# Patient Record
Sex: FEMALE | Race: OTHER | HISPANIC OR LATINO
[De-identification: names, ages, dates, MRNs, and addresses within clinical notes are randomized per-mention and may not be internally consistent; named-entity substitution may affect disease eponyms.]

---

## 2018-11-15 ENCOUNTER — TRANSCRIPTION ENCOUNTER (OUTPATIENT)
Age: 83
End: 2018-11-15

## 2018-11-16 ENCOUNTER — INPATIENT (INPATIENT)
Facility: HOSPITAL | Age: 83
LOS: 4 days | Discharge: HOME | DRG: 335 | End: 2018-11-21
Attending: SURGERY | Admitting: SURGERY
Payer: COMMERCIAL

## 2018-11-16 VITALS
WEIGHT: 115.08 LBS | HEART RATE: 64 BPM | TEMPERATURE: 99 F | DIASTOLIC BLOOD PRESSURE: 103 MMHG | SYSTOLIC BLOOD PRESSURE: 187 MMHG | HEIGHT: 59 IN | OXYGEN SATURATION: 95 % | RESPIRATION RATE: 16 BRPM

## 2018-11-16 DIAGNOSIS — I16.0 HYPERTENSIVE URGENCY: ICD-10-CM

## 2018-11-16 DIAGNOSIS — Z29.9 ENCOUNTER FOR PROPHYLACTIC MEASURES, UNSPECIFIED: ICD-10-CM

## 2018-11-16 DIAGNOSIS — K56.609 UNSPECIFIED INTESTINAL OBSTRUCTION, UNSPECIFIED AS TO PARTIAL VERSUS COMPLETE OBSTRUCTION: ICD-10-CM

## 2018-11-16 DIAGNOSIS — R74.8 ABNORMAL LEVELS OF OTHER SERUM ENZYMES: ICD-10-CM

## 2018-11-16 DIAGNOSIS — M19.90 UNSPECIFIED OSTEOARTHRITIS, UNSPECIFIED SITE: ICD-10-CM

## 2018-11-16 DIAGNOSIS — I48.91 UNSPECIFIED ATRIAL FIBRILLATION: ICD-10-CM

## 2018-11-16 LAB
ALBUMIN SERPL ELPH-MCNC: 3.5 G/DL — SIGNIFICANT CHANGE UP (ref 3.5–5)
ALP SERPL-CCNC: 136 U/L — HIGH (ref 40–120)
ALT FLD-CCNC: 29 U/L DA — SIGNIFICANT CHANGE UP (ref 10–60)
ANION GAP SERPL CALC-SCNC: 10 MMOL/L — SIGNIFICANT CHANGE UP (ref 5–17)
ANION GAP SERPL CALC-SCNC: 11 MMOL/L — SIGNIFICANT CHANGE UP (ref 5–17)
ANION GAP SERPL CALC-SCNC: 11 MMOL/L — SIGNIFICANT CHANGE UP (ref 5–17)
APPEARANCE UR: CLEAR — SIGNIFICANT CHANGE UP
APTT BLD: 26.4 SEC — LOW (ref 27.5–36.3)
AST SERPL-CCNC: 55 U/L — HIGH (ref 10–40)
BASE EXCESS BLDA CALC-SCNC: -0.5 MMOL/L — SIGNIFICANT CHANGE UP (ref -2–2)
BASOPHILS # BLD AUTO: 0 K/UL — SIGNIFICANT CHANGE UP (ref 0–0.2)
BASOPHILS # BLD AUTO: 0.1 K/UL — SIGNIFICANT CHANGE UP (ref 0–0.2)
BASOPHILS NFR BLD AUTO: 0.6 % — SIGNIFICANT CHANGE UP (ref 0–2)
BASOPHILS NFR BLD AUTO: 1.2 % — SIGNIFICANT CHANGE UP (ref 0–2)
BILIRUB SERPL-MCNC: 1.3 MG/DL — HIGH (ref 0.2–1.2)
BILIRUB UR-MCNC: NEGATIVE — SIGNIFICANT CHANGE UP
BLOOD GAS COMMENTS ARTERIAL: SIGNIFICANT CHANGE UP
BUN SERPL-MCNC: 18 MG/DL — SIGNIFICANT CHANGE UP (ref 7–18)
BUN SERPL-MCNC: 19 MG/DL — HIGH (ref 7–18)
BUN SERPL-MCNC: 22 MG/DL — HIGH (ref 7–18)
CALCIUM SERPL-MCNC: 7.3 MG/DL — LOW (ref 8.4–10.5)
CALCIUM SERPL-MCNC: 8.8 MG/DL — SIGNIFICANT CHANGE UP (ref 8.4–10.5)
CALCIUM SERPL-MCNC: 8.9 MG/DL — SIGNIFICANT CHANGE UP (ref 8.4–10.5)
CHLORIDE SERPL-SCNC: 101 MMOL/L — SIGNIFICANT CHANGE UP (ref 96–108)
CHLORIDE SERPL-SCNC: 103 MMOL/L — SIGNIFICANT CHANGE UP (ref 96–108)
CHLORIDE SERPL-SCNC: 109 MMOL/L — HIGH (ref 96–108)
CK MB BLD-MCNC: 6.6 % — HIGH (ref 0–3.5)
CK MB CFR SERPL CALC: 6.7 NG/ML — HIGH (ref 0–3.6)
CK SERPL-CCNC: 102 U/L — SIGNIFICANT CHANGE UP (ref 21–215)
CO2 SERPL-SCNC: 23 MMOL/L — SIGNIFICANT CHANGE UP (ref 22–31)
CO2 SERPL-SCNC: 24 MMOL/L — SIGNIFICANT CHANGE UP (ref 22–31)
CO2 SERPL-SCNC: 25 MMOL/L — SIGNIFICANT CHANGE UP (ref 22–31)
COLOR SPEC: YELLOW — SIGNIFICANT CHANGE UP
CREAT SERPL-MCNC: 0.74 MG/DL — SIGNIFICANT CHANGE UP (ref 0.5–1.3)
CREAT SERPL-MCNC: 0.79 MG/DL — SIGNIFICANT CHANGE UP (ref 0.5–1.3)
CREAT SERPL-MCNC: 1.13 MG/DL — SIGNIFICANT CHANGE UP (ref 0.5–1.3)
DIFF PNL FLD: ABNORMAL
EOSINOPHIL # BLD AUTO: 0 K/UL — SIGNIFICANT CHANGE UP (ref 0–0.5)
EOSINOPHIL # BLD AUTO: 0 K/UL — SIGNIFICANT CHANGE UP (ref 0–0.5)
EOSINOPHIL NFR BLD AUTO: 0.1 % — SIGNIFICANT CHANGE UP (ref 0–6)
EOSINOPHIL NFR BLD AUTO: 0.2 % — SIGNIFICANT CHANGE UP (ref 0–6)
GLUCOSE SERPL-MCNC: 110 MG/DL — HIGH (ref 70–99)
GLUCOSE SERPL-MCNC: 147 MG/DL — HIGH (ref 70–99)
GLUCOSE SERPL-MCNC: 150 MG/DL — HIGH (ref 70–99)
GLUCOSE UR QL: NEGATIVE — SIGNIFICANT CHANGE UP
HCO3 BLDA-SCNC: 22 MMOL/L — LOW (ref 23–27)
HCT VFR BLD CALC: 36.5 % — SIGNIFICANT CHANGE UP (ref 34.5–45)
HCT VFR BLD CALC: 41.6 % — SIGNIFICANT CHANGE UP (ref 34.5–45)
HCT VFR BLD CALC: 44 % — SIGNIFICANT CHANGE UP (ref 34.5–45)
HGB BLD-MCNC: 11.8 G/DL — SIGNIFICANT CHANGE UP (ref 11.5–15.5)
HGB BLD-MCNC: 13.4 G/DL — SIGNIFICANT CHANGE UP (ref 11.5–15.5)
HGB BLD-MCNC: 14.3 G/DL — SIGNIFICANT CHANGE UP (ref 11.5–15.5)
HOROWITZ INDEX BLDA+IHG-RTO: 50 — SIGNIFICANT CHANGE UP
KETONES UR-MCNC: ABNORMAL
LACTATE SERPL-SCNC: 1.8 MMOL/L — SIGNIFICANT CHANGE UP (ref 0.7–2)
LACTATE SERPL-SCNC: 2.1 MMOL/L — HIGH (ref 0.7–2)
LEUKOCYTE ESTERASE UR-ACNC: ABNORMAL
LIDOCAIN IGE QN: 118 U/L — SIGNIFICANT CHANGE UP (ref 73–393)
LYMPHOCYTES # BLD AUTO: 0.3 K/UL — LOW (ref 1–3.3)
LYMPHOCYTES # BLD AUTO: 0.5 K/UL — LOW (ref 1–3.3)
LYMPHOCYTES # BLD AUTO: 5 % — LOW (ref 13–44)
LYMPHOCYTES # BLD AUTO: 6.4 % — LOW (ref 13–44)
MAGNESIUM SERPL-MCNC: 1.5 MG/DL — LOW (ref 1.6–2.6)
MCHC RBC-ENTMCNC: 30.8 PG — SIGNIFICANT CHANGE UP (ref 27–34)
MCHC RBC-ENTMCNC: 31 PG — SIGNIFICANT CHANGE UP (ref 27–34)
MCHC RBC-ENTMCNC: 31.2 PG — SIGNIFICANT CHANGE UP (ref 27–34)
MCHC RBC-ENTMCNC: 32.2 GM/DL — SIGNIFICANT CHANGE UP (ref 32–36)
MCHC RBC-ENTMCNC: 32.5 GM/DL — SIGNIFICANT CHANGE UP (ref 32–36)
MCHC RBC-ENTMCNC: 32.5 GM/DL — SIGNIFICANT CHANGE UP (ref 32–36)
MCV RBC AUTO: 94.9 FL — SIGNIFICANT CHANGE UP (ref 80–100)
MCV RBC AUTO: 96 FL — SIGNIFICANT CHANGE UP (ref 80–100)
MCV RBC AUTO: 96.2 FL — SIGNIFICANT CHANGE UP (ref 80–100)
MONOCYTES # BLD AUTO: 0.4 K/UL — SIGNIFICANT CHANGE UP (ref 0–0.9)
MONOCYTES # BLD AUTO: 0.5 K/UL — SIGNIFICANT CHANGE UP (ref 0–0.9)
MONOCYTES NFR BLD AUTO: 5.4 % — SIGNIFICANT CHANGE UP (ref 2–14)
MONOCYTES NFR BLD AUTO: 9.5 % — SIGNIFICANT CHANGE UP (ref 2–14)
NEUTROPHILS # BLD AUTO: 4.4 K/UL — SIGNIFICANT CHANGE UP (ref 1.8–7.4)
NEUTROPHILS # BLD AUTO: 7 K/UL — SIGNIFICANT CHANGE UP (ref 1.8–7.4)
NEUTROPHILS NFR BLD AUTO: 84.7 % — HIGH (ref 43–77)
NEUTROPHILS NFR BLD AUTO: 86.9 % — HIGH (ref 43–77)
NITRITE UR-MCNC: NEGATIVE — SIGNIFICANT CHANGE UP
NT-PROBNP SERPL-SCNC: HIGH PG/ML (ref 0–450)
PCO2 BLDA: 29 MMHG — LOW (ref 32–46)
PH BLDA: 7.49 — HIGH (ref 7.35–7.45)
PH UR: 6.5 — SIGNIFICANT CHANGE UP (ref 5–8)
PHOSPHATE SERPL-MCNC: 2.8 MG/DL — SIGNIFICANT CHANGE UP (ref 2.5–4.5)
PLATELET # BLD AUTO: 174 K/UL — SIGNIFICANT CHANGE UP (ref 150–400)
PLATELET # BLD AUTO: 239 K/UL — SIGNIFICANT CHANGE UP (ref 150–400)
PLATELET # BLD AUTO: 240 K/UL — SIGNIFICANT CHANGE UP (ref 150–400)
PO2 BLDA: 123 MMHG — HIGH (ref 74–108)
POTASSIUM SERPL-MCNC: 3.3 MMOL/L — LOW (ref 3.5–5.3)
POTASSIUM SERPL-MCNC: 3.3 MMOL/L — LOW (ref 3.5–5.3)
POTASSIUM SERPL-MCNC: 3.8 MMOL/L — SIGNIFICANT CHANGE UP (ref 3.5–5.3)
POTASSIUM SERPL-SCNC: 3.3 MMOL/L — LOW (ref 3.5–5.3)
POTASSIUM SERPL-SCNC: 3.3 MMOL/L — LOW (ref 3.5–5.3)
POTASSIUM SERPL-SCNC: 3.8 MMOL/L — SIGNIFICANT CHANGE UP (ref 3.5–5.3)
PROT SERPL-MCNC: 8.1 G/DL — SIGNIFICANT CHANGE UP (ref 6–8.3)
PROT UR-MCNC: 100
RBC # BLD: 3.84 M/UL — SIGNIFICANT CHANGE UP (ref 3.8–5.2)
RBC # BLD: 4.32 M/UL — SIGNIFICANT CHANGE UP (ref 3.8–5.2)
RBC # BLD: 4.58 M/UL — SIGNIFICANT CHANGE UP (ref 3.8–5.2)
RBC # FLD: 13.6 % — SIGNIFICANT CHANGE UP (ref 10.3–14.5)
RBC # FLD: 13.7 % — SIGNIFICANT CHANGE UP (ref 10.3–14.5)
RBC # FLD: 13.8 % — SIGNIFICANT CHANGE UP (ref 10.3–14.5)
SAO2 % BLDA: 99 % — HIGH (ref 92–96)
SODIUM SERPL-SCNC: 137 MMOL/L — SIGNIFICANT CHANGE UP (ref 135–145)
SODIUM SERPL-SCNC: 137 MMOL/L — SIGNIFICANT CHANGE UP (ref 135–145)
SODIUM SERPL-SCNC: 143 MMOL/L — SIGNIFICANT CHANGE UP (ref 135–145)
SP GR SPEC: 1.01 — SIGNIFICANT CHANGE UP (ref 1.01–1.02)
TROPONIN I SERPL-MCNC: 0.06 NG/ML — HIGH (ref 0–0.04)
TROPONIN I SERPL-MCNC: 0.14 NG/ML — HIGH (ref 0–0.04)
UROBILINOGEN FLD QL: NEGATIVE — SIGNIFICANT CHANGE UP
WBC # BLD: 13 K/UL — HIGH (ref 3.8–10.5)
WBC # BLD: 5.2 K/UL — SIGNIFICANT CHANGE UP (ref 3.8–10.5)
WBC # BLD: 8 K/UL — SIGNIFICANT CHANGE UP (ref 3.8–10.5)
WBC # FLD AUTO: 13 K/UL — HIGH (ref 3.8–10.5)
WBC # FLD AUTO: 5.2 K/UL — SIGNIFICANT CHANGE UP (ref 3.8–10.5)
WBC # FLD AUTO: 8 K/UL — SIGNIFICANT CHANGE UP (ref 3.8–10.5)

## 2018-11-16 PROCEDURE — 74177 CT ABD & PELVIS W/CONTRAST: CPT | Mod: 26

## 2018-11-16 PROCEDURE — 71045 X-RAY EXAM CHEST 1 VIEW: CPT | Mod: 26,76

## 2018-11-16 PROCEDURE — 71045 X-RAY EXAM CHEST 1 VIEW: CPT | Mod: 26,77

## 2018-11-16 PROCEDURE — 99285 EMERGENCY DEPT VISIT HI MDM: CPT

## 2018-11-16 PROCEDURE — 93970 EXTREMITY STUDY: CPT | Mod: 26

## 2018-11-16 RX ORDER — SODIUM CHLORIDE 9 MG/ML
1000 INJECTION, SOLUTION INTRAVENOUS ONCE
Qty: 0 | Refills: 0 | Status: COMPLETED | OUTPATIENT
Start: 2018-11-16 | End: 2018-11-16

## 2018-11-16 RX ORDER — SODIUM CHLORIDE 9 MG/ML
1000 INJECTION INTRAMUSCULAR; INTRAVENOUS; SUBCUTANEOUS ONCE
Qty: 0 | Refills: 0 | Status: COMPLETED | OUTPATIENT
Start: 2018-11-16 | End: 2018-11-16

## 2018-11-16 RX ORDER — METOPROLOL TARTRATE 50 MG
5 TABLET ORAL EVERY 6 HOURS
Qty: 0 | Refills: 0 | Status: DISCONTINUED | OUTPATIENT
Start: 2018-11-16 | End: 2018-11-16

## 2018-11-16 RX ORDER — MORPHINE SULFATE 50 MG/1
2 CAPSULE, EXTENDED RELEASE ORAL ONCE
Qty: 0 | Refills: 0 | Status: DISCONTINUED | OUTPATIENT
Start: 2018-11-16 | End: 2018-11-16

## 2018-11-16 RX ORDER — ONDANSETRON 8 MG/1
4 TABLET, FILM COATED ORAL ONCE
Qty: 0 | Refills: 0 | Status: COMPLETED | OUTPATIENT
Start: 2018-11-16 | End: 2018-11-16

## 2018-11-16 RX ORDER — MORPHINE SULFATE 50 MG/1
4 CAPSULE, EXTENDED RELEASE ORAL ONCE
Qty: 0 | Refills: 0 | Status: DISCONTINUED | OUTPATIENT
Start: 2018-11-16 | End: 2018-11-16

## 2018-11-16 RX ORDER — ONDANSETRON 8 MG/1
4 TABLET, FILM COATED ORAL EVERY 6 HOURS
Qty: 0 | Refills: 0 | Status: DISCONTINUED | OUTPATIENT
Start: 2018-11-16 | End: 2018-11-16

## 2018-11-16 RX ORDER — PIPERACILLIN AND TAZOBACTAM 4; .5 G/20ML; G/20ML
3.38 INJECTION, POWDER, LYOPHILIZED, FOR SOLUTION INTRAVENOUS EVERY 8 HOURS
Qty: 0 | Refills: 0 | Status: DISCONTINUED | OUTPATIENT
Start: 2018-11-16 | End: 2018-11-16

## 2018-11-16 RX ORDER — MORPHINE SULFATE 50 MG/1
2 CAPSULE, EXTENDED RELEASE ORAL EVERY 4 HOURS
Qty: 0 | Refills: 0 | Status: DISCONTINUED | OUTPATIENT
Start: 2018-11-16 | End: 2018-11-16

## 2018-11-16 RX ORDER — HEPARIN SODIUM 5000 [USP'U]/ML
5000 INJECTION INTRAVENOUS; SUBCUTANEOUS EVERY 8 HOURS
Qty: 0 | Refills: 0 | Status: DISCONTINUED | OUTPATIENT
Start: 2018-11-16 | End: 2018-11-20

## 2018-11-16 RX ORDER — PIPERACILLIN AND TAZOBACTAM 4; .5 G/20ML; G/20ML
3.38 INJECTION, POWDER, LYOPHILIZED, FOR SOLUTION INTRAVENOUS ONCE
Qty: 0 | Refills: 0 | Status: COMPLETED | OUTPATIENT
Start: 2018-11-16 | End: 2018-11-16

## 2018-11-16 RX ORDER — METOPROLOL TARTRATE 50 MG
2.5 TABLET ORAL EVERY 6 HOURS
Qty: 0 | Refills: 0 | Status: DISCONTINUED | OUTPATIENT
Start: 2018-11-16 | End: 2018-11-16

## 2018-11-16 RX ORDER — NITROGLYCERIN 6.5 MG
0.4 CAPSULE, EXTENDED RELEASE ORAL ONCE
Qty: 0 | Refills: 0 | Status: COMPLETED | OUTPATIENT
Start: 2018-11-16 | End: 2018-11-16

## 2018-11-16 RX ORDER — MAGNESIUM SULFATE 500 MG/ML
2 VIAL (ML) INJECTION ONCE
Qty: 0 | Refills: 0 | Status: COMPLETED | OUTPATIENT
Start: 2018-11-16 | End: 2018-11-16

## 2018-11-16 RX ORDER — POTASSIUM CHLORIDE 20 MEQ
10 PACKET (EA) ORAL
Qty: 0 | Refills: 0 | Status: COMPLETED | OUTPATIENT
Start: 2018-11-16 | End: 2018-11-16

## 2018-11-16 RX ORDER — CHLORHEXIDINE GLUCONATE 213 G/1000ML
1 SOLUTION TOPICAL
Qty: 0 | Refills: 0 | Status: DISCONTINUED | OUTPATIENT
Start: 2018-11-16 | End: 2018-11-21

## 2018-11-16 RX ORDER — FAMOTIDINE 10 MG/ML
20 INJECTION INTRAVENOUS ONCE
Qty: 0 | Refills: 0 | Status: COMPLETED | OUTPATIENT
Start: 2018-11-16 | End: 2018-11-16

## 2018-11-16 RX ORDER — FENTANYL CITRATE 50 UG/ML
0.5 INJECTION INTRAVENOUS
Qty: 2500 | Refills: 0 | Status: DISCONTINUED | OUTPATIENT
Start: 2018-11-16 | End: 2018-11-17

## 2018-11-16 RX ORDER — LABETALOL HCL 100 MG
10 TABLET ORAL EVERY 6 HOURS
Qty: 0 | Refills: 0 | Status: DISCONTINUED | OUTPATIENT
Start: 2018-11-16 | End: 2018-11-16

## 2018-11-16 RX ORDER — DEXTROSE MONOHYDRATE, SODIUM CHLORIDE, AND POTASSIUM CHLORIDE 50; .745; 4.5 G/1000ML; G/1000ML; G/1000ML
1000 INJECTION, SOLUTION INTRAVENOUS
Qty: 0 | Refills: 0 | Status: DISCONTINUED | OUTPATIENT
Start: 2018-11-16 | End: 2018-11-16

## 2018-11-16 RX ORDER — HYDRALAZINE HCL 50 MG
5 TABLET ORAL EVERY 6 HOURS
Qty: 0 | Refills: 0 | Status: DISCONTINUED | OUTPATIENT
Start: 2018-11-16 | End: 2018-11-16

## 2018-11-16 RX ORDER — PIPERACILLIN AND TAZOBACTAM 4; .5 G/20ML; G/20ML
3.38 INJECTION, POWDER, LYOPHILIZED, FOR SOLUTION INTRAVENOUS EVERY 8 HOURS
Qty: 0 | Refills: 0 | Status: DISCONTINUED | OUTPATIENT
Start: 2018-11-16 | End: 2018-11-21

## 2018-11-16 RX ORDER — ACETAMINOPHEN 500 MG
1000 TABLET ORAL ONCE
Qty: 0 | Refills: 0 | Status: COMPLETED | OUTPATIENT
Start: 2018-11-16 | End: 2018-11-16

## 2018-11-16 RX ADMIN — FAMOTIDINE 20 MILLIGRAM(S): 10 INJECTION INTRAVENOUS at 02:27

## 2018-11-16 RX ADMIN — Medication 100 MILLIEQUIVALENT(S): at 20:09

## 2018-11-16 RX ADMIN — Medication 400 MILLIGRAM(S): at 12:08

## 2018-11-16 RX ADMIN — MORPHINE SULFATE 4 MILLIGRAM(S): 50 CAPSULE, EXTENDED RELEASE ORAL at 03:55

## 2018-11-16 RX ADMIN — Medication 0.4 MILLIGRAM(S): at 03:43

## 2018-11-16 RX ADMIN — Medication 5 MILLIGRAM(S): at 11:38

## 2018-11-16 RX ADMIN — Medication 50 GRAM(S): at 20:31

## 2018-11-16 RX ADMIN — PIPERACILLIN AND TAZOBACTAM 25 GRAM(S): 4; .5 INJECTION, POWDER, LYOPHILIZED, FOR SOLUTION INTRAVENOUS at 22:24

## 2018-11-16 RX ADMIN — HEPARIN SODIUM 5000 UNIT(S): 5000 INJECTION INTRAVENOUS; SUBCUTANEOUS at 22:24

## 2018-11-16 RX ADMIN — SODIUM CHLORIDE 4000 MILLILITER(S): 9 INJECTION, SOLUTION INTRAVENOUS at 18:50

## 2018-11-16 RX ADMIN — MORPHINE SULFATE 4 MILLIGRAM(S): 50 CAPSULE, EXTENDED RELEASE ORAL at 03:43

## 2018-11-16 RX ADMIN — PIPERACILLIN AND TAZOBACTAM 25 GRAM(S): 4; .5 INJECTION, POWDER, LYOPHILIZED, FOR SOLUTION INTRAVENOUS at 14:34

## 2018-11-16 RX ADMIN — Medication 100 MILLIEQUIVALENT(S): at 21:29

## 2018-11-16 RX ADMIN — SODIUM CHLORIDE 1000 MILLILITER(S): 9 INJECTION INTRAMUSCULAR; INTRAVENOUS; SUBCUTANEOUS at 06:26

## 2018-11-16 RX ADMIN — Medication 100 MILLIEQUIVALENT(S): at 22:24

## 2018-11-16 RX ADMIN — Medication 1000 MILLIGRAM(S): at 12:50

## 2018-11-16 RX ADMIN — ONDANSETRON 4 MILLIGRAM(S): 8 TABLET, FILM COATED ORAL at 12:32

## 2018-11-16 RX ADMIN — SODIUM CHLORIDE 1000 MILLILITER(S): 9 INJECTION INTRAMUSCULAR; INTRAVENOUS; SUBCUTANEOUS at 02:29

## 2018-11-16 RX ADMIN — MORPHINE SULFATE 4 MILLIGRAM(S): 50 CAPSULE, EXTENDED RELEASE ORAL at 02:27

## 2018-11-16 RX ADMIN — FENTANYL CITRATE 2.92 MICROGRAM(S)/KG/HR: 50 INJECTION INTRAVENOUS at 21:30

## 2018-11-16 RX ADMIN — PIPERACILLIN AND TAZOBACTAM 200 GRAM(S): 4; .5 INJECTION, POWDER, LYOPHILIZED, FOR SOLUTION INTRAVENOUS at 06:33

## 2018-11-16 RX ADMIN — ONDANSETRON 4 MILLIGRAM(S): 8 TABLET, FILM COATED ORAL at 02:34

## 2018-11-16 NOTE — CONSULT NOTE ADULT - PROBLEM SELECTOR RECOMMENDATION 2
patient presents with persistently elevated bp and no ability to give PO meds  -questionable whether it is HTN emergency given that patient is making troponins  -will put patient on IV hydralazine 5mg q6h and monitor bp  -hold for SBP < 140 Initial issue on presentation to the ED but likely 2/2 to poor pain control  - Begun on PRN Metoprolol and Hydralazine  - S/p OR hypotensive MAPs 50s, given 1 L LR bolus

## 2018-11-16 NOTE — CONSULT NOTE ADULT - PROBLEM SELECTOR RECOMMENDATION 2
patient presents with persistently elevated bp and no ability to give PO meds  -questionable whether it is HTN emergency given that patient is making troponins  -will put patient on IV hydralazine 5mg q6h and monitor bp  -hold for SBP < 140

## 2018-11-16 NOTE — CONSULT NOTE ADULT - SUBJECTIVE AND OBJECTIVE BOX
89y  Female  No Known Allergies    Patient is a 89y old  Female who presents with a chief complaint of sbo (16 Nov 2018 14:17)    HPI:  89 y/o female with h/o c-sections, open solange, poss hysterectomy(son unsure)  PMH-HTN  c/o abd pain, n/v since last night with PO intake  last bm/flatus Wed  no f/c    < from: CT Abdomen and Pelvis w/ IV Cont (11.16.18 @ 04:07) >  There is a small hiatus hernia. There are numerous dilated fluid-filled   small bowel loops predominantly in the pelvis with multiple converging   transition points in the right lower quadrant most compatible with a   closed loop small bowel obstruction. There is resultant upstream   dilatation of proximal small bowel loops. There is mild thickening of a   segment of the dilated small bowel in the right lower quadrant with   mesenteric edema and small volume ascites which raises concern for bowel   ischemia. A normal appendix is identified. There is colonic   diverticulosis without evidence of diverticulitis. There is no   pneumoperitoneum. 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    < end of copied text > (16 Nov 2018 06:14)    PAST MEDICAL & SURGICAL HISTORY:  Arthritis  HTN (hypertension)    FAMILY HISTORY:    MEDICATIONS  (STANDING):  dextrose 5% + sodium chloride 0.45% with potassium chloride 20 mEq/L 1000 milliLiter(s) (100 mL/Hr) IV Continuous <Continuous>  heparin  Injectable 5000 Unit(s) SubCutaneous every 8 hours  hydrALAZINE Injectable 5 milliGRAM(s) IV Push every 6 hours  lactated ringers Bolus 1000 milliLiter(s) IV Bolus once  piperacillin/tazobactam IVPB. 3.375 Gram(s) IV Intermittent every 8 hours    MEDICATIONS  (PRN):  metoprolol tartrate Injectable 5 milliGRAM(s) IV Push every 6 hours PRN for HR > 110  morphine  - Injectable 2 milliGRAM(s) IV Push once PRN Severe Pain (7 - 10)  ondansetron Injectable 4 milliGRAM(s) IV Push every 6 hours PRN Nausea and/or Vomiting      Vital Signs Last 24 Hrs  T(C): 35.3 (16 Nov 2018 18:38), Max: 37.3 (16 Nov 2018 01:48)  T(F): 95.5 (16 Nov 2018 18:38), Max: 99.1 (16 Nov 2018 01:48)  HR: 56 (16 Nov 2018 18:45) (56 - 75)  BP: 125/61 (16 Nov 2018 18:45) (82/37 - 188/91)  BP(mean): 76 (16 Nov 2018 18:45) (46 - 76)  RR: 22 (16 Nov 2018 18:45) (15 - 22)  SpO2: 98% (16 Nov 2018 18:45) (95% - 100%)  CBC Full  -  ( 16 Nov 2018 12:10 )  WBC Count : 13.0 K/uL  Hemoglobin : 14.3 g/dL  Hematocrit : 44.0 %  Platelet Count - Automated : 239 K/uL  Mean Cell Volume : 96.0 fl  Mean Cell Hemoglobin : 31.2 pg  Mean Cell Hemoglobin Concentration : 32.5 gm/dL  Auto Neutrophil # : x  Auto Lymphocyte # : x  Auto Monocyte # : x  Auto Eosinophil # : x  Auto Basophil # : x  Auto Neutrophil % : x  Auto Lymphocyte % : x  Auto Monocyte % : x  Auto Eosinophil % : x  Auto Basophil % : x    PTT - ( 16 Nov 2018 02:56 )  PTT:26.4 sec  11-16    137  |  103  |  18  ----------------------------<  150<H>  3.3<L>   |  24  |  0.79    Ca    8.8      16 Nov 2018 12:10    TPro  8.1  /  Alb  3.5  /  TBili  1.3<H>  /  DBili  x   /  AST  55<H>  /  ALT  29  /  AlkPhos  136<H>  11-16      REVIEW OF SYSTEMS      General:	    Skin/Breast:  	  Ophthalmologic:  	  ENMT:	    Respiratory and Thorax:  	  Cardiovascular:	    Gastrointestinal:	    Genitourinary:	    Musculoskeletal:	    Neurological:	    Psychiatric:	    Hematology/Lymphatics:	    Endocrine:	    Allergic/Immunologic:	    Physicial Exam:     Constitutional: NAD, well-groomed, well-developed  HEENT: PERRLA, EOMI, no drainage or redness  Neck: No bruits; no thyromegaly or nodules,  No JVD  Back: Normal spine flexure, No CVA tenderness, No deformity or limitation of movement  Respiratory: Breath Sounds equal & clear to percussion & auscultation, no accessory muscle use  Cardiovascular: Regular rate & rhythm, normal S1, S2; no murmurs, gallops or rubs; no S3, S4  Gastrointestinal: Soft, non-tender, non distended no hepatosplenomegaly, normal bowel sounds  Extremities: No peripheral edema, No cyanosis, clubbing   Vascular: Equal and normal pulses: 2+ peripheral pulses throughout  Neurological: GCS:    A&O x 3; no sensory, motor  deficits, normal reflexes  Psychiatric: Normal mood, normal affect  Musculoskeletal: No joint pain, swelling or deformity; no limitation of movement  Skin: No rashes HPI:  89 F pt with PMHx HTN presents to ED c/o constant burning abdominal pain - unable to hold down prunes or soup that her son tried to give. Son gave pt 2 Advil tablets and anti-acids with no relief. Pt was c/o difficulty breathing earlier in the day but symptoms have resolved since then. Patient denies diarrhea, fever, chest pain or any other acute complaints at this time.      89 y/o female with h/o c-sections, open solange, poss hysterectomy(son unsure)  PMH-HTN  c/o abd pain, n/v since last night with PO intake  last bm/flatus Wed  no f/c    < from: CT Abdomen and Pelvis w/ IV Cont (11.16.18 @ 04:07) >  There is a small hiatus hernia. There are numerous dilated fluid-filled   small bowel loops predominantly in the pelvis with multiple converging   transition points in the right lower quadrant most compatible with a   closed loop small bowel obstruction. There is resultant upstream   dilatation of proximal small bowel loops. There is mild thickening of a   segment of the dilated small bowel in the right lower quadrant with   mesenteric edema and small volume ascites which raises concern for bowel   ischemia. A normal appendix is identified. There is colonic   diverticulosis without evidence of diverticulitis. There is no   pneumoperitoneum. 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    < end of copied text > (16 Nov 2018 06:14)    PAST MEDICAL & SURGICAL HISTORY:  Arthritis  HTN (hypertension)    FAMILY HISTORY:    MEDICATIONS  (STANDING):  dextrose 5% + sodium chloride 0.45% with potassium chloride 20 mEq/L 1000 milliLiter(s) (100 mL/Hr) IV Continuous <Continuous>  heparin  Injectable 5000 Unit(s) SubCutaneous every 8 hours  hydrALAZINE Injectable 5 milliGRAM(s) IV Push every 6 hours  lactated ringers Bolus 1000 milliLiter(s) IV Bolus once  piperacillin/tazobactam IVPB. 3.375 Gram(s) IV Intermittent every 8 hours    MEDICATIONS  (PRN):  metoprolol tartrate Injectable 5 milliGRAM(s) IV Push every 6 hours PRN for HR > 110  morphine  - Injectable 2 milliGRAM(s) IV Push once PRN Severe Pain (7 - 10)  ondansetron Injectable 4 milliGRAM(s) IV Push every 6 hours PRN Nausea and/or Vomiting      Vital Signs Last 24 Hrs  T(C): 35.3 (16 Nov 2018 18:38), Max: 37.3 (16 Nov 2018 01:48)  T(F): 95.5 (16 Nov 2018 18:38), Max: 99.1 (16 Nov 2018 01:48)  HR: 56 (16 Nov 2018 18:45) (56 - 75)  BP: 125/61 (16 Nov 2018 18:45) (82/37 - 188/91)  BP(mean): 76 (16 Nov 2018 18:45) (46 - 76)  RR: 22 (16 Nov 2018 18:45) (15 - 22)  SpO2: 98% (16 Nov 2018 18:45) (95% - 100%)  CBC Full  -  ( 16 Nov 2018 12:10 )  WBC Count : 13.0 K/uL  Hemoglobin : 14.3 g/dL  Hematocrit : 44.0 %  Platelet Count - Automated : 239 K/uL  Mean Cell Volume : 96.0 fl  Mean Cell Hemoglobin : 31.2 pg  Mean Cell Hemoglobin Concentration : 32.5 gm/dL  Auto Neutrophil # : x  Auto Lymphocyte # : x  Auto Monocyte # : x  Auto Eosinophil # : x  Auto Basophil # : x  Auto Neutrophil % : x  Auto Lymphocyte % : x  Auto Monocyte % : x  Auto Eosinophil % : x  Auto Basophil % : x    PTT - ( 16 Nov 2018 02:56 )  PTT:26.4 sec  11-16    137  |  103  |  18  ----------------------------<  150<H>  3.3<L>   |  24  |  0.79    Ca    8.8      16 Nov 2018 12:10    TPro  8.1  /  Alb  3.5  /  TBili  1.3<H>  /  DBili  x   /  AST  55<H>  /  ALT  29  /  AlkPhos  136<H>  11-16      REVIEW OF SYSTEMS      General:	    Skin/Breast:  	  Ophthalmologic:  	  ENMT:	    Respiratory and Thorax:  	  Cardiovascular:	    Gastrointestinal:	    Genitourinary:	    Musculoskeletal:	    Neurological:	    Psychiatric:	    Hematology/Lymphatics:	    Endocrine:	    Allergic/Immunologic:	    Physicial Exam:     Constitutional: NAD, well-groomed, well-developed  HEENT: PERRLA, EOMI, no drainage or redness  Neck: No bruits; no thyromegaly or nodules,  No JVD  Back: Normal spine flexure, No CVA tenderness, No deformity or limitation of movement  Respiratory: Breath Sounds equal & clear to percussion & auscultation, no accessory muscle use  Cardiovascular: Regular rate & rhythm, normal S1, S2; no murmurs, gallops or rubs; no S3, S4  Gastrointestinal: Soft, non-tender, non distended no hepatosplenomegaly, normal bowel sounds  Extremities: No peripheral edema, No cyanosis, clubbing   Vascular: Equal and normal pulses: 2+ peripheral pulses throughout  Neurological: GCS:    A&O x 3; no sensory, motor  deficits, normal reflexes  Psychiatric: Normal mood, normal affect  Musculoskeletal: No joint pain, swelling or deformity; no limitation of movement  Skin: No rashes HPI:  89 F PMHx HTN and a significant PSH , Open cholecystectomy, and Hysterectomy presents to ED c/o constant burning abdominal pain x 2 days, prior BM/flatus Wednesday - found to have SBO in ED and new onset rate controlled Afib in the ED - admitted to surgery service for management - underwent  exploratory laparotomy w/ MARIA ISABEL; no bowel resection.    < from: CT Abdomen and Pelvis w/ IV Cont (18 @ 04:07) >  There is a small hiatus hernia. There are numerous dilated fluid-filled   small bowel loops predominantly in the pelvis with multiple converging   transition points in the right lower quadrant most compatible with a   closed loop small bowel obstruction. There is resultant upstream   dilatation of proximal small bowel loops. There is mild thickening of a   segment of the dilated small bowel in the right lower quadrant with   mesenteric edema and small volume ascites which raises concern for bowel   ischemia. A normal appendix is identified. There is colonic   diverticulosis without evidence of diverticulitis. There is no   pneumoperitoneum. 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    < end of copied text > (2018 06:14)    PAST MEDICAL & SURGICAL HISTORY:  Arthritis  HTN (hypertension)    FAMILY HISTORY:    MEDICATIONS  (STANDING):  dextrose 5% + sodium chloride 0.45% with potassium chloride 20 mEq/L 1000 milliLiter(s) (100 mL/Hr) IV Continuous <Continuous>  heparin  Injectable 5000 Unit(s) SubCutaneous every 8 hours  hydrALAZINE Injectable 5 milliGRAM(s) IV Push every 6 hours  lactated ringers Bolus 1000 milliLiter(s) IV Bolus once  piperacillin/tazobactam IVPB. 3.375 Gram(s) IV Intermittent every 8 hours    MEDICATIONS  (PRN):  metoprolol tartrate Injectable 5 milliGRAM(s) IV Push every 6 hours PRN for HR > 110  morphine  - Injectable 2 milliGRAM(s) IV Push once PRN Severe Pain (7 - 10)  ondansetron Injectable 4 milliGRAM(s) IV Push every 6 hours PRN Nausea and/or Vomiting      Vital Signs Last 24 Hrs  T(C): 35.3 (2018 18:38), Max: 37.3 (2018 01:48)  T(F): 95.5 (2018 18:38), Max: 99.1 (2018 01:48)  HR: 56 (2018 18:45) (56 - 75)  BP: 125/61 (2018 18:45) (82/37 - 188/91)  BP(mean): 76 (2018 18:45) (46 - 76)  RR: 22 (2018 18:45) (15 - 22)  SpO2: 98% (2018 18:45) (95% - 100%)  CBC Full  -  ( 2018 12:10 )  WBC Count : 13.0 K/uL  Hemoglobin : 14.3 g/dL  Hematocrit : 44.0 %  Platelet Count - Automated : 239 K/uL  Mean Cell Volume : 96.0 fl  Mean Cell Hemoglobin : 31.2 pg  Mean Cell Hemoglobin Concentration : 32.5 gm/dL  Auto Neutrophil # : x  Auto Lymphocyte # : x  Auto Monocyte # : x  Auto Eosinophil # : x  Auto Basophil # : x  Auto Neutrophil % : x  Auto Lymphocyte % : x  Auto Monocyte % : x  Auto Eosinophil % : x  Auto Basophil % : x    PTT - ( 2018 02:56 )  PTT:26.4 sec      137  |  103  |  18  ----------------------------<  150<H>  3.3<L>   |  24  |  0.79    Ca    8.8      2018 12:10    TPro  8.1  /  Alb  3.5  /  TBili  1.3<H>  /  DBili  x   /  AST  55<H>  /  ALT  29  /  AlkPhos  136<H>  -16      REVIEW OF SYSTEMS; limited 2/2 mental status s/p OR      Physical Exam:   Constitutional: NAD, well-groomed, well-developed  HEENT: PERRLA, EOMI, no drainage or redness  Neck: No bruits; no thyromegaly or nodules,  No JVD  Respiratory: Breath Sounds equal & clear to percussion & auscultation; intubated  Cardiovascular: Regular rate & rhythm, normal S1, S2; no murmurs, gallops or rubs; no S3, S4  Gastrointestinal: Soft, non-tender, non distended no hepatosplenomegaly, normal bowel sounds  Extremities: No peripheral edema, No cyanosis, clubbing   Vascular: Equal and normal pulses: 1+ peripheral pulses   Neurological: GCS:  Moves all extremities, sedated  Musculoskeletal: No joint swelling or deformity; no limitation of movement  Skin: No rashes

## 2018-11-16 NOTE — CONSULT NOTE ADULT - SUBJECTIVE AND OBJECTIVE BOX
Patient is a 89y old  Female who presents with a chief complaint of small bowel obstruction (2018 18:44)      Initial HPI on admission:  HPI:  89 y/o female with h/o c-sections, open solange, poss hysterectomy(son unsure)  PMH-HTN  c/o abd pain, n/v since last night with PO intake  last bm/flatus Wed  no f/c    < from: CT Abdomen and Pelvis w/ IV Cont (18 @ 04:07) >  There is a small hiatus hernia. There are numerous dilated fluid-filled   small bowel loops predominantly in the pelvis with multiple converging   transition points in the right lower quadrant most compatible with a   closed loop small bowel obstruction. There is resultant upstream   dilatation of proximal small bowel loops. There is mild thickening of a   segment of the dilated small bowel in the right lower quadrant with   mesenteric edema and small volume ascites which raises concern for bowel   ischemia. A normal appendix is identified. There is colonic   diverticulosis without evidence of diverticulitis. There is no   pneumoperitoneum. 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    < end of copied text > (2018 06:14)      BRIEF HOSPITAL COURSE: ***    PAST MEDICAL & SURGICAL HISTORY:  Arthritis  HTN (hypertension)    Allergies    No Known Allergies    Intolerances      FAMILY HISTORY:    Social history reviewed: ***    Review of Systems:  CONSTITUTIONAL: No fever, chills, or fatigue  EYES: No eye pain, visual disturbances, or discharge  ENMT:  No difficulty hearing, tinnitus, vertigo; No sinus or throat pain  NECK: No pain or stiffness  RESPIRATORY: No cough, wheezing, chills or hemoptysis; No shortness of breath  CARDIOVASCULAR: No chest pain, palpitations, dizziness, or leg swelling  GASTROINTESTINAL: No abdominal or epigastric pain. No nausea, vomiting, or hematemesis; No diarrhea or constipation. No melena or hematochezia.  GENITOURINARY: No dysuria, frequency, hematuria, or incontinence  NEUROLOGICAL: No headaches, memory loss, loss of strength, numbness, or tremors  SKIN: No itching, burning, rashes, or lesions   MUSCULOSKELETAL: No joint pain or swelling; No muscle, back, or extremity pain  PSYCHIATRIC: No depression, anxiety, mood swings, or difficulty sleeping      Medications:  chlorhexidine 4% Liquid 1 Application(s) Topical two times a day  heparin  Injectable 5000 Unit(s) SubCutaneous every 8 hours  piperacillin/tazobactam IVPB. 3.375 Gram(s) IV Intermittent every 8 hours  potassium chloride  10 mEq/100 mL IVPB 10 milliEquivalent(s) IV Intermittent every 1 hour      vent settings  Mode: AC/ CMV (Assist Control/ Continuous Mandatory Ventilation)  RR (machine): 12  TV (machine): 400  FiO2: 50  PEEP: 5  ITime: 1  MAP: 10  PIP: 20      Vital Signs Last 24 Hrs  T(C): 35.3 (2018 18:38), Max: 37.3 (2018 01:48)  T(F): 95.5 (2018 18:38), Max: 99.1 (2018 01:48)  HR: 58 (2018 19:00) (56 - 75)  BP: 152/70 (2018 19:00) (82/37 - 188/91)  BP(mean): 91 (2018 19:00) (46 - 91)  RR: 22 (2018 19:00) (15 - 22)  SpO2: 100% (2018 19:00) (95% - 100%)    ABG - ( 2018 18:46 )  pH, Arterial: 7.49  pH, Blood: x     /  pCO2: 29    /  pO2: 123   / HCO3: 22    / Base Excess: -0.5  /  SaO2: 99            LABS:                        11.8   5.2   )-----------( 174      ( 2018 18:51 )             36.5     -16    143  |  109<H>  |  19<H>  ----------------------------<  110<H>  3.3<L>   |  23  |  0.74    Ca    7.3<L>      2018 18:51  Phos  2.8     -  Mg     1.5     -    TPro  8.1  /  Alb  3.5  /  TBili  1.3<H>  /  DBili  x   /  AST  55<H>  /  ALT  29  /  AlkPhos  136<H>  -16      CARDIAC MARKERS ( 2018 14:03 )  0.136 ng/mL / x     / 102 U/L / x     / 6.7 ng/mL  CARDIAC MARKERS ( 2018 02:56 )  0.064 ng/mL / x     / x     / x     / x          CAPILLARY BLOOD GLUCOSE        PTT - ( 2018 02:56 )  PTT:26.4 sec  Urinalysis Basic - ( 2018 05:10 )    Color: Yellow / Appearance: Clear / S.010 / pH: x  Gluc: x / Ketone: Trace  / Bili: Negative / Urobili: Negative   Blood: x / Protein: 100 / Nitrite: Negative   Leuk Esterase: Small / RBC: 2-5 /HPF / WBC 3-5 /HPF   Sq Epi: x / Non Sq Epi: Few /HPF / Bacteria: Trace /HPF      CULTURES:        Physical Examination:    General: No acute distress.      HEENT: Pupils equal, reactive to light.  Symmetric. ETT    PULM: Clear to auscultation bilaterally, no significant sputum production    CVS: Regular rate and rhythm, no murmurs, rubs, or gallops    ABD: midline surgical dressing .. clean    EXT: No edema, nontender    SKIN: Warm and well perfused, no rashes noted.    NEURO: sedated    RADIOLOGY REVIEWED ***    CXR:< from: Xray Chest 1 View- PORTABLE-Urgent (18 @ 19:43) >  INTERPRETATION:  CLINICAL STATEMENT: Follow-up chest pain.    TECHNIQUE: AP view of the chest.    COMPARISON: 2018 at 4:33 PM    FINDINGS/  IMPRESSION:  ET tube and right central line again noted. New feeding tube noted tip   under the diaphragm overlying the expected region of stomach. No   pneumothorax.    Tip of ET tube just above the coco. Repositioning recommended    Increased interstitial lung markings without significant change.    Elevation right hemidiaphragm. No new consolidation or pleural effusion.    Heart size cannot be accurately assessed in this projection, but appear   enlarged.      < end of copied text >        IMPRESSION:PAST MEDICAL & SURGICAL HISTORY:  Arthritis  HTN (hypertension)   p/w     IMP: This is a 89 yr old woman with prior mentioned medical condition presented abdominal burning, vomiting due to SBO.  EX-LAP with MARIA ISABEL. No bowel resection. Post op Resp Failure requiring vent support.  Plan:    CNS: sedate and pain control    PULMONARY: continue vent support     CARDIAC: hemodynamic support    GI: NPO. PPI iv    RENAL: mesa, ivf, monitor out put    SKIN: wound care    MUSCULOSKELETAL: boots    ID: antibx , f/u cultures    HEME: monitor    DVT and GI Prophylaxis          Plan of care discussed with family and ICU team.    CRITICAL CARE TIME SPENT:  38 minutes

## 2018-11-16 NOTE — H&P ADULT - ASSESSMENT
89 y/o female with sig surgical history with onset abd pain, n/v last night  CT concerning for closed loop bowel obstruction  NGT placed by ED  IV fluid bolused  family deciding on surgery as option for patient

## 2018-11-16 NOTE — ED PROVIDER NOTE - PHYSICAL EXAMINATION
pt is uncomfortable appearing   conjunctiva are pink  abdomen is distended, soft and has mild generalized tenderness to palpation

## 2018-11-16 NOTE — CONSULT NOTE ADULT - PROBLEM SELECTOR RECOMMENDATION 3
patient has elevated troponins  -could be due to new Afib  -could be due to new CAD  -could be demand ischemia (type 2 MI) due to stress of SBO  will trend troponins and check ekg if troponin is elevated  will check 2D echo for eval of LV Differentials include Afib/CAD or demand ischemia (type 2 MI) due to stress of SBO  - EKG Afib w/ no sig ST changes  - CE trend; 0.06 to 0.136  ***F/u serial cardiac enzymes

## 2018-11-16 NOTE — H&P ADULT - NSHPPHYSICALEXAM_GEN_ALL_CORE
awake, alert  appears in discomfort  S1S2  good b/l air entry  abd soft but distended, generalized tenderness, guarding

## 2018-11-16 NOTE — CONSULT NOTE ADULT - ASSESSMENT
89 F w/ PSH h/o c-sections, open solange, poss hysterectomy(son unsure)  PMH-HTN  c/o abd pain, n/v since last night with PO intake  last bm/flatus Wed  no f/c     surgical history with onset abd pain, n/v last night 89 F PMHx HTN and a significant PSH , Open cholecystectomy, and Hysterectomy presents to ED c/o constant burning abdominal pain - admitted to the ICU for post OP monitoring for exploratory laparotomy of SBO 2/2 adhesions

## 2018-11-16 NOTE — H&P ADULT - HISTORY OF PRESENT ILLNESS
89 y/o female with h/o c-sections, open solange, poss hysterectomy(son unsure)  PMH-HTN  c/o abd pain, n/v since last night with PO intake  last bm/flatus Wed  no f/c    < from: CT Abdomen and Pelvis w/ IV Cont (11.16.18 @ 04:07) >  There is a small hiatus hernia. There are numerous dilated fluid-filled   small bowel loops predominantly in the pelvis with multiple converging   transition points in the right lower quadrant most compatible with a   closed loop small bowel obstruction. There is resultant upstream   dilatation of proximal small bowel loops. There is mild thickening of a   segment of the dilated small bowel in the right lower quadrant with   mesenteric edema and small volume ascites which raises concern for bowel   ischemia. A normal appendix is identified. There is colonic   diverticulosis without evidence of diverticulitis. There is no   pneumoperitoneum. 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    < end of copied text >

## 2018-11-16 NOTE — BRIEF OPERATIVE NOTE - PROCEDURE
<<-----Click on this checkbox to enter Procedure Lysis of adhesions for small bowel obstruction  11/16/2018    Active  [id number]  Exploratory laparotomy  11/16/2018    Active  [id number]

## 2018-11-16 NOTE — ED ADULT NURSE REASSESSMENT NOTE - NS ED NURSE REASSESS COMMENT FT1
NGT placed by MD to continuous suctioning pt tolerated procedure well. 200 ml green liquid in cannister. to be admitted son made aware. Surgery consult.
PT. with SBO, lactate sent pt is still uncomfortable currently is being evaluated by residents. Family is with pt made aware. NGT pending.
Pt was evaluated by Surgery who also spoke to Andres and Josh re surgical option. As per Josh Campos is the Primary care giver.
pt to CT nitro and morphine 4 mg given
pt vomited food particles with yellow liquid. pt appears to feel better after.
pt appears comfortable and free from s/s of distress. Son at bedside.
pt with NGT due to SBO, started on antibiotic therapy Piperacillin 3.375mg iv, Brothers notified by Md. Pt appears to much calmer since the insertion of NGT was seen by Admitting Team. to be admitted to [facility].

## 2018-11-16 NOTE — CONSULT NOTE ADULT - PROBLEM SELECTOR RECOMMENDATION 5
patient is on morphine for abd pain  -will hold home advil for arthritis pain DVT PPx w/ HSQ and GI PPx w/ PPI

## 2018-11-16 NOTE — PROGRESS NOTE ADULT - SUBJECTIVE AND OBJECTIVE BOX
Post Op    Pt s/p exp lap, MARIA ISABEL    PE:  Intubated    Vital Signs Last 24 Hrs  T(C): 35.3 (16 Nov 2018 18:38), Max: 37.3 (16 Nov 2018 01:48)  T(F): 95.5 (16 Nov 2018 18:38), Max: 99.1 (16 Nov 2018 01:48)  HR: 64 (16 Nov 2018 20:31) (56 - 75)  BP: 172/71 (16 Nov 2018 20:00) (82/37 - 188/91)  BP(mean): 92 (16 Nov 2018 20:00) (46 - 92)  RR: 17 (16 Nov 2018 20:00) (15 - 22)  SpO2: 100% (16 Nov 2018 20:31) (95% - 100%)    Chest: B/L BS, decreased at bases  CVS: S1S2, irregular  Abd: soft, ND, dressing C/D/I; NGT with minimal bilious drainage  Ext: venodynes in place     I&O's Detail    16 Nov 2018 07:01  -  16 Nov 2018 21:14  --------------------------------------------------------  IN:    Lactated Ringers IV Bolus: 1000 mL    Solution: 100 mL    Solution: 50 mL  Total IN: 1150 mL    OUT:    Ureteral Catheter: 550 mL  Total OUT: 550 mL    Total NET: 600 mL                            11.8   5.2   )-----------( 174      ( 16 Nov 2018 18:51 )             36.5     11-16    143  |  109<H>  |  19<H>  ----------------------------<  110<H>  3.3<L>   |  23  |  0.74    Ca    7.3<L>      16 Nov 2018 18:51  Phos  2.8     11-16  Mg     1.5     11-16    TPro  8.1  /  Alb  3.5  /  TBili  1.3<H>  /  DBili  x   /  AST  55<H>  /  ALT  29  /  AlkPhos  136<H>  11-16    MEDICATIONS  (STANDING):  chlorhexidine 4% Liquid 1 Application(s) Topical two times a day  fentaNYL   Infusion 0.5 MICROgram(s)/kG/Hr (2.92 mL/Hr) IV Continuous <Continuous>  heparin  Injectable 5000 Unit(s) SubCutaneous every 8 hours  piperacillin/tazobactam IVPB. 3.375 Gram(s) IV Intermittent every 8 hours  potassium chloride  10 mEq/100 mL IVPB 10 milliEquivalent(s) IV Intermittent every 1 hour    MEDICATIONS  (PRN):

## 2018-11-16 NOTE — ED PROVIDER NOTE - OBJECTIVE STATEMENT
89 y/o F pt with PMHx HTN presents to ED c/o constant burning abdominal pain since 8 pm associated with vomiting. Pt is was unable to hold down prunes or soup that her son tried to give. Son gave pt 2 Advil tablets and anti-acids with no relief. Pt was c/o difficulty breathing earlier in the day but symptoms have resolved since then. Patient denies diarrhea, fever, chest pain or any other acute complaints at this time.

## 2018-11-16 NOTE — CONSULT NOTE ADULT - PROBLEM SELECTOR RECOMMENDATION 9
S/p OR 11/16 exploratory laprarotomy w/ MARIA ISABEL; no bowel resection  - NGT placed by ED; c/w on low suction and monitor IOs  - C/w S/p OR 11/16 exploratory laparotomy w/ MARIA ISABEL; no bowel resection  - 3 hour surgical course, net + 3L IVFs,   - NGT placed by E; c/w on low suction x 24 hours and monitor IOs  - C/w mechanical ventilation; ABG mild respiratory alkalosis  - C/w Zosyn x 11/16 as per Dr Silva  Surgery following;   ***F/u post OP labs and imagining; ETT tube withdrawn 1 cm as per anesthesia

## 2018-11-16 NOTE — PROGRESS NOTE ADULT - ASSESSMENT
Patient with clinically worsening sx's of abdominal pain and increased WBC.  Her abdominal exam has worsened.    Discussed options of continued observation vs urgent laparotomy and lysis of adhesions.  With observation, she may develop infarcted SB.  Will schedule for laparotomy; lysis of adhesions; possible bowel resection; and repair of the umbilical hernia.  Risks of bleeding, infection, MI, stroke, prolonged mechanical ventilation discussed with the patient and family (son and grandaughter).  They are agreeable to the surgical procedure.

## 2018-11-16 NOTE — CONSULT NOTE ADULT - PROBLEM SELECTOR RECOMMENDATION 9
CT concerning for closed loop bowel obstruction  NGT placed by ED - on low suction  IV fluid bolused - continue iv fluids for hydration  family deciding on surgery as option for patient

## 2018-11-16 NOTE — CONSULT NOTE ADULT - SUBJECTIVE AND OBJECTIVE BOX
Ladarius Brown MD  Interventional Cardiology   [place] Office : [address] [place] [address] [place]. [address]  Tel:   [place] Office : [address] [facility] [place] [address]  Tel: [phone]  Cell : [phone]    HISTORY OF PRESENT ILLNESS: History obtained from family at bedside as pt intubated and sedated     89 y/o female with PMH of HTN and multiple abd surgeries in past who presented with sudden onset abd pain this AM found to have SBO.  CT scan reveals possible closed loop obstruction in the RLQ with possible ischemia s/p Exploratory laparotomy Lysis of adhesions for small bowel obstruction. Transferred to ICU. Family denies any h/o MI , irregular heart rhythm, Heart failure. Son lives on the bottom floor and denies any ongoing CP, SOB at home prior to admission.     	        PAST MEDICAL & SURGICAL HISTORY:  Arthritis  HTN (hypertension)    	    MEDICATIONS:  heparin  Injectable 5000 Unit(s) SubCutaneous every 8 hours  piperacillin/tazobactam IVPB. 3.375 Gram(s) IV Intermittent every 8 hours  chlorhexidine 4% Liquid 1 Application(s) Topical two times a day  potassium chloride  10 mEq/100 mL IVPB 10 milliEquivalent(s) IV Intermittent every 1 hour      FAMILY HISTORY:        Allergies    No Known Allergies    Intolerances    	      PHYSICAL EXAM:  T(C): 35.3 (11-16-18 @ 18:38), Max: 37.3 (11-16-18 @ 01:48)  HR: 64 (11-16-18 @ 20:31) (56 - 75)  BP: 172/71 (11-16-18 @ 20:00) (82/37 - 188/91)  RR: 17 (11-16-18 @ 20:00) (15 - 22)  SpO2: 100% (11-16-18 @ 20:31) (95% - 100%)  Wt(kg): --  I&O's Summary    16 Nov 2018 07:01  -  16 Nov 2018 20:49  --------------------------------------------------------  IN: 1150 mL / OUT: 550 mL / NET: 600 mL        Appearance: Intubated and sedated 	  HEENT:   Normal oral mucosa,	  Cardiovascular: Normal S1 S2, No JVD, 2/6 Systolic murmur +, No edema  Respiratory: Mech BS BlL 	  Gastrointestinal:  Dressing intact   Extremities: No clubbing, cyanosis or edema, warm to touch     LABS:	 	    CARDIAC MARKERS:  Troponin I, Serum: 0.136 ng/mL (11-16 @ 14:03)  Troponin I, Serum: 0.064 ng/mL (11-16 @ 02:56)                                  11.8   5.2   )-----------( 174      ( 16 Nov 2018 18:51 )             36.5     11-16    143  |  109<H>  |  19<H>  ----------------------------<  110<H>  3.3<L>   |  23  |  0.74    Ca    7.3<L>      16 Nov 2018 18:51  Phos  2.8     11-16  Mg     1.5     11-16    TPro  8.1  /  Alb  3.5  /  TBili  1.3<H>  /  DBili  x   /  AST  55<H>  /  ALT  29  /  AlkPhos  136<H>  11-16    proBNP: Serum Pro-Brain Natriuretic Peptide: 39394 pg/mL (11-16 @ 02:56)    Lipid Profile:   HgA1c:   TSH:

## 2018-11-16 NOTE — ED ADULT NURSE NOTE - NSIMPLEMENTINTERV_GEN_ALL_ED
Implemented All Universal Safety Interventions:  [employer] to call system. Call bell, personal items and telephone within reach. Instruct patient to call for assistance. Room bathroom lighting operational. Non-slip footwear when patient is off stretcher. Physically safe environment: no spills, clutter or unnecessary equipment. Stretcher in lowest position, wheels locked, appropriate side rails in place.

## 2018-11-16 NOTE — CONSULT NOTE ADULT - PROBLEM SELECTOR RECOMMENDATION 3
patient has elevated troponins  -could be due to new Afib  -could be due to new CAD  -could be demand ischemia (type 2 MI) due to stress of SBO  will trend troponins and check ekg if troponin is elevated  will check 2D echo for eval of LV

## 2018-11-16 NOTE — PROGRESS NOTE ADULT - ASSESSMENT
S/p exp lap, MARIA ISABEL    Hemodynamically stable    -trial for extubation in AM  -NPO, IVF  -Wound ace  -monitor I/o's  -ICU management

## 2018-11-16 NOTE — ED ADULT NURSE NOTE - OBJECTIVE STATEMENT
pt came to ER via EMS accompanied by her son, c/o of generalized abdominal pain. pt is moaning guarding, Pt was seen by Md labs sent, medication given.

## 2018-11-16 NOTE — H&P ADULT - PROBLEM SELECTOR PLAN 1
npo, hydrate, iv abx, NGT, poss pre-op for ex lap, family discussing option for surgery  case discussed earlier with Dr Leonard; coming to asssess and discuss with family

## 2018-11-16 NOTE — PROGRESS NOTE ADULT - SUBJECTIVE AND OBJECTIVE BOX
Patient admitted this AM for SBO.  CT scan reveals possible closed loop obstruction in the RLQ with possible ischemia.  Upon admission, her pain had resolved and she was able to sleep.  However, this PM, her pain is worsened and only slightly controlled with IV Tylenol.  Her WBC increased to 13K (from 8K).    VSS; afebrile  Abdomen:  diffusely tender, more in the RLQ, has mild rebound and minimal guarding.  Ext:  no calf tenderness.

## 2018-11-16 NOTE — CONSULT NOTE ADULT - SUBJECTIVE AND OBJECTIVE BOX
Patient is a 89y old  Female who presents with a chief complaint of sbo (2018 06:14)    Pt has severe osteoarthritis of the knees which need occasional drainage every few weeks and requires lasix on a daily basis to manage. It limits her physical activity. Has no prior cardiac history. Still complaining of abd pain at this time    REVIEW OF SYSTEMS:  CONSTITUTIONAL: No fever, weight loss, or fatigue  EYES: No eye pain, visual disturbances, or discharge  ENMT:  No difficulty hearing, tinnitus, vertigo; No sinus or throat pain  NECK: No pain or stiffness  BREASTS: No pain, masses, or nipple discharge  RESPIRATORY: No cough, wheezing, chills or hemoptysis; No shortness of breath  CARDIOVASCULAR: No chest pain, palpitations, dizziness, or leg swelling  GASTROINTESTINAL: abd pain  GENITOURINARY: No dysuria, frequency, hematuria, or incontinence  NEUROLOGICAL: No headaches, memory loss, loss of strength, numbness, or tremors  SKIN: No itching, burning, rashes, or lesions   LYMPH NODES: No enlarged glands  ENDOCRINE: No heat or cold intolerance; No hair loss  MUSCULOSKELETAL: knee pain and swelling  PSYCHIATRIC: No depression, anxiety, mood swings, or difficulty sleeping  HEME/LYMPH: No easy bruising, or bleeding gums  ALLERY AND IMMUNOLOGIC: No hives or eczema      INTERVAL HPI/OVERNIGHT EVENTS:  T(C): 36.3 (18 @ 09:46), Max: 37.3 (18 @ 01:48)  HR: 74 (18 @ 09:46) (64 - 74)  BP: 181/78 (18 @ 09:46) (171/92 - 188/91)  RR: 20 (18 @ 09:46) (16 - 20)  SpO2: 96% (18 @ 09:46) (95% - 97%)  Wt(kg): --  I&O's Summary      PHYSICAL EXAM:  GENERAL: NAD, well-groomed, well-developed  HEAD:  Atraumatic, Normocephalic  EYES: EOMI, PERRLA, conjunctiva and sclera clear  ENMT: No tonsillar erythema, exudates, or enlargement; Moist mucous membranes, Good dentition, No lesions  NECK: Supple, No JVD, Normal thyroid  NERVOUS SYSTEM:  Alert & Oriented X3, Good concentration; Motor Strength 5/5 B/L upper and lower extremities; DTRs 2+ intact and symmetric  CHEST/LUNG: Clear to percussion bilaterally; No rales, rhonchi, wheezing, or rubs  HEART: irregular rate  ABDOMEN: Soft, no bowel sounds, nondistended, tender in LLQ  EXTREMITIES:  2+ Peripheral Pulses, bilateral knee swelling but no swelling in the feet or ankles  LYMPH: No lymphadenopathy noted  SKIN: No rashes or lesions        LABS:                        13.4   8.0   )-----------( 240      ( 2018 02:56 )             41.6         137  |  101  |  22<H>  ----------------------------<  147<H>  3.8   |  25  |  1.13    Ca    8.9      2018 02:56    TPro  8.1  /  Alb  3.5  /  TBili  1.3<H>  /  DBili  x   /  AST  55<H>  /  ALT  29  /  AlkPhos  136<H>      PTT - ( 2018 02:56 )  PTT:26.4 sec  Urinalysis Basic - ( 2018 05:10 )    Color: Yellow / Appearance: Clear / S.010 / pH: x  Gluc: x / Ketone: Trace  / Bili: Negative / Urobili: Negative   Blood: x / Protein: 100 / Nitrite: Negative   Leuk Esterase: Small / RBC: 2-5 /HPF / WBC 3-5 /HPF   Sq Epi: x / Non Sq Epi: Few /HPF / Bacteria: Trace /HPF      CAPILLARY BLOOD GLUCOSE            Urinalysis Basic - ( 2018 05:10 )    Color: Yellow / Appearance: Clear / S.010 / pH: x  Gluc: x / Ketone: Trace  / Bili: Negative / Urobili: Negative   Blood: x / Protein: 100 / Nitrite: Negative   Leuk Esterase: Small / RBC: 2-5 /HPF / WBC 3-5 /HPF   Sq Epi: x / Non Sq Epi: Few /HPF / Bacteria: Trace /HPF          RADIOLOGY & ADDITIONAL TESTS:    Imaging Personally Reviewed:  [ ] YES  [ ] NO    Consultant(s) Notes Reviewed:  [ ] YES  [ ] NO    Care Discussed with Consultants/Other Providers [ ] YES  [ ] NO

## 2018-11-16 NOTE — CONSULT NOTE ADULT - PROBLEM SELECTOR RECOMMENDATION 4
new Afib since no prior history  -will trend trops as possible that CAD caused the new Afib  -metoprolol 5mg IV PRN for HR > 110 as per the results of the RACE2 trial  -will NOT give full dose anticoagulation at this time as patient may need emergent surgery at any time  -CHADSVASC = 4 which is a 4.8% stroke risk per year, divided into days it is a 0.01% stroke risk per day  -check 2D echo CHADSVASC 4; hold AC 2/2 recent surgery; no need for rate control medications at this time  - F/u TTE and Cardiology consultation

## 2018-11-16 NOTE — CONSULT NOTE ADULT - ASSESSMENT
EKG: Slow Afib 60, RBBB, Q II, III, AvF     Bedside echo: Inferior , inferoseptal hypokinesis EF 35-40%, Mod MR, Mild AI, Moderate TR, RV not visualized, IVC unable to be assessed given surgical dressing     Assessment and Plan     1) NSTEMI: Troponin trending up , Inf, inf-lat WMA, EF 40,  not a candidate for cath given recent surgery, Npo , Rectal Asa and Heparin  when ok form a surgical stand point. Hold beta blockers, serial CE and Repeat EKG     2) Afib : Bradycardic, avoid av franca blockers, Heparin drip when ok with surgery    3) HTN: reported to be hypotensive on arrival to ICU , now hypertensive up to 190's and on fluid bolus , would hold fluids for now , monitor u/o, started on fentanyl drip for pain. Hydral 10mg  q 8 PRN if continues to be hypertensive     4) SBO: s/p surgey management as per surgical team

## 2018-11-16 NOTE — CONSULT NOTE ADULT - PROBLEM SELECTOR RECOMMENDATION 4
new Afib since no prior history  -will trend trops as possible that CAD caused the new Afib  -metoprolol 5mg IV PRN for HR > 110 as per the results of the RACE2 trial  -will NOT give full dose anticoagulation at this time as patient may need emergent surgery at any time  -CHADSVASC = 4 which is a 4.8% stroke risk per year, divided into days it is a 0.01% stroke risk per day  -check 2D echo

## 2018-11-16 NOTE — BRIEF OPERATIVE NOTE - OPERATION/FINDINGS
600mL nonbloody ascites, multiple abdominal adhesions lysed, adhesion of right fallopian tube to small bowel mesentery over a loop of bowel causing closed loop obstruction, compromised bowel congested but viable, palpable mesenteric pulse

## 2018-11-17 LAB
ANION GAP SERPL CALC-SCNC: 12 MMOL/L — SIGNIFICANT CHANGE UP (ref 5–17)
ANION GAP SERPL CALC-SCNC: 9 MMOL/L — SIGNIFICANT CHANGE UP (ref 5–17)
BASE EXCESS BLDA CALC-SCNC: -2.6 MMOL/L — LOW (ref -2–2)
BASOPHILS # BLD AUTO: 0.1 K/UL — SIGNIFICANT CHANGE UP (ref 0–0.2)
BASOPHILS NFR BLD AUTO: 0.8 % — SIGNIFICANT CHANGE UP (ref 0–2)
BLOOD GAS COMMENTS ARTERIAL: SIGNIFICANT CHANGE UP
BUN SERPL-MCNC: 19 MG/DL — HIGH (ref 7–18)
BUN SERPL-MCNC: 20 MG/DL — HIGH (ref 7–18)
CALCIUM SERPL-MCNC: 7.5 MG/DL — LOW (ref 8.4–10.5)
CALCIUM SERPL-MCNC: 7.6 MG/DL — LOW (ref 8.4–10.5)
CHLORIDE SERPL-SCNC: 108 MMOL/L — SIGNIFICANT CHANGE UP (ref 96–108)
CHLORIDE SERPL-SCNC: 108 MMOL/L — SIGNIFICANT CHANGE UP (ref 96–108)
CK MB BLD-MCNC: 8.4 % — HIGH (ref 0–3.5)
CK MB BLD-MCNC: 9.3 % — HIGH (ref 0–3.5)
CK MB CFR SERPL CALC: 6.2 NG/ML — HIGH (ref 0–3.6)
CK MB CFR SERPL CALC: 6.3 NG/ML — HIGH (ref 0–3.6)
CK SERPL-CCNC: 68 U/L — SIGNIFICANT CHANGE UP (ref 21–215)
CK SERPL-CCNC: 74 U/L — SIGNIFICANT CHANGE UP (ref 21–215)
CO2 SERPL-SCNC: 22 MMOL/L — SIGNIFICANT CHANGE UP (ref 22–31)
CO2 SERPL-SCNC: 24 MMOL/L — SIGNIFICANT CHANGE UP (ref 22–31)
CREAT SERPL-MCNC: 0.74 MG/DL — SIGNIFICANT CHANGE UP (ref 0.5–1.3)
CREAT SERPL-MCNC: 0.94 MG/DL — SIGNIFICANT CHANGE UP (ref 0.5–1.3)
CULTURE RESULTS: NO GROWTH — SIGNIFICANT CHANGE UP
EOSINOPHIL # BLD AUTO: 0 K/UL — SIGNIFICANT CHANGE UP (ref 0–0.5)
EOSINOPHIL NFR BLD AUTO: 0.4 % — SIGNIFICANT CHANGE UP (ref 0–6)
GLUCOSE SERPL-MCNC: 122 MG/DL — HIGH (ref 70–99)
GLUCOSE SERPL-MCNC: 123 MG/DL — HIGH (ref 70–99)
HCO3 BLDA-SCNC: 20 MMOL/L — LOW (ref 23–27)
HCT VFR BLD CALC: 38.5 % — SIGNIFICANT CHANGE UP (ref 34.5–45)
HGB BLD-MCNC: 12.6 G/DL — SIGNIFICANT CHANGE UP (ref 11.5–15.5)
HOROWITZ INDEX BLDA+IHG-RTO: 50 — SIGNIFICANT CHANGE UP
LYMPHOCYTES # BLD AUTO: 0.5 K/UL — LOW (ref 1–3.3)
LYMPHOCYTES # BLD AUTO: 6.7 % — LOW (ref 13–44)
MAGNESIUM SERPL-MCNC: 2.1 MG/DL — SIGNIFICANT CHANGE UP (ref 1.6–2.6)
MCHC RBC-ENTMCNC: 31.1 PG — SIGNIFICANT CHANGE UP (ref 27–34)
MCHC RBC-ENTMCNC: 32.7 GM/DL — SIGNIFICANT CHANGE UP (ref 32–36)
MCV RBC AUTO: 95.2 FL — SIGNIFICANT CHANGE UP (ref 80–100)
MONOCYTES # BLD AUTO: 0.7 K/UL — SIGNIFICANT CHANGE UP (ref 0–0.9)
MONOCYTES NFR BLD AUTO: 10.4 % — SIGNIFICANT CHANGE UP (ref 2–14)
NEUTROPHILS # BLD AUTO: 5.8 K/UL — SIGNIFICANT CHANGE UP (ref 1.8–7.4)
NEUTROPHILS NFR BLD AUTO: 81.7 % — HIGH (ref 43–77)
PCO2 BLDA: 30 MMHG — LOW (ref 32–46)
PH BLDA: 7.44 — SIGNIFICANT CHANGE UP (ref 7.35–7.45)
PHOSPHATE SERPL-MCNC: 2.8 MG/DL — SIGNIFICANT CHANGE UP (ref 2.5–4.5)
PLATELET # BLD AUTO: 191 K/UL — SIGNIFICANT CHANGE UP (ref 150–400)
PO2 BLDA: 179 MMHG — HIGH (ref 74–108)
POTASSIUM SERPL-MCNC: 3.4 MMOL/L — LOW (ref 3.5–5.3)
POTASSIUM SERPL-MCNC: 3.8 MMOL/L — SIGNIFICANT CHANGE UP (ref 3.5–5.3)
POTASSIUM SERPL-SCNC: 3.4 MMOL/L — LOW (ref 3.5–5.3)
POTASSIUM SERPL-SCNC: 3.8 MMOL/L — SIGNIFICANT CHANGE UP (ref 3.5–5.3)
RBC # BLD: 4.04 M/UL — SIGNIFICANT CHANGE UP (ref 3.8–5.2)
RBC # FLD: 13.4 % — SIGNIFICANT CHANGE UP (ref 10.3–14.5)
SAO2 % BLDA: >99.1 % — SIGNIFICANT CHANGE UP (ref 92–96)
SODIUM SERPL-SCNC: 141 MMOL/L — SIGNIFICANT CHANGE UP (ref 135–145)
SODIUM SERPL-SCNC: 142 MMOL/L — SIGNIFICANT CHANGE UP (ref 135–145)
SPECIMEN SOURCE: SIGNIFICANT CHANGE UP
TROPONIN I SERPL-MCNC: 0.19 NG/ML — HIGH (ref 0–0.04)
TROPONIN I SERPL-MCNC: 0.19 NG/ML — HIGH (ref 0–0.04)
WBC # BLD: 7.1 K/UL — SIGNIFICANT CHANGE UP (ref 3.8–10.5)
WBC # FLD AUTO: 7.1 K/UL — SIGNIFICANT CHANGE UP (ref 3.8–10.5)

## 2018-11-17 PROCEDURE — 71045 X-RAY EXAM CHEST 1 VIEW: CPT | Mod: 26

## 2018-11-17 RX ORDER — SODIUM CHLORIDE 9 MG/ML
500 INJECTION, SOLUTION INTRAVENOUS ONCE
Qty: 0 | Refills: 0 | Status: COMPLETED | OUTPATIENT
Start: 2018-11-17 | End: 2018-11-17

## 2018-11-17 RX ORDER — POTASSIUM CHLORIDE 20 MEQ
10 PACKET (EA) ORAL
Qty: 0 | Refills: 0 | Status: COMPLETED | OUTPATIENT
Start: 2018-11-17 | End: 2018-11-17

## 2018-11-17 RX ORDER — SODIUM CHLORIDE 9 MG/ML
1000 INJECTION, SOLUTION INTRAVENOUS ONCE
Qty: 0 | Refills: 0 | Status: COMPLETED | OUTPATIENT
Start: 2018-11-17 | End: 2018-11-17

## 2018-11-17 RX ORDER — PANTOPRAZOLE SODIUM 20 MG/1
40 TABLET, DELAYED RELEASE ORAL DAILY
Qty: 0 | Refills: 0 | Status: DISCONTINUED | OUTPATIENT
Start: 2018-11-17 | End: 2018-11-21

## 2018-11-17 RX ORDER — NOREPINEPHRINE BITARTRATE/D5W 8 MG/250ML
0.05 PLASTIC BAG, INJECTION (ML) INTRAVENOUS
Qty: 16 | Refills: 0 | Status: DISCONTINUED | OUTPATIENT
Start: 2018-11-17 | End: 2018-11-19

## 2018-11-17 RX ORDER — SODIUM CHLORIDE 9 MG/ML
500 INJECTION INTRAMUSCULAR; INTRAVENOUS; SUBCUTANEOUS ONCE
Qty: 0 | Refills: 0 | Status: COMPLETED | OUTPATIENT
Start: 2018-11-17 | End: 2018-11-17

## 2018-11-17 RX ORDER — PROPOFOL 10 MG/ML
5 INJECTION, EMULSION INTRAVENOUS
Qty: 500 | Refills: 0 | Status: DISCONTINUED | OUTPATIENT
Start: 2018-11-17 | End: 2018-11-17

## 2018-11-17 RX ORDER — NOREPINEPHRINE BITARTRATE/D5W 8 MG/250ML
0.05 PLASTIC BAG, INJECTION (ML) INTRAVENOUS
Qty: 8 | Refills: 0 | Status: DISCONTINUED | OUTPATIENT
Start: 2018-11-17 | End: 2018-11-17

## 2018-11-17 RX ORDER — FENTANYL CITRATE 50 UG/ML
2 INJECTION INTRAVENOUS
Qty: 2500 | Refills: 0 | Status: DISCONTINUED | OUTPATIENT
Start: 2018-11-17 | End: 2018-11-18

## 2018-11-17 RX ORDER — FENTANYL CITRATE 50 UG/ML
50 INJECTION INTRAVENOUS ONCE
Qty: 0 | Refills: 0 | Status: DISCONTINUED | OUTPATIENT
Start: 2018-11-17 | End: 2018-11-17

## 2018-11-17 RX ORDER — DEXMEDETOMIDINE HYDROCHLORIDE IN 0.9% SODIUM CHLORIDE 4 UG/ML
0.5 INJECTION INTRAVENOUS
Qty: 200 | Refills: 0 | Status: DISCONTINUED | OUTPATIENT
Start: 2018-11-17 | End: 2018-11-19

## 2018-11-17 RX ADMIN — Medication 100 MILLIEQUIVALENT(S): at 10:30

## 2018-11-17 RX ADMIN — SODIUM CHLORIDE 500 MILLILITER(S): 9 INJECTION INTRAMUSCULAR; INTRAVENOUS; SUBCUTANEOUS at 18:43

## 2018-11-17 RX ADMIN — HEPARIN SODIUM 5000 UNIT(S): 5000 INJECTION INTRAVENOUS; SUBCUTANEOUS at 22:36

## 2018-11-17 RX ADMIN — SODIUM CHLORIDE 2000 MILLILITER(S): 9 INJECTION, SOLUTION INTRAVENOUS at 04:38

## 2018-11-17 RX ADMIN — PIPERACILLIN AND TAZOBACTAM 25 GRAM(S): 4; .5 INJECTION, POWDER, LYOPHILIZED, FOR SOLUTION INTRAVENOUS at 06:06

## 2018-11-17 RX ADMIN — Medication 100 MILLIEQUIVALENT(S): at 09:30

## 2018-11-17 RX ADMIN — Medication 2.74 MICROGRAM(S)/KG/MIN: at 04:37

## 2018-11-17 RX ADMIN — DEXMEDETOMIDINE HYDROCHLORIDE IN 0.9% SODIUM CHLORIDE 7.3 MICROGRAM(S)/KG/HR: 4 INJECTION INTRAVENOUS at 19:30

## 2018-11-17 RX ADMIN — PROPOFOL 1.75 MICROGRAM(S)/KG/MIN: 10 INJECTION, EMULSION INTRAVENOUS at 04:38

## 2018-11-17 RX ADMIN — CHLORHEXIDINE GLUCONATE 1 APPLICATION(S): 213 SOLUTION TOPICAL at 06:07

## 2018-11-17 RX ADMIN — PIPERACILLIN AND TAZOBACTAM 25 GRAM(S): 4; .5 INJECTION, POWDER, LYOPHILIZED, FOR SOLUTION INTRAVENOUS at 14:08

## 2018-11-17 RX ADMIN — HEPARIN SODIUM 5000 UNIT(S): 5000 INJECTION INTRAVENOUS; SUBCUTANEOUS at 14:07

## 2018-11-17 RX ADMIN — HEPARIN SODIUM 5000 UNIT(S): 5000 INJECTION INTRAVENOUS; SUBCUTANEOUS at 06:07

## 2018-11-17 RX ADMIN — PIPERACILLIN AND TAZOBACTAM 25 GRAM(S): 4; .5 INJECTION, POWDER, LYOPHILIZED, FOR SOLUTION INTRAVENOUS at 22:36

## 2018-11-17 RX ADMIN — SODIUM CHLORIDE 2000 MILLILITER(S): 9 INJECTION, SOLUTION INTRAVENOUS at 03:05

## 2018-11-17 RX ADMIN — PANTOPRAZOLE SODIUM 40 MILLIGRAM(S): 20 TABLET, DELAYED RELEASE ORAL at 14:06

## 2018-11-17 RX ADMIN — DEXMEDETOMIDINE HYDROCHLORIDE IN 0.9% SODIUM CHLORIDE 7.3 MICROGRAM(S)/KG/HR: 4 INJECTION INTRAVENOUS at 09:21

## 2018-11-17 RX ADMIN — CHLORHEXIDINE GLUCONATE 1 APPLICATION(S): 213 SOLUTION TOPICAL at 18:41

## 2018-11-17 NOTE — PROGRESS NOTE ADULT - PROBLEM SELECTOR PLAN 3
HADSVASC 4; hold AC 2/2 recent surgery; no need for rate control medications at this time  - F/u TTE and Cardiology consultation.

## 2018-11-17 NOTE — PROGRESS NOTE ADULT - ASSESSMENT
89F POD#1 s/p ex lap, MARIA ISABEL requiring minimal pressor support, but stable  - wean sedation  - wean to extubate  - wean off pressors  - dressing change on monday 11/19, can change top dressing if soiled  - cont NGT to low intermittent suction  - electrolyte repletion  - fluid resuscitation  - cont mesa, monitor UOP  - care as per ICU team 89F POD#1 s/p ex lap, MARIA ISABEL requiring minimal pressor support, but stable  - wean sedation  - wean to extubate  - wean off pressors  - dressing change on monday 11/19, can change top dressing if soiled  - cont NGT to low intermittent suction  - electrolyte repletion  - fluid resuscitation  - cont mesa, monitor UOP  - cont abx as per ID  - care as per ICU team

## 2018-11-17 NOTE — PROGRESS NOTE ADULT - SUBJECTIVE AND OBJECTIVE BOX
INTERVAL HPI/OVERNIGHT EVENTS: No acute overnight events except pt had sinus bradycardia.      ANTIBIOTICS: Zosyn                DATE STARTED:     Antimicrobial:  piperacillin/tazobactam IVPB. 3.375 Gram(s) IV Intermittent every 8 hours    Cardiovascular:    Pulmonary:    Hematalogic:  heparin  Injectable 5000 Unit(s) SubCutaneous every 8 hours    Other:  chlorhexidine 4% Liquid 1 Application(s) Topical two times a day  fentaNYL   Infusion 0.5 MICROgram(s)/kG/Hr IV Continuous <Continuous>      Drug Dosing Weight  Height (cm): 167.64 (2018 18:38)  Weight (kg): 58.4 (2018 18:38)  BMI (kg/m2): 20.8 (2018 18:38)  BSA (m2): 1.66 (2018 18:38)    CENTRAL LINE: [ ] YES [ ] NO  LOCATION:   DATE INSERTED:  REMOVE: [ ] YES [ ] NO  EXPLAIN:    ROSADO: [ ] YES [ ] NO    DATE INSERTED:  REMOVE:  [ ] YES [ ] NO  EXPLAIN:    A-LINE:  [ ] YES [ ] NO  LOCATION:   DATE INSERTED:  REMOVE:  [ ] YES [ ] NO  EXPLAIN:    PMH/Social Hx/Fam Hx -reviewed admission note, no change since admission  PAST MEDICAL & SURGICAL HISTORY:  Arthritis  HTN (hypertension)    Heart faliure: acute [ ] chronic [ ] acute or chronic [ ] diastolic [ ] systolic [ ] combied systolic and diastolic[ ]  MARLEN: ATN[ ] renal medullary necrosis [ ] CKD I [ ]CKDII [ ]CKD III [ ]CKD IV [ ]CKD V [ ]Other pathological lesions [ ]  Abdominal Nutrition Status: malnutrition [ ] cachexia [ ] morbid obesity/BMI=40 [ ] Supplement ordered [___________]     T(C): 35.3 (18 @ 18:38), Max: 37.3 (18 @ 01:48)  HR: 53 (18 @ 00:00)  BP: 104/49 (18 @ 00:00)  BP(mean): 63 (18 @ 00:00)  ABP: --  ABP(mean): --  RR: 20 (18 @ 00:00)  SpO2: 100% (18 @ 00:00)  Wt(kg): --    ABG - ( 2018 18:46 )  pH, Arterial: 7.49  pH, Blood: x     /  pCO2: 29    /  pO2: 123   / HCO3: 22    / Base Excess: -0.5  /  SaO2: 99          Mode: AC/ CMV (Assist Control/ Continuous Mandatory Ventilation)  RR (machine): 12  TV (machine): 400  FiO2: 50  PEEP: 5  ITime: 1  MAP: 11  PIP: 21      PHYSICAL EXAM:    GENERAL: [ ]NAD, [ ]well-groomed, [ ]well-developed  HEAD:  [ ]Atraumatic, [ ]Normocephalic  EYES: [ ]EOMI, [ ]PERRLA, [ ]conjunctiva and sclera clear  ENMT: [ ]No tonsillar erythema, exudates, or enlargement; [ ]Moist mucous membranes, [ ]Good dentition, [ ]No lesions  NECK: [ ]Supple, normal appearance, [ ]No JVD; [ ]Normal thyroid; [ ]Trachea midline  NERVOUS SYSTEM:  [ ]Alert & Oriented X3, [ ]Good concentration; [ ]Motor Strength 5/5 B/L upper and lower extremities; [ ]DTRs 2+ intact and symmetric  CHEST/LUNG: [ ]No chest deformity; [ ]Normal percussion bilaterally; [ ]No rales, rhonchi, wheezing; [ ]Crackles at bases  HEART: [ ]Regular rate and rhythm; [ ]No murmurs, rubs, or gallops  ABDOMEN: [ ]Soft, Nontender, Nondistended; [ ]Bowel sounds present  EXTREMITIES:  [ ]2+ Peripheral Pulses, [ ]No clubbing, cyanosis, or edema [ ]Bilat lower extremity edema  LYMPH: [ ]No lymphadenopathy noted  SKIN: [ ]No rashes or lesions; [ ]Good capillary refill      LABS:  CBC Full  -  ( 2018 18:51 )  WBC Count : 5.2 K/uL  Hemoglobin : 11.8 g/dL  Hematocrit : 36.5 %  Platelet Count - Automated : 174 K/uL  Mean Cell Volume : 94.9 fl  Mean Cell Hemoglobin : 30.8 pg  Mean Cell Hemoglobin Concentration : 32.5 gm/dL  Auto Neutrophil # : 4.4 K/uL  Auto Lymphocyte # : 0.3 K/uL  Auto Monocyte # : 0.5 K/uL  Auto Eosinophil # : 0.0 K/uL  Auto Basophil # : 0.0 K/uL  Auto Neutrophil % : 84.7 %  Auto Lymphocyte % : 5.0 %  Auto Monocyte % : 9.5 %  Auto Eosinophil % : 0.2 %  Auto Basophil % : 0.6 %        143  |  109<H>  |  19<H>  ----------------------------<  110<H>  3.3<L>   |  23  |  0.74    Ca    7.3<L>      2018 18:51  Phos  2.8       Mg     1.5         TPro  8.1  /  Alb  3.5  /  TBili  1.3<H>  /  DBili  x   /  AST  55<H>  /  ALT  29  /  AlkPhos  136<H>      PTT - ( 2018 02:56 )  PTT:26.4 sec  Urinalysis Basic - ( 2018 05:10 )    Color: Yellow / Appearance: Clear / S.010 / pH: x  Gluc: x / Ketone: Trace  / Bili: Negative / Urobili: Negative   Blood: x / Protein: 100 / Nitrite: Negative   Leuk Esterase: Small / RBC: 2-5 /HPF / WBC 3-5 /HPF   Sq Epi: x / Non Sq Epi: Few /HPF / Bacteria: Trace /HPF          RADIOLOGY & ADDITIONAL STUDIES REVIEWED:      [ ]GOALS OF CARE DISCUSSION WITH PATIENT/FAMILY/PROXY:    CRITICAL CARE TIME SPENT: 35 minutes INTERVAL HPI/OVERNIGHT EVENTS: No acute overnight events except pt had sinus bradycardia.      ANTIBIOTICS: Zosyn                DATE STARTED:     Antimicrobial:  piperacillin/tazobactam IVPB. 3.375 Gram(s) IV Intermittent every 8 hours    Cardiovascular:    Pulmonary:    Hematalogic:  heparin  Injectable 5000 Unit(s) SubCutaneous every 8 hours    Other:  chlorhexidine 4% Liquid 1 Application(s) Topical two times a day  fentaNYL   Infusion 0.5 MICROgram(s)/kG/Hr IV Continuous <Continuous>      Drug Dosing Weight  Height (cm): 167.64 (2018 18:38)  Weight (kg): 58.4 (2018 18:38)  BMI (kg/m2): 20.8 (2018 18:38)  BSA (m2): 1.66 (2018 18:38)    CENTRAL LINE: [ ] YES [ ] NO  LOCATION:   DATE INSERTED:  REMOVE: [ ] YES [ ] NO  EXPLAIN:    ROSADO: [ ] YES [ ] NO    DATE INSERTED:  REMOVE:  [ ] YES [ ] NO  EXPLAIN:    A-LINE:  [ ] YES [ ] NO  LOCATION:   DATE INSERTED:  REMOVE:  [ ] YES [ ] NO  EXPLAIN:    PMH/Social Hx/Fam Hx -reviewed admission note, no change since admission  PAST MEDICAL & SURGICAL HISTORY:  Arthritis  HTN (hypertension)    Heart faliure: acute [ ] chronic [ ] acute or chronic [ ] diastolic [ ] systolic [ ] combied systolic and diastolic[ ]  MARLEN: ATN[ ] renal medullary necrosis [ ] CKD I [ ]CKDII [ ]CKD III [ ]CKD IV [ ]CKD V [ ]Other pathological lesions [ ]  Abdominal Nutrition Status: malnutrition [ ] cachexia [ ] morbid obesity/BMI=40 [ ] Supplement ordered [___________]     T(C): 35.3 (18 @ 18:38), Max: 37.3 (18 @ 01:48)  HR: 53 (18 @ 00:00)  BP: 104/49 (18 @ 00:00)  BP(mean): 63 (18 @ 00:00)  ABP: --  ABP(mean): --  RR: 20 (18 @ 00:00)  SpO2: 100% (18 @ 00:00)  Wt(kg): --    ABG - ( 2018 18:46 )  pH, Arterial: 7.49  pH, Blood: x     /  pCO2: 29    /  pO2: 123   / HCO3: 22    / Base Excess: -0.5  /  SaO2: 99          Mode: AC/ CMV (Assist Control/ Continuous Mandatory Ventilation)  RR (machine): 12  TV (machine): 400  FiO2: 50  PEEP: 5  ITime: 1  MAP: 11  PIP: 21      PHYSICAL EXAM:    GENERAL: Awake , alert  HEAD:  Atraumatic, Normocephalic  EYES: EOMI,PERRLA, conjunctiva and sclera clear  ENMT: No tonsillar erythema, exudates, or enlargement;NECK: [ ]Supple, normal appearance, [ ]No JVD; [ ]Normal thyroid; [ ]Trachea midline  NERVOUS SYSTEM:  Alert & Oriented X3, Good concentration  CHEST/LUNG: No chest deformity; Normal percussion bilaterally  HEART: Regular rate and rhythm; No murmurs, rubs, or gallops  ABDOMEN: Mild distension with generalized tenderness  EXTREMITIES: 2+ Peripheral Pulses,No clubbing, cyanosis, or edema Bilat lower extremity edema  LYMPH: No lymphadenopathy noted  SKIN: No rashes or lesions; [ ]Good capillary refill      LABS:  CBC Full  -  ( 2018 18:51 )  WBC Count : 5.2 K/uL  Hemoglobin : 11.8 g/dL  Hematocrit : 36.5 %  Platelet Count - Automated : 174 K/uL  Mean Cell Volume : 94.9 fl  Mean Cell Hemoglobin : 30.8 pg  Mean Cell Hemoglobin Concentration : 32.5 gm/dL  Auto Neutrophil # : 4.4 K/uL  Auto Lymphocyte # : 0.3 K/uL  Auto Monocyte # : 0.5 K/uL  Auto Eosinophil # : 0.0 K/uL  Auto Basophil # : 0.0 K/uL  Auto Neutrophil % : 84.7 %  Auto Lymphocyte % : 5.0 %  Auto Monocyte % : 9.5 %  Auto Eosinophil % : 0.2 %  Auto Basophil % : 0.6 %        143  |  109<H>  |  19<H>  ----------------------------<  110<H>  3.3<L>   |  23  |  0.74    Ca    7.3<L>      2018 18:51  Phos  2.8       Mg     1.5     11-16    TPro  8.1  /  Alb  3.5  /  TBili  1.3<H>  /  DBili  x   /  AST  55<H>  /  ALT  29  /  AlkPhos  136<H>  11-16    PTT - ( 2018 02:56 )  PTT:26.4 sec  Urinalysis Basic - ( 2018 05:10 )    Color: Yellow / Appearance: Clear / S.010 / pH: x  Gluc: x / Ketone: Trace  / Bili: Negative / Urobili: Negative   Blood: x / Protein: 100 / Nitrite: Negative   Leuk Esterase: Small / RBC: 2-5 /HPF / WBC 3-5 /HPF   Sq Epi: x / Non Sq Epi: Few /HPF / Bacteria: Trace /HPF          RADIOLOGY & ADDITIONAL STUDIES REVIEWED:      [ ]GOALS OF CARE DISCUSSION WITH PATIENT/FAMILY/PROXY:    CRITICAL CARE TIME SPENT: 35 minutes INTERVAL HPI/OVERNIGHT EVENTS: No acute overnight events except pt had sinus bradycardia.      ANTIBIOTICS: Zosyn                DATE STARTED:     Antimicrobial:  piperacillin/tazobactam IVPB. 3.375 Gram(s) IV Intermittent every 8 hours    Cardiovascular:    Pulmonary:    Hematalogic:  heparin  Injectable 5000 Unit(s) SubCutaneous every 8 hours    Other:  chlorhexidine 4% Liquid 1 Application(s) Topical two times a day  fentaNYL   Infusion 0.5 MICROgram(s)/kG/Hr IV Continuous <Continuous>      Drug Dosing Weight  Height (cm): 167.64 (2018 18:38)  Weight (kg): 58.4 (2018 18:38)  BMI (kg/m2): 20.8 (2018 18:38)  BSA (m2): 1.66 (2018 18:38)    N    PMH/Social Hx/Fam Hx -reviewed admission note, no change since admission  PAST MEDICAL & SURGICAL HISTORY:  Arthritis  HTN (hypertension)    Heart faliure: acute [ ] chronic [ ] acute or chronic [ ] diastolic [ ] systolic [ ] combied systolic and diastolic[ ]  MARLEN: ATN[ ] renal medullary necrosis [ ] CKD I [ ]CKDII [ ]CKD III [ ]CKD IV [ ]CKD V [ ]Other pathological lesions [ ]  Abdominal Nutrition Status: malnutrition [ ] cachexia [ ] morbid obesity/BMI=40 [ ] Supplement ordered [___________]     T(C): 35.3 (18 @ 18:38), Max: 37.3 (18 @ 01:48)  HR: 53 (18 @ 00:00)  BP: 104/49 (18 @ 00:00)  BP(mean): 63 (18 @ 00:00)  ABP: --  ABP(mean): --  RR: 20 (18 @ 00:00)  SpO2: 100% (18 @ 00:00)  Wt(kg): --    ABG - ( 2018 18:46 )  pH, Arterial: 7.49  pH, Blood: x     /  pCO2: 29    /  pO2: 123   / HCO3: 22    / Base Excess: -0.5  /  SaO2: 99          Mode: AC/ CMV (Assist Control/ Continuous Mandatory Ventilation)  RR (machine): 12  TV (machine): 400  FiO2: 50  PEEP: 5  ITime: 1  MAP: 11  PIP: 21      PHYSICAL EXAM:    GENERAL: Sedated  HEAD:  Atraumatic, Normocephalic  EYES: EOMI,PERRLA, conjunctiva and sclera clear  ENMT: No tonsillar erythema, exudates, or enlargement;NECK: [ ]Supple, normal appearance, [ ]No JVD; [ ]Normal thyroid; [ ]Trachea midline  NERVOUS SYSTEM:  Sedated  CHEST/LUNG: No chest deformity; Normal percussion bilaterally  HEART: Regular rate and rhythm; No murmurs, rubs, or gallops  ABDOMEN: Mild distension with generalized tenderness  EXTREMITIES: 2+ Peripheral Pulses,No clubbing, cyanosis, or edema Bilat lower extremity edema  LYMPH: No lymphadenopathy noted  SKIN: No rashes or lesions; [ ]Good capillary refill      LABS:  CBC Full  -  ( 2018 18:51 )  WBC Count : 5.2 K/uL  Hemoglobin : 11.8 g/dL  Hematocrit : 36.5 %  Platelet Count - Automated : 174 K/uL  Mean Cell Volume : 94.9 fl  Mean Cell Hemoglobin : 30.8 pg  Mean Cell Hemoglobin Concentration : 32.5 gm/dL  Auto Neutrophil # : 4.4 K/uL  Auto Lymphocyte # : 0.3 K/uL  Auto Monocyte # : 0.5 K/uL  Auto Eosinophil # : 0.0 K/uL  Auto Basophil # : 0.0 K/uL  Auto Neutrophil % : 84.7 %  Auto Lymphocyte % : 5.0 %  Auto Monocyte % : 9.5 %  Auto Eosinophil % : 0.2 %  Auto Basophil % : 0.6 %        143  |  109<H>  |  19<H>  ----------------------------<  110<H>  3.3<L>   |  23  |  0.74    Ca    7.3<L>      2018 18:51  Phos  2.8       Mg     1.5         TPro  8.1  /  Alb  3.5  /  TBili  1.3<H>  /  DBili  x   /  AST  55<H>  /  ALT  29  /  AlkPhos  136<H>      PTT - ( 2018 02:56 )  PTT:26.4 sec  Urinalysis Basic - ( 2018 05:10 )    Color: Yellow / Appearance: Clear / S.010 / pH: x  Gluc: x / Ketone: Trace  / Bili: Negative / Urobili: Negative   Blood: x / Protein: 100 / Nitrite: Negative   Leuk Esterase: Small / RBC: 2-5 /HPF / WBC 3-5 /HPF   Sq Epi: x / Non Sq Epi: Few /HPF / Bacteria: Trace /HPF          RADIOLOGY & ADDITIONAL STUDIES REVIEWED:      [ ]GOALS OF CARE DISCUSSION WITH PATIENT/FAMILY/PROXY:    CRITICAL CARE TIME SPENT: 35 minutes INTERVAL HPI/OVERNIGHT EVENTS: No acute overnight events except pt had sinus bradycardia.    pt currently intubated, alert, responsive, intermittently following commands in Turkish    ANTIBIOTICS: Zosyn                DATE STARTED:     Antimicrobial:  piperacillin/tazobactam IVPB. 3.375 Gram(s) IV Intermittent every 8 hours    Cardiovascular:    Pulmonary:    Hematalogic:  heparin  Injectable 5000 Unit(s) SubCutaneous every 8 hours    Other:  chlorhexidine 4% Liquid 1 Application(s) Topical two times a day  fentaNYL   Infusion 0.5 MICROgram(s)/kG/Hr IV Continuous <Continuous>      Drug Dosing Weight  Height (cm): 167.64 (2018 18:38)  Weight (kg): 58.4 (2018 18:38)  BMI (kg/m2): 20.8 (2018 18:38)  BSA (m2): 1.66 (2018 18:38)    N    PMH/Social Hx/Fam Hx -reviewed admission note, no change since admission  PAST MEDICAL & SURGICAL HISTORY:  Arthritis  HTN (hypertension)    Heart faliure: acute [ ] chronic [ ] acute or chronic [ ] diastolic [ ] systolic [ ] combied systolic and diastolic[ ]  MARLEN: ATN[ ] renal medullary necrosis [ ] CKD I [ ]CKDII [ ]CKD III [ ]CKD IV [ ]CKD V [ ]Other pathological lesions [ ]  Abdominal Nutrition Status: malnutrition [ ] cachexia [ ] morbid obesity/BMI=40 [ ] Supplement ordered [___________]     T(C): 35.3 (18 @ 18:38), Max: 37.3 (18 @ 01:48)  HR: 53 (18 @ 00:00)  BP: 104/49 (18 @ 00:00)  BP(mean): 63 (18 @ 00:00)  ABP: --  ABP(mean): --  RR: 20 (18 @ 00:00)  SpO2: 100% (18 @ 00:00)  Wt(kg): --    ABG - ( 2018 18:46 )  pH, Arterial: 7.49  pH, Blood: x     /  pCO2: 29    /  pO2: 123   / HCO3: 22    / Base Excess: -0.5  /  SaO2: 99          Mode: AC/ CMV (Assist Control/ Continuous Mandatory Ventilation)  RR (machine): 12  TV (machine): 400  FiO2: 50  PEEP: 5  ITime: 1  MAP: 11  PIP: 21      PHYSICAL EXAM:    GENERAL: Sedated  HEAD:  Atraumatic, Normocephalic  EYES: PERRLA, conjunctiva and sclera clear  ENMT: No tonsillar erythema, exudates, or enlargement;  NECK: Supple, normal appearance, No JVD;   NERVOUS SYSTEM:  Sedated but off sedation moves all 4 ext, not following commands.   CHEST/LUNG: No chest deformity; Normal percussion bilaterally  HEART: Regular rate and rhythm; No murmurs, rubs, or gallops  ABDOMEN: Mild distension with generalized tenderness  EXTREMITIES: 2+ Peripheral Pulses,No clubbing, cyanosis, or edema Bilat lower extremity edema  LYMPH: No lymphadenopathy noted  SKIN: No rashes or lesions; [ ]Good capillary refill      LABS:  CBC Full  -  ( 2018 18:51 )  WBC Count : 5.2 K/uL  Hemoglobin : 11.8 g/dL  Hematocrit : 36.5 %  Platelet Count - Automated : 174 K/uL  Mean Cell Volume : 94.9 fl  Mean Cell Hemoglobin : 30.8 pg  Mean Cell Hemoglobin Concentration : 32.5 gm/dL  Auto Neutrophil # : 4.4 K/uL  Auto Lymphocyte # : 0.3 K/uL  Auto Monocyte # : 0.5 K/uL  Auto Eosinophil # : 0.0 K/uL  Auto Basophil # : 0.0 K/uL  Auto Neutrophil % : 84.7 %  Auto Lymphocyte % : 5.0 %  Auto Monocyte % : 9.5 %  Auto Eosinophil % : 0.2 %  Auto Basophil % : 0.6 %        143  |  109<H>  |  19<H>  ----------------------------<  110<H>  3.3<L>   |  23  |  0.74    Ca    7.3<L>      2018 18:51  Phos  2.8       Mg     1.5         TPro  8.1  /  Alb  3.5  /  TBili  1.3<H>  /  DBili  x   /  AST  55<H>  /  ALT  29  /  AlkPhos  136<H>      PTT - ( 2018 02:56 )  PTT:26.4 sec  Urinalysis Basic - ( 2018 05:10 )    Color: Yellow / Appearance: Clear / S.010 / pH: x  Gluc: x / Ketone: Trace  / Bili: Negative / Urobili: Negative   Blood: x / Protein: 100 / Nitrite: Negative   Leuk Esterase: Small / RBC: 2-5 /HPF / WBC 3-5 /HPF   Sq Epi: x / Non Sq Epi: Few /HPF / Bacteria: Trace /HPF          RADIOLOGY & ADDITIONAL STUDIES REVIEWED:      [ ]GOALS OF CARE DISCUSSION WITH PATIENT/FAMILY/PROXY:    CRITICAL CARE TIME SPENT: 35 minutes

## 2018-11-17 NOTE — PROGRESS NOTE ADULT - SUBJECTIVE AND OBJECTIVE BOX
89F POD#1 s/p ex lap, MARIA ISABEL.     Overnight, patient required pressors for hypotension. Patient still intubated and was agitated so restraints placed. Currently, patient sedated on propofol. On 0.1mcg/kg/min Levophed.     ICU Vital Signs Last 24 Hrs  T(C): 36.7 (17 Nov 2018 07:15), Max: 36.7 (17 Nov 2018 07:15)  T(F): 98.1 (17 Nov 2018 07:15), Max: 98.1 (17 Nov 2018 07:15)  HR: 65 (17 Nov 2018 11:00) (49 - 96)  BP: 131/98 (17 Nov 2018 11:00) (70/50 - 183/85)  BP(mean): 103 (17 Nov 2018 11:00) (44 - 110)  ABP: --  ABP(mean): --  RR: 25 (17 Nov 2018 11:00) (15 - 27)  SpO2: 100% (17 Nov 2018 11:00) (95% - 100%)    PE:   intubated, sedated on propofol   CTAB, vented on AC/12/400/50%/5  abd soft, ND, dressing c/d/i, decreased BS  NGT in place with bilious output  Allen in place with clear yellow urine   SCDs in place, np pedal edema    I/Os:  NGT: 175mL/12hrs/bilious  UOP: 675mL/12hrs/clear yellow                        12.6   7.1   )-----------( 191      ( 17 Nov 2018 06:05 )             38.5     11-17    142  |  108  |  20<H>  ----------------------------<  122<H>  3.4<L>   |  22  |  0.94    Ca    7.6<L>      17 Nov 2018 06:05  Phos  2.8     11-17  Mg     2.1     11-17    TPro  8.1  /  Alb  3.5  /  TBili  1.3<H>  /  DBili  x   /  AST  55<H>  /  ALT  29  /  AlkPhos  136<H>  11-16 89F POD#1 s/p ex lap, MARIA ISABEL.     Overnight, patient required pressors for hypotension. Patient still intubated and was agitated so restraints placed. Currently, patient sedated on propofol. On 0.1mcg/kg/min Levophed.     ICU Vital Signs Last 24 Hrs  T(C): 36.7 (17 Nov 2018 07:15), Max: 36.7 (17 Nov 2018 07:15)  T(F): 98.1 (17 Nov 2018 07:15), Max: 98.1 (17 Nov 2018 07:15)  HR: 65 (17 Nov 2018 11:00) (49 - 96)  BP: 131/98 (17 Nov 2018 11:00) (70/50 - 183/85)  BP(mean): 103 (17 Nov 2018 11:00) (44 - 110)  ABP: --  ABP(mean): --  RR: 25 (17 Nov 2018 11:00) (15 - 27)  SpO2: 100% (17 Nov 2018 11:00) (95% - 100%)    PE:   intubated, sedated on propofol   CTAB, vented on AC/12/400/50%/5  abd soft, ND, dressing c/d/i, decreased BS  NGT in place with bilious output  Allen in place with clear yellow urine   SCDs in place, np pedal edema    I/Os:  NGT: 175mL/12hrs/bilious  UOP: 675mL/12hrs/clear yellow                        12.6   7.1   )-----------( 191      ( 17 Nov 2018 06:05 )             38.5     11-17    142  |  108  |  20<H>  ----------------------------<  122<H>  3.4<L>   |  22  |  0.94    Ca    7.6<L>      17 Nov 2018 06:05  Phos  2.8     11-17  Mg     2.1     11-17    TPro  8.1  /  Alb  3.5  /  TBili  1.3<H>  /  DBili  x   /  AST  55<H>  /  ALT  29  /  AlkPhos  136<H>  11-16    MEDICATIONS  (STANDING):  chlorhexidine 4% Liquid 1 Application(s) Topical two times a day  dexmedetomidine Infusion 0.5 MICROgram(s)/kG/Hr (7.3 mL/Hr) IV Continuous <Continuous>  fentaNYL   Infusion 2 MICROgram(s)/kG/Hr (11.68 mL/Hr) IV Continuous <Continuous>  heparin  Injectable 5000 Unit(s) SubCutaneous every 8 hours  norepinephrine Infusion 0.05 MICROgram(s)/kG/Min (2.737 mL/Hr) IV Continuous <Continuous>  pantoprazole  Injectable 40 milliGRAM(s) IV Push daily  piperacillin/tazobactam IVPB. 3.375 Gram(s) IV Intermittent every 8 hours  propofol Infusion 5 MICROgram(s)/kG/Min (1.752 mL/Hr) IV Continuous <Continuous>    MEDICATIONS  (PRN):

## 2018-11-17 NOTE — PROGRESS NOTE ADULT - SUBJECTIVE AND OBJECTIVE BOX
Ladarius Brown MD  Interventional Cardiology  [place] Office : [phone] [place] [address] [address] [address]  Tel:   [place] Office : [address] [facility] [place] [address]  Tel: [phone]  Cell : [phone]    Subjective : Pt lying in bed agitated  	  MEDICATIONS:  heparin  Injectable 5000 Unit(s) SubCutaneous every 8 hours  norepinephrine Infusion 0.05 MICROgram(s)/kG/Min IV Continuous <Continuous>    piperacillin/tazobactam IVPB. 3.375 Gram(s) IV Intermittent every 8 hours      dexmedetomidine Infusion 0.5 MICROgram(s)/kG/Hr IV Continuous <Continuous>  fentaNYL   Infusion 2 MICROgram(s)/kG/Hr IV Continuous <Continuous>    pantoprazole  Injectable 40 milliGRAM(s) IV Push daily      chlorhexidine 4% Liquid 1 Application(s) Topical two times a day  sodium chloride 0.9% Bolus 500 milliLiter(s) IV Bolus once      PHYSICAL EXAM:  T(C): 36 (11-17-18 @ 15:39), Max: 36.7 (11-17-18 @ 07:15)  HR: 85 (11-17-18 @ 17:00) (49 - 96)  BP: 149/75 (11-17-18 @ 17:00) (70/50 - 183/85)  RR: 23 (11-17-18 @ 17:00) (16 - 27)  SpO2: 85% (11-17-18 @ 17:00) (85% - 100%)  Wt(kg): --  I&O's Summary    16 Nov 2018 07:01  -  17 Nov 2018 07:00  --------------------------------------------------------  IN: 1720.6 mL / OUT: 850 mL / NET: 870.6 mL    17 Nov 2018 07:01  -  17 Nov 2018 18:36  --------------------------------------------------------  IN: 522.8 mL / OUT: 255 mL / NET: 267.8 mL      Height (cm): 167.64 (11-16 @ 18:38)  Weight (kg): 58.4 (11-16 @ 18:38)  BMI (kg/m2): 20.8 (11-16 @ 18:38)  BSA (m2): 1.66 (11-16 @ 18:38)    Appearance: agitated , fighting vent 	  HEENT:   Normal oral mucos	  Cardiovascular: Normal S1 S2, No JVD, systolic murmur +, No edema  Respiratory: Lungs clear to auscultation	  Gastrointestinal:  Soft, Non-tender, + BS	  Extremities: Normal range of motion, No clubbing, cyanosis or edema    Troponin I, Serum: 0.186 ng/mL (11-17 @ 06:05)  Troponin I, Serum: 0.191 ng/mL (11-17 @ 00:12)                                  12.6   7.1   )-----------( 191      ( 17 Nov 2018 06:05 )             38.5     11-17    142  |  108  |  20<H>  ----------------------------<  122<H>  3.4<L>   |  22  |  0.94    Ca    7.6<L>      17 Nov 2018 06:05  Phos  2.8     11-17  Mg     2.1     11-17    TPro  8.1  /  Alb  3.5  /  TBili  1.3<H>  /  DBili  x   /  AST  55<H>  /  ALT  29  /  AlkPhos  136<H>  11-16    proBNP:   Lipid Profile:   HgA1c:   TSH:

## 2018-11-17 NOTE — PROGRESS NOTE ADULT - ASSESSMENT
EKG: Slow Afib 60, RBBB, Q II, III, AvF , repeat EKG unchanged    Bedside echo: Inferior , inferoseptal hypokinesis EF 35-40%, Mod MR, Mild AI, Moderate TR, RV not visualized, IVC unable to be assessed given surgical dressing     Assessment and Plan     1) NSTEMI: Troponin plateaued , Inf, inf-lat WMA, EF 40,  not a candidate for cath given recent surgery, Npo , Rectal Asa and Heparin  when ok form a surgical stand point. Hold beta blockers     2) Afib : Bradycardic, avoid av franca blockers, Heparin drip when ok with surgery    3) Hypotension : now on Levo , monitor u/o if <50/hr can give gentle hydration     4) SBO: s/p surgey management as per surgical team

## 2018-11-17 NOTE — PROGRESS NOTE ADULT - ASSESSMENT
89 y/o female with sig surgical history with onset abd pain, n/v .CT concerning for closed loop bowel obstruction  s/p Lysis of adhesion of small bowel obstruction.

## 2018-11-18 LAB
ALBUMIN SERPL ELPH-MCNC: 2.1 G/DL — LOW (ref 3.5–5)
ALP SERPL-CCNC: 72 U/L — SIGNIFICANT CHANGE UP (ref 40–120)
ALT FLD-CCNC: 15 U/L DA — SIGNIFICANT CHANGE UP (ref 10–60)
ANION GAP SERPL CALC-SCNC: 12 MMOL/L — SIGNIFICANT CHANGE UP (ref 5–17)
AST SERPL-CCNC: 22 U/L — SIGNIFICANT CHANGE UP (ref 10–40)
BASE EXCESS BLDA CALC-SCNC: -0.5 MMOL/L — SIGNIFICANT CHANGE UP (ref -2–2)
BILIRUB SERPL-MCNC: 1.1 MG/DL — SIGNIFICANT CHANGE UP (ref 0.2–1.2)
BLOOD GAS COMMENTS ARTERIAL: SIGNIFICANT CHANGE UP
BUN SERPL-MCNC: 23 MG/DL — HIGH (ref 7–18)
CALCIUM SERPL-MCNC: 8.1 MG/DL — LOW (ref 8.4–10.5)
CHLORIDE SERPL-SCNC: 110 MMOL/L — HIGH (ref 96–108)
CO2 SERPL-SCNC: 20 MMOL/L — LOW (ref 22–31)
CREAT SERPL-MCNC: 0.99 MG/DL — SIGNIFICANT CHANGE UP (ref 0.5–1.3)
GLUCOSE BLDC GLUCOMTR-MCNC: 106 MG/DL — HIGH (ref 70–99)
GLUCOSE SERPL-MCNC: 100 MG/DL — HIGH (ref 70–99)
HCO3 BLDA-SCNC: 21 MMOL/L — LOW (ref 23–27)
HCT VFR BLD CALC: 36.2 % — SIGNIFICANT CHANGE UP (ref 34.5–45)
HGB BLD-MCNC: 11.8 G/DL — SIGNIFICANT CHANGE UP (ref 11.5–15.5)
HOROWITZ INDEX BLDA+IHG-RTO: 40 — SIGNIFICANT CHANGE UP
MAGNESIUM SERPL-MCNC: 2.2 MG/DL — SIGNIFICANT CHANGE UP (ref 1.6–2.6)
MCHC RBC-ENTMCNC: 30.8 PG — SIGNIFICANT CHANGE UP (ref 27–34)
MCHC RBC-ENTMCNC: 32.7 GM/DL — SIGNIFICANT CHANGE UP (ref 32–36)
MCV RBC AUTO: 94.2 FL — SIGNIFICANT CHANGE UP (ref 80–100)
PCO2 BLDA: 26 MMHG — LOW (ref 32–46)
PH BLDA: 7.52 — HIGH (ref 7.35–7.45)
PHOSPHATE SERPL-MCNC: 2.6 MG/DL — SIGNIFICANT CHANGE UP (ref 2.5–4.5)
PLATELET # BLD AUTO: 164 K/UL — SIGNIFICANT CHANGE UP (ref 150–400)
PO2 BLDA: 169 MMHG — HIGH (ref 74–108)
POTASSIUM SERPL-MCNC: 3.4 MMOL/L — LOW (ref 3.5–5.3)
POTASSIUM SERPL-SCNC: 3.4 MMOL/L — LOW (ref 3.5–5.3)
PROT SERPL-MCNC: 5.6 G/DL — LOW (ref 6–8.3)
RBC # BLD: 3.84 M/UL — SIGNIFICANT CHANGE UP (ref 3.8–5.2)
RBC # FLD: 13.8 % — SIGNIFICANT CHANGE UP (ref 10.3–14.5)
SAO2 % BLDA: >99 % — SIGNIFICANT CHANGE UP (ref 92–96)
SODIUM SERPL-SCNC: 142 MMOL/L — SIGNIFICANT CHANGE UP (ref 135–145)
TROPONIN I SERPL-MCNC: 0.13 NG/ML — HIGH (ref 0–0.04)
WBC # BLD: 9.8 K/UL — SIGNIFICANT CHANGE UP (ref 3.8–10.5)
WBC # FLD AUTO: 9.8 K/UL — SIGNIFICANT CHANGE UP (ref 3.8–10.5)

## 2018-11-18 PROCEDURE — 71045 X-RAY EXAM CHEST 1 VIEW: CPT | Mod: 26

## 2018-11-18 RX ORDER — POTASSIUM CHLORIDE 20 MEQ
10 PACKET (EA) ORAL
Qty: 0 | Refills: 0 | Status: COMPLETED | OUTPATIENT
Start: 2018-11-18 | End: 2018-11-18

## 2018-11-18 RX ORDER — ASPIRIN/CALCIUM CARB/MAGNESIUM 324 MG
600 TABLET ORAL DAILY
Qty: 0 | Refills: 0 | Status: DISCONTINUED | OUTPATIENT
Start: 2018-11-18 | End: 2018-11-19

## 2018-11-18 RX ORDER — MORPHINE SULFATE 50 MG/1
2 CAPSULE, EXTENDED RELEASE ORAL EVERY 4 HOURS
Qty: 0 | Refills: 0 | Status: DISCONTINUED | OUTPATIENT
Start: 2018-11-18 | End: 2018-11-20

## 2018-11-18 RX ORDER — PROPOFOL 10 MG/ML
5 INJECTION, EMULSION INTRAVENOUS
Qty: 500 | Refills: 0 | Status: DISCONTINUED | OUTPATIENT
Start: 2018-11-18 | End: 2018-11-19

## 2018-11-18 RX ORDER — FUROSEMIDE 40 MG
40 TABLET ORAL ONCE
Qty: 0 | Refills: 0 | Status: COMPLETED | OUTPATIENT
Start: 2018-11-18 | End: 2018-11-18

## 2018-11-18 RX ADMIN — FENTANYL CITRATE 11.68 MICROGRAM(S)/KG/HR: 50 INJECTION INTRAVENOUS at 05:30

## 2018-11-18 RX ADMIN — Medication 100 MILLIEQUIVALENT(S): at 13:45

## 2018-11-18 RX ADMIN — HEPARIN SODIUM 5000 UNIT(S): 5000 INJECTION INTRAVENOUS; SUBCUTANEOUS at 05:56

## 2018-11-18 RX ADMIN — Medication 40 MILLIGRAM(S): at 12:20

## 2018-11-18 RX ADMIN — CHLORHEXIDINE GLUCONATE 1 APPLICATION(S): 213 SOLUTION TOPICAL at 17:53

## 2018-11-18 RX ADMIN — PIPERACILLIN AND TAZOBACTAM 25 GRAM(S): 4; .5 INJECTION, POWDER, LYOPHILIZED, FOR SOLUTION INTRAVENOUS at 05:55

## 2018-11-18 RX ADMIN — HEPARIN SODIUM 5000 UNIT(S): 5000 INJECTION INTRAVENOUS; SUBCUTANEOUS at 14:35

## 2018-11-18 RX ADMIN — Medication 100 MILLIEQUIVALENT(S): at 13:00

## 2018-11-18 RX ADMIN — PANTOPRAZOLE SODIUM 40 MILLIGRAM(S): 20 TABLET, DELAYED RELEASE ORAL at 12:40

## 2018-11-18 RX ADMIN — HEPARIN SODIUM 5000 UNIT(S): 5000 INJECTION INTRAVENOUS; SUBCUTANEOUS at 21:04

## 2018-11-18 RX ADMIN — Medication 100 MILLIEQUIVALENT(S): at 12:42

## 2018-11-18 RX ADMIN — CHLORHEXIDINE GLUCONATE 1 APPLICATION(S): 213 SOLUTION TOPICAL at 05:03

## 2018-11-18 RX ADMIN — DEXMEDETOMIDINE HYDROCHLORIDE IN 0.9% SODIUM CHLORIDE 7.3 MICROGRAM(S)/KG/HR: 4 INJECTION INTRAVENOUS at 00:30

## 2018-11-18 RX ADMIN — PIPERACILLIN AND TAZOBACTAM 25 GRAM(S): 4; .5 INJECTION, POWDER, LYOPHILIZED, FOR SOLUTION INTRAVENOUS at 21:04

## 2018-11-18 RX ADMIN — PIPERACILLIN AND TAZOBACTAM 25 GRAM(S): 4; .5 INJECTION, POWDER, LYOPHILIZED, FOR SOLUTION INTRAVENOUS at 14:29

## 2018-11-18 NOTE — CHART NOTE - NSCHARTNOTEFT_GEN_A_CORE
Upon Nutritional Assessment by the Registered Dietitian your patient was determined to meet criteria / has evidence of the following diagnosis/diagnoses:          [ ]  Mild Protein Calorie Malnutrition        [ x]  Moderate Protein Calorie Malnutrition        [ ] Severe Protein Calorie Malnutrition        [ ] Unspecified Protein Calorie Malnutrition        [ ] Underweight / BMI <19        [ ] Morbid Obesity / BMI > 40      Findings as based on:  •  Comprehensive nutrition assessment and consultation  •  Calorie counts (nutrient intake analysis)  •  Food acceptance and intake status from observations by staff  •  Follow up  •  Patient education  •  Intervention secondary to interdisciplinary rounds  •   concerns      Treatment:    The following diet has been recommended:      PROVIDER Section:     By signing this assessment you are acknowledging and agree with the diagnosis/diagnoses assigned by the Registered Dietitian    Comments: patient may be extubated tomorrow per RN, advance diet as per surgical decision, if patient cannot be fed by mouth consider nutrition support

## 2018-11-18 NOTE — DIETITIAN INITIAL EVALUATION ADULT. - MD RECOMMEND
patient may be extubated tomorrow per RN, advance diet as per surgical decision , if patient cannot be fed by mouth, consider nutrition support

## 2018-11-18 NOTE — PROGRESS NOTE ADULT - ASSESSMENT
89 y/o female with sig surgical history with onset abd pain, n/v .CT concerning for closed loop bowel obstruction  s/p Lysis of adhesion of small bowel obstruction. 89 F PMHx HTN and a significant PSH , Open cholecystectomy, and Hysterectomy presents to ED c/o constant burning abdominal pain - admitted to the ICU for post OP monitoring for exploratory laparotomy of SBO 2/2 adhesions

## 2018-11-18 NOTE — PROGRESS NOTE ADULT - SUBJECTIVE AND OBJECTIVE BOX
89F POD #2 s/p ex lap, MARIA ISABEL    No acute events overnight. Weaned off pressors. Patient still intubated with restraints.     ICU Vital Signs Last 24 Hrs  T(C): 36.7 (18 Nov 2018 08:00), Max: 36.7 (18 Nov 2018 08:00)  T(F): 98 (18 Nov 2018 08:00), Max: 98 (18 Nov 2018 08:00)  HR: 73 (18 Nov 2018 09:00) (54 - 106)  BP: 92/49 (18 Nov 2018 09:00) (90/50 - 160/62)  BP(mean): 60 (18 Nov 2018 09:00) (56 - 110)  ABP: --  ABP(mean): --  RR: 21 (18 Nov 2018 09:00) (17 - 27)  SpO2: 96% (18 Nov 2018 09:00) (85% - 100%)    PE:   intubated, on precedex  CTAB, vented on AC/12/400/40%/5  abd soft, ND, dressing c/d/i  NGT in place with bilious output  Allen in place with clear yellow urine   SCDs in place, no pedal edema    I/Os:  NGT: 50mL/12hrs/bilious  UOP: 600mL/12hrs/clear yellow                          11.8   9.8   )-----------( 164      ( 18 Nov 2018 05:48 )             36.2     11-17    142  |  108  |  20<H>  ----------------------------<  122<H>  3.4<L>   |  22  |  0.94    Ca    7.6<L>      17 Nov 2018 06:05  Phos  2.8     11-17  Mg     2.1     11-17      MEDICATIONS  (STANDING):  chlorhexidine 4% Liquid 1 Application(s) Topical two times a day  dexmedetomidine Infusion 0.5 MICROgram(s)/kG/Hr (7.3 mL/Hr) IV Continuous <Continuous>  fentaNYL   Infusion 2 MICROgram(s)/kG/Hr (11.68 mL/Hr) IV Continuous <Continuous>  heparin  Injectable 5000 Unit(s) SubCutaneous every 8 hours  norepinephrine Infusion 0.05 MICROgram(s)/kG/Min (2.737 mL/Hr) IV Continuous <Continuous>  pantoprazole  Injectable 40 milliGRAM(s) IV Push daily  piperacillin/tazobactam IVPB. 3.375 Gram(s) IV Intermittent every 8 hours    MEDICATIONS  (PRN):

## 2018-11-18 NOTE — PROGRESS NOTE ADULT - SUBJECTIVE AND OBJECTIVE BOX
INTERVAL HPI/OVERNIGHT EVENTS:       Antimicrobial:  piperacillin/tazobactam IVPB. 3.375 Gram(s) IV Intermittent every 8 hours    Cardiovascular:  furosemide   Injectable 40 milliGRAM(s) IV Push once  norepinephrine Infusion 0.05 MICROgram(s)/kG/Min IV Continuous <Continuous>    Pulmonary:    Hematalogic:  heparin  Injectable 5000 Unit(s) SubCutaneous every 8 hours    Other:  chlorhexidine 4% Liquid 1 Application(s) Topical two times a day  dexmedetomidine Infusion 0.5 MICROgram(s)/kG/Hr IV Continuous <Continuous>  morphine  - Injectable 2 milliGRAM(s) IV Push every 4 hours PRN  pantoprazole  Injectable 40 milliGRAM(s) IV Push daily  potassium chloride  10 mEq/100 mL IVPB 10 milliEquivalent(s) IV Intermittent every 1 hour      Drug Dosing Weight  Height (cm): 167.64 (16 Nov 2018 18:38)  Weight (kg): 58.4 (16 Nov 2018 18:38)  BMI (kg/m2): 20.8 (16 Nov 2018 18:38)  BSA (m2): 1.66 (16 Nov 2018 18:38)    CENTRAL LINE: [x ] YES [ ] NO  LOCATION:   DATE INSERTED:    MESA: [x ] YES [ ] NO    DATE INSERTED:    A-LINE:  [ ] YES [ x] NO  LOCATION:   DATE INSERTED:    PMH/Social Hx/Fam Hx -reviewed admission note, no change since admission  PAST MEDICAL & SURGICAL HISTORY:  Arthritis  HTN (hypertension)      T(C): 36.7 (11-18-18 @ 08:00), Max: 36.7 (11-18-18 @ 08:00)  HR: 79 (11-18-18 @ 11:00)  BP: 111/68 (11-18-18 @ 11:00)  BP(mean): 78 (11-18-18 @ 11:00)  ABP: --  ABP(mean): --  RR: 23 (11-18-18 @ 11:00)  SpO2: 100% (11-18-18 @ 11:00)  Wt(kg): --    ABG - ( 18 Nov 2018 04:52 )  pH, Arterial: 7.52  pH, Blood: x     /  pCO2: 26    /  pO2: 169   / HCO3: 21    / Base Excess: -0.5  /  SaO2: >99                   11-17 @ 07:01  -  11-18 @ 07:00  --------------------------------------------------------  IN: 1401.6 mL / OUT: 650 mL / NET: 751.6 mL        Mode: AC/ CMV (Assist Control/ Continuous Mandatory Ventilation)  RR (machine): 12  TV (machine): 400  FiO2: 40  PEEP: 5  ITime: 1  MAP: 10  PIP: 20      PHYSICAL EXAM:    GENERAL: No signs of distress, comfortable  HEAD: Atraumatic, Normocephalic  EYES: EOMI, PERRLA  ENMT: No erythema, exudates, or enlargement, Moist mucous membranes +ETT  NECK: Supple, normal appearance, No JVD; [  ] central line (if applicable)  CHEST/LUNG: No chest deformity, fair bilateral air entry; No rales, rhonchi, wheezing; crackles  HEART: Regular rate and rhythm; No murmurs, rubs, or gallops;   ABDOMEN: Soft, Nontender, Nondistended; Bowel sounds present . no BS  mid line abd wound dressing  EXTREMITIES:  + Peripheral Pulses, No clubbing, cyanosis, or edema  NERVOUS SYSTEM: sedated  LYMPH: No lymphadenopathy noted  SKIN: No rashes or lesions; good turgor, warm, dry      LABS:  CBC Full  -  ( 18 Nov 2018 05:48 )  WBC Count : 9.8 K/uL  Hemoglobin : 11.8 g/dL  Hematocrit : 36.2 %  Platelet Count - Automated : 164 K/uL  Mean Cell Volume : 94.2 fl  Mean Cell Hemoglobin : 30.8 pg  Mean Cell Hemoglobin Concentration : 32.7 gm/dL  Auto Neutrophil # : x  Auto Lymphocyte # : x  Auto Monocyte # : x  Auto Eosinophil # : x  Auto Basophil # : x  Auto Neutrophil % : x  Auto Lymphocyte % : x  Auto Monocyte % : x  Auto Eosinophil % : x  Auto Basophil % : x    11-18    142  |  110<H>  |  23<H>  ----------------------------<  100<H>  3.4<L>   |  20<L>  |  0.99    Ca    8.1<L>      18 Nov 2018 05:48  Phos  2.6     11-18  Mg     2.2     11-18    TPro  5.6<L>  /  Alb  2.1<L>  /  TBili  1.1  /  DBili  x   /  AST  22  /  ALT  15  /  AlkPhos  72  11-18        Culture Results:   No growth (11-16 @ 09:44)      RADIOLOGY & ADDITIONAL STUDIES REVIEWED     CXR:< from: Xray Chest 1 View- PORTABLE-Routine (11.18.18 @ 09:44) >  INTERPRETATION:  CLINICAL INFORMATION: Intubated.    A single portable view of the chest was obtained.     Comparison: 11/17/2018.     The mediastinal cardiac silhouette is unremarkable. Lines and tubes   unchanged from the previous exam.    Limited by rotation. Obscuration of the left hemidiaphragm may be related   to atelectasis or effusion or overlying soft tissues in this rotated   patient. The lungs are otherwise clear.    No acute osseous finding.     IMPRESSION:    Noted rotated examination as above.      < end of copied text >    2DECHO:< from: Transthoracic Echocardiogram (11.17.18 @ 10:42) >  ------------------------------------------------------------------------  CONCLUSIONS:  1. Mitral annular calcification. Moderate mitral  regurgitation.  2. Normal trileaflet aortic valve.  3. Aortic Root: 3.3 cm.  4. Severe left atrial enlargement.  5. Severe concentric left ventricular hypertrophy.  6. Normal Left Ventricular Systolic Function,  (EF = 55%)  7. Grade III diastolic dysfunction.  8. Normal right atrium.  9. Normal right ventricular size and function.  10. RV systolic pressure is mildly increased at41 mm Hg.  11. There is mild tricuspid regurgitation.  12. There is mild pulmonic regurgitation.  13. Normal pericardium with no pericardial effusion.  14. Left pleural effusion.    < end of copied text >    IMPRESSION:  PAST MEDICAL & SURGICAL HISTORY:  Arthritis  HTN (hypertension)   p/w       IMP: This is a 89 yr old woman with prior mentioned medical condition presented abdominal burning, vomiting due to SBO. 11/16 EX-LAP with MARIA ISABEL. No bowel resection. Post op Resp Failure requiring vent support.  Post of new on set Afib. sedated. failed SBT/SAT  today. CXR show pul edema and + fluid balance      Plan:    CNS: change propofol to precedex. d/c fentanyl drip and start morphine 2 mg Q4    PULMONARY: continue vent support . lasix. keep fluid neg balalnce    CARDIAC: hemodynamic support. f/u with surgery regarding therapeutic hep for afib. asa supp    GI: NPO. PPI iv. keeep  NGT to suction.    RENAL: mesa, ivf, monitor out put . keep neg fluid balance    SKIN: wound care    MUSCULOSKELETAL: boots    ID: antibx , f/u cultures    HEME: monitor    DVT and GI Prophylaxis                  GOALS OF CARE DISCUSSION WITH PATIENT/FAMILY/PROXY/ 2 sons at bedside and icu team on rounds    CRITICAL CARE TIME SPENT: 35 minutes

## 2018-11-18 NOTE — PROGRESS NOTE ADULT - ASSESSMENT
89F POD#2 s/p ex lap, MARIA ISABEL stable  - wean to extubate  - dressing change on monday 11/19, can change top dressing if soiled  - cont NGT to low intermittent suction  - electrolyte repletion  - cont mesa, monitor UOP  - cont abx as per ID  - care as per ICU team

## 2018-11-18 NOTE — DIETITIAN INITIAL EVALUATION ADULT. - PERTINENT LABORATORY DATA
11-18 Na142 mmol/L Glu 100 mg/dL<H> K+ 3.4 mmol/L<L> Cr  0.99 mg/dL BUN 23 mg/dL<H> 11-18 Phos 2.6 mg/dL 11-18 Alb 2.1 g/dL<L>

## 2018-11-18 NOTE — PROGRESS NOTE ADULT - SUBJECTIVE AND OBJECTIVE BOX
SUBJECTIVE / OVERNIGHT EVENTS: pt intubated       MEDICATIONS  (STANDING):  aspirin Suppository 600 milliGRAM(s) Rectal daily  chlorhexidine 4% Liquid 1 Application(s) Topical two times a day  dexmedetomidine Infusion 0.5 MICROgram(s)/kG/Hr (7.3 mL/Hr) IV Continuous <Continuous>  heparin  Injectable 5000 Unit(s) SubCutaneous every 8 hours  norepinephrine Infusion 0.05 MICROgram(s)/kG/Min (2.737 mL/Hr) IV Continuous <Continuous>  pantoprazole  Injectable 40 milliGRAM(s) IV Push daily  piperacillin/tazobactam IVPB. 3.375 Gram(s) IV Intermittent every 8 hours  propofol Infusion 5 MICROgram(s)/kG/Min (1.752 mL/Hr) IV Continuous <Continuous>    MEDICATIONS  (PRN):  morphine  - Injectable 2 milliGRAM(s) IV Push every 4 hours PRN Severe Pain (7 - 10)      Vital Signs Last 24 Hrs  T(C): 36.4 (2018 20:51), Max: 36.7 (2018 08:00)  T(F): 97.5 (2018 20:51), Max: 98 (2018 08:00)  HR: 77 (2018 20:28) (54 - 106)  BP: 108/63 (2018 19:05) (84/41 - 160/62)  BP(mean): 74 (2018 19:05) (48 - 107)  RR: 17 (2018 19:05) (17 - 26)  SpO2: 100% (2018 20:28) (96% - 100%)  CAPILLARY BLOOD GLUCOSE      POCT Blood Glucose.: 106 mg/dL (2018 05:25)    I&O's Summary  +  2018 07:  -  2018 07:00  --------------------------------------------------------  IN: 1401.6 mL / OUT: 650 mL / NET: 751.6 mL    2018 07:  -  2018 21:57  --------------------------------------------------------  IN: 506.2 mL / OUT: 470 mL / NET: 36.2 mL          NECK: Supple, No JVD  CHEST/LUNG: dec breath sounds at base   HEART:  S1 , S2 +  ABDOMEN: soft, bs+, mild distension  EXTREMITIES:  trace edema    LABS:      142  |  110<H>  |  23<H>  ----------------------------<  100<H>  3.4<L>   |  20<L>  |  0.99    Ca    8.1<L>      2018 05:48  Phos  2.6       Mg     2.2         TPro  5.6<L>  /  Alb  2.1<L>  /  TBili  1.1  /  DBili      /  AST  22  /  ALT  15  /  AlkPhos  72      Creatinine Trend: 0.99 <--, 0.94 <--, 0.74 <--, 0.74 <--, 0.79 <--, 1.13 <--                        11.8   9.8   )-----------( 164      ( 2018 05:48 )             36.2     Urine Studies:  Urinalysis Basic - ( 2018 05:10 )    Color: Yellow / Appearance: Clear / S.010 / pH:   Gluc:  / Ketone: Trace  / Bili: Negative / Urobili: Negative   Blood:  / Protein: 100 / Nitrite: Negative   Leuk Esterase: Small / RBC: 2-5 /HPF / WBC 3-5 /HPF   Sq Epi:  / Non Sq Epi: Few /HPF / Bacteria: Trace /HPF        CARDIAC MARKERS ( 2018 05:48 )  0.134 ng/mL / x     / x     / x     / x      CARDIAC MARKERS ( 2018 06:05 )  0.186 ng/mL / x     / 74 U/L / x     / 6.2 ng/mL  CARDIAC MARKERS ( 2018 00:12 )  0.191 ng/mL / x     / 68 U/L / x     / 6.3 ng/mL      ABG - ( 2018 04:52 )  pH, Arterial: 7.52  pH, Blood: x     /  pCO2: 26    /  pO2: 169   / HCO3: 21    / Base Excess: -0.5  /  SaO2: >99               LIVER FUNCTIONS - ( 2018 05:48 )  Alb: 2.1 g/dL / Pro: 5.6 g/dL / ALK PHOS: 72 U/L / ALT: 15 U/L DA / AST: 22 U/L / GGT: x

## 2018-11-18 NOTE — PROGRESS NOTE ADULT - PROBLEM SELECTOR PLAN 2
Likely due to demand ischemia. Inferior lateralt WMA, EF 40, not a candidate for cath given recent surgery,  - C/w NPO, ASA, and Heparin  when okay from a surgical stand point.   - Hold beta blockers 2/2 shock

## 2018-11-18 NOTE — PROGRESS NOTE ADULT - PROBLEM SELECTOR PLAN 1
s/p Lysis of adhesion 11/16  Monitor I&Os  Continue Zosyn S/p lysis of adhesion 11/16  - C/w Zosyn and NGT to suction; output/24 hours 50  Surgery Dr Us

## 2018-11-18 NOTE — PROGRESS NOTE ADULT - PROBLEM SELECTOR PLAN 3
HADSVASC 4; hold AC 2/2 recent surgery; no need for rate control medications at this time  - F/u TTE and Cardiology consultation. CHADSVASC 4; held AC 2/2 recent surgery; no need for rate control medications at this time 2/2 pressor requirements  - TTE G3DD, LAE, and EF grossly WNLs  Cardiology consultation.

## 2018-11-18 NOTE — PROGRESS NOTE ADULT - ASSESSMENT
89 yr old woman with prior mentioned medical condition presented abdominal burning, vomiting due to SBO. 11/16 EX-LAP with MARIA ISABEL. No bowel resection. Post op Resp Failure requiring vent support.  Post of new on set Afib. sedated. failed SBT/SAT  today. CXR show pul edema and + fluid balance    - cont vent support , ngt suction, further care as per ICU team  - Off fentanyl

## 2018-11-19 LAB
BASE EXCESS BLDA CALC-SCNC: -3.4 MMOL/L — LOW (ref -2–2)
BLOOD GAS COMMENTS ARTERIAL: SIGNIFICANT CHANGE UP
HCO3 BLDA-SCNC: 20 MMOL/L — LOW (ref 23–27)
HOROWITZ INDEX BLDA+IHG-RTO: 40 — SIGNIFICANT CHANGE UP
PCO2 BLDA: 30 MMHG — LOW (ref 32–46)
PH BLDA: 7.43 — SIGNIFICANT CHANGE UP (ref 7.35–7.45)
PO2 BLDA: 177 MMHG — HIGH (ref 74–108)
SAO2 % BLDA: >99 % — SIGNIFICANT CHANGE UP (ref 92–96)

## 2018-11-19 PROCEDURE — 71045 X-RAY EXAM CHEST 1 VIEW: CPT | Mod: 26

## 2018-11-19 RX ORDER — ASPIRIN/CALCIUM CARB/MAGNESIUM 324 MG
81 TABLET ORAL DAILY
Qty: 0 | Refills: 0 | Status: DISCONTINUED | OUTPATIENT
Start: 2018-11-19 | End: 2018-11-21

## 2018-11-19 RX ORDER — FUROSEMIDE 40 MG
40 TABLET ORAL DAILY
Qty: 0 | Refills: 0 | Status: DISCONTINUED | OUTPATIENT
Start: 2018-11-19 | End: 2018-11-20

## 2018-11-19 RX ORDER — LISINOPRIL 2.5 MG/1
20 TABLET ORAL DAILY
Qty: 0 | Refills: 0 | Status: DISCONTINUED | OUTPATIENT
Start: 2018-11-19 | End: 2018-11-20

## 2018-11-19 RX ORDER — ATENOLOL 25 MG/1
25 TABLET ORAL DAILY
Qty: 0 | Refills: 0 | Status: DISCONTINUED | OUTPATIENT
Start: 2018-11-19 | End: 2018-11-21

## 2018-11-19 RX ADMIN — ATENOLOL 25 MILLIGRAM(S): 25 TABLET ORAL at 21:15

## 2018-11-19 RX ADMIN — Medication 600 MILLIGRAM(S): at 14:26

## 2018-11-19 RX ADMIN — LISINOPRIL 20 MILLIGRAM(S): 2.5 TABLET ORAL at 21:24

## 2018-11-19 RX ADMIN — CHLORHEXIDINE GLUCONATE 1 APPLICATION(S): 213 SOLUTION TOPICAL at 18:29

## 2018-11-19 RX ADMIN — HEPARIN SODIUM 5000 UNIT(S): 5000 INJECTION INTRAVENOUS; SUBCUTANEOUS at 05:16

## 2018-11-19 RX ADMIN — PIPERACILLIN AND TAZOBACTAM 25 GRAM(S): 4; .5 INJECTION, POWDER, LYOPHILIZED, FOR SOLUTION INTRAVENOUS at 21:20

## 2018-11-19 RX ADMIN — HEPARIN SODIUM 5000 UNIT(S): 5000 INJECTION INTRAVENOUS; SUBCUTANEOUS at 21:21

## 2018-11-19 RX ADMIN — PIPERACILLIN AND TAZOBACTAM 25 GRAM(S): 4; .5 INJECTION, POWDER, LYOPHILIZED, FOR SOLUTION INTRAVENOUS at 05:16

## 2018-11-19 RX ADMIN — CHLORHEXIDINE GLUCONATE 1 APPLICATION(S): 213 SOLUTION TOPICAL at 05:11

## 2018-11-19 RX ADMIN — PIPERACILLIN AND TAZOBACTAM 25 GRAM(S): 4; .5 INJECTION, POWDER, LYOPHILIZED, FOR SOLUTION INTRAVENOUS at 14:25

## 2018-11-19 RX ADMIN — PROPOFOL 1.75 MICROGRAM(S)/KG/MIN: 10 INJECTION, EMULSION INTRAVENOUS at 21:15

## 2018-11-19 RX ADMIN — HEPARIN SODIUM 5000 UNIT(S): 5000 INJECTION INTRAVENOUS; SUBCUTANEOUS at 14:25

## 2018-11-19 RX ADMIN — PANTOPRAZOLE SODIUM 40 MILLIGRAM(S): 20 TABLET, DELAYED RELEASE ORAL at 14:26

## 2018-11-19 NOTE — PROGRESS NOTE ADULT - SUBJECTIVE AND OBJECTIVE BOX
89F POD #3 s/p ex lap, MARIA ISABEL    No acute events overnight. Patient still intubated with restraints, more alert. Wants the ET tube out.    ICU Vital Signs Last 24 Hrs  T(C): 36.7 (19 Nov 2018 05:36), Max: 36.7 (18 Nov 2018 08:00)  T(F): 98.1 (19 Nov 2018 05:36), Max: 98.1 (19 Nov 2018 05:36)  HR: 79 (19 Nov 2018 07:00) (57 - 96)  BP: 139/108 (19 Nov 2018 07:00) (84/41 - 155/83)  BP(mean): 116 (19 Nov 2018 07:00) (48 - 126)  ABP: --  ABP(mean): --  RR: 24 (19 Nov 2018 07:00) (14 - 33)  SpO2: 100% (19 Nov 2018 07:00) (92% - 100%)    PE:  intubated, alert, GCS 11T  CTAB, vented on CPAP   abd soft, ND, wound c/d/i, no surrounding erythema or purulent drainage, the  NGT in place with bilious output  Allen in place with clear yellow urine   SCDs in place, no pedal edema    I/Os:  NGT: 100mL/12hrs/bilious  UOP: 660mL/12hrs/clear yellow                          11.8   9.8   )-----------( 164      ( 18 Nov 2018 05:48 )             36.2     11-18    142  |  110<H>  |  23<H>  ----------------------------<  100<H>  3.4<L>   |  20<L>  |  0.99    Ca    8.1<L>      18 Nov 2018 05:48  Phos  2.6     11-18  Mg     2.2     11-18    TPro  5.6<L>  /  Alb  2.1<L>  /  TBili  1.1  /  DBili  x   /  AST  22  /  ALT  15  /  AlkPhos  72  11-18    MEDICATIONS  (STANDING):  aspirin Suppository 600 milliGRAM(s) Rectal daily  chlorhexidine 4% Liquid 1 Application(s) Topical two times a day  dexmedetomidine Infusion 0.5 MICROgram(s)/kG/Hr (7.3 mL/Hr) IV Continuous <Continuous>  heparin  Injectable 5000 Unit(s) SubCutaneous every 8 hours  norepinephrine Infusion 0.05 MICROgram(s)/kG/Min (2.737 mL/Hr) IV Continuous <Continuous>  pantoprazole  Injectable 40 milliGRAM(s) IV Push daily  piperacillin/tazobactam IVPB. 3.375 Gram(s) IV Intermittent every 8 hours  propofol Infusion 5 MICROgram(s)/kG/Min (1.752 mL/Hr) IV Continuous <Continuous>    MEDICATIONS  (PRN):  morphine  - Injectable 2 milliGRAM(s) IV Push every 4 hours PRN Severe Pain (7 - 10)

## 2018-11-19 NOTE — PROGRESS NOTE ADULT - PROBLEM SELECTOR PLAN 3
CHADSVASC 4; held AC 2/2 recent surgery; no need for rate control medications at this time 2/2 pressor requirements  - TTE G3DD, LAE, and EF grossly WNLs  Cardiology consultation.

## 2018-11-19 NOTE — PROGRESS NOTE ADULT - SUBJECTIVE AND OBJECTIVE BOX
SUBJECTIVE / OVERNIGHT EVENTS: pt intubated     MEDICATIONS  (STANDING):  aspirin  chewable 81 milliGRAM(s) Oral daily  ATENolol  Tablet 25 milliGRAM(s) Oral daily  chlorhexidine 4% Liquid 1 Application(s) Topical two times a day  furosemide    Tablet 40 milliGRAM(s) Oral daily  heparin  Injectable 5000 Unit(s) SubCutaneous every 8 hours  lisinopril 20 milliGRAM(s) Oral daily  pantoprazole  Injectable 40 milliGRAM(s) IV Push daily  piperacillin/tazobactam IVPB. 3.375 Gram(s) IV Intermittent every 8 hours    MEDICATIONS  (PRN):  morphine  - Injectable 2 milliGRAM(s) IV Push every 4 hours PRN Severe Pain (7 - 10)    Vital Signs Last 24 Hrs  T(C): 36.8 (2018 15:51), Max: 36.8 (2018 15:51)  T(F): 98.3 (2018 15:51), Max: 98.3 (2018 15:51)  HR: 71 (2018 21:00) (58 - 91)  BP: 169/83 (2018 21:00) (86/50 - 192/104)  BP(mean): 101 (2018 21:00) (56 - 126)  RR: 20 (2018 21:00) (12 - 33)  SpO2: 97% (2018 21:00) (86% - 100%)        NECK: Supple, No JVD  CHEST/LUNG: dec breath sounds at base   HEART:  S1 , S2 +  ABDOMEN: soft, bs+, mild distension  EXTREMITIES:  trace edema    LABS:      142  |  110<H>  |  23<H>  ----------------------------<  100<H>  3.4<L>   |  20<L>  |  0.99    Ca    8.1<L>      2018 05:48  Phos  2.6       Mg     2.2         TPro  5.6<L>  /  Alb  2.1<L>  /  TBili  1.1  /  DBili      /  AST  22  /  ALT  15  /  AlkPhos  72      Creatinine Trend: 0.99 <--, 0.94 <--, 0.74 <--, 0.74 <--, 0.79 <--, 1.13 <--                        11.8   9.8   )-----------( 164      ( 2018 05:48 )             36.2     Urine Studies:  Urinalysis Basic - ( 2018 05:10 )    Color: Yellow / Appearance: Clear / S.010 / pH:   Gluc:  / Ketone: Trace  / Bili: Negative / Urobili: Negative   Blood:  / Protein: 100 / Nitrite: Negative   Leuk Esterase: Small / RBC: 2-5 /HPF / WBC 3-5 /HPF   Sq Epi:  / Non Sq Epi: Few /HPF / Bacteria: Trace /HPF        CARDIAC MARKERS ( 2018 05:48 )  0.134 ng/mL / x     / x     / x     / x          ABG - ( 2018 05:04 )  pH, Arterial: 7.43  pH, Blood: x     /  pCO2: 30    /  pO2: 177   / HCO3: 20    / Base Excess: -3.4  /  SaO2: >99               LIVER FUNCTIONS - ( 2018 05:48 )  Alb: 2.1 g/dL / Pro: 5.6 g/dL / ALK PHOS: 72 U/L / ALT: 15 U/L DA / AST: 22 U/L / GGT: x             LIVER FUNCTIONS - ( 2018 05:48 )  Alb: 2.1 g/dL / Pro: 5.6 g/dL / ALK PHOS: 72 U/L / ALT: 15 U/L DA / AST: 22 U/L / GGT: x

## 2018-11-19 NOTE — PROGRESS NOTE ADULT - ASSESSMENT
89F POD#3 s/p ex lap, MARIA ISABEL stable. Doing well.   - wean to extubate  - monitor bowel function  - cont NGT to low intermittent suction  - electrolyte repletion  - monitor UOP  - cont abx as per ID  - care as per ICU team

## 2018-11-19 NOTE — PROGRESS NOTE ADULT - ASSESSMENT
89 yr old woman with prior mentioned medical condition presented abdominal burning, vomiting due to SBO. 11/16 EX-LAP with MARIA ISABEL. No bowel resection. Post op Resp Failure requiring vent support.  Post of new on set Afib. sedated. failed SBT/SAT  today. CXR show pul edema and + fluid balance    - cont vent support , ngt suction, wean trial , further care as per ICU team  - Off fentanyl

## 2018-11-19 NOTE — PROGRESS NOTE ADULT - SUBJECTIVE AND OBJECTIVE BOX
INTERVAL HPI/OVERNIGHT EVENTS: ***    PRESSORS: [ ] YES [ ] NO  WHICH:    ANTIBIOTICS:                  DATE STARTED:  ANTIBIOTICS:                  DATE STARTED:  ANTIBIOTICS:                  DATE STARTED:    Antimicrobial:  piperacillin/tazobactam IVPB. 3.375 Gram(s) IV Intermittent every 8 hours    Cardiovascular:  norepinephrine Infusion 0.05 MICROgram(s)/kG/Min IV Continuous <Continuous>    Pulmonary:    Hematalogic:  heparin  Injectable 5000 Unit(s) SubCutaneous every 8 hours    Other:  aspirin Suppository 600 milliGRAM(s) Rectal daily  chlorhexidine 4% Liquid 1 Application(s) Topical two times a day  dexmedetomidine Infusion 0.5 MICROgram(s)/kG/Hr IV Continuous <Continuous>  morphine  - Injectable 2 milliGRAM(s) IV Push every 4 hours PRN  pantoprazole  Injectable 40 milliGRAM(s) IV Push daily  propofol Infusion 5 MICROgram(s)/kG/Min IV Continuous <Continuous>    aspirin Suppository 600 milliGRAM(s) Rectal daily  chlorhexidine 4% Liquid 1 Application(s) Topical two times a day  dexmedetomidine Infusion 0.5 MICROgram(s)/kG/Hr IV Continuous <Continuous>  heparin  Injectable 5000 Unit(s) SubCutaneous every 8 hours  morphine  - Injectable 2 milliGRAM(s) IV Push every 4 hours PRN  norepinephrine Infusion 0.05 MICROgram(s)/kG/Min IV Continuous <Continuous>  pantoprazole  Injectable 40 milliGRAM(s) IV Push daily  piperacillin/tazobactam IVPB. 3.375 Gram(s) IV Intermittent every 8 hours  propofol Infusion 5 MICROgram(s)/kG/Min IV Continuous <Continuous>    Drug Dosing Weight  Height (cm): 167.64 (16 Nov 2018 18:38)  Weight (kg): 58.4 (16 Nov 2018 18:38)  BMI (kg/m2): 20.8 (16 Nov 2018 18:38)  BSA (m2): 1.66 (16 Nov 2018 18:38)    CENTRAL LINE: [ ] YES [ ] NO  LOCATION:   DATE INSERTED:  REMOVE: [ ] YES [ ] NO  EXPLAIN:    ROSADO: [ ] YES [ ] NO    DATE INSERTED:  REMOVE:  [ ] YES [ ] NO  EXPLAIN:    A-LINE:  [ ] YES [ ] NO  LOCATION:   DATE INSERTED:  REMOVE:  [ ] YES [ ] NO  EXPLAIN:    PMH -reviewed admission note, no change since admission  Heart faliure: acute [ ] chronic [ ] acute or chronic [ ] diastolic [ ] systolic [ ] combied systolic and diastolic[ ]  MARLEN: ATN[ ] renal medullary necrosis [ ] CKD I [ ]CKDII [ ]CKD III [ ]CKD IV [ ]CKD V [ ]Other pathological lesions [ ]  Abdominal Nutrition Status: malnutrition [ ] cachexia [ ] morbid obesity/BMI=40 [ ] Supplement ordered [___________]     ICU Vital Signs Last 24 Hrs  T(C): 36.7 (19 Nov 2018 05:36), Max: 36.7 (18 Nov 2018 08:00)  T(F): 98.1 (19 Nov 2018 05:36), Max: 98.1 (19 Nov 2018 05:36)  HR: 91 (19 Nov 2018 06:00) (57 - 96)  BP: 155/83 (19 Nov 2018 06:00) (84/41 - 155/83)  BP(mean): 101 (19 Nov 2018 06:00) (48 - 126)  ABP: --  ABP(mean): --  RR: 33 (19 Nov 2018 06:00) (14 - 33)  SpO2: 95% (19 Nov 2018 06:00) (92% - 100%)      ABG - ( 19 Nov 2018 05:04 )  pH, Arterial: 7.43  pH, Blood: x     /  pCO2: 30    /  pO2: 177   / HCO3: 20    / Base Excess: -3.4  /  SaO2: >99               11-17 @ 07:01  -  11-18 @ 07:00  --------------------------------------------------------  IN: 1401.6 mL / OUT: 650 mL / NET: 751.6 mL        Mode: AC/ CMV (Assist Control/ Continuous Mandatory Ventilation)  RR (machine): 12  TV (machine): 400  FiO2: 40  PEEP: 5  ITime: 1  MAP: 8.6  PIP: 24      PHYSICAL EXAM:    GENERAL: [ ]NAD, [ ]well-groomed, [ ]well-developed  HEAD:  [ ]Atraumatic, [ ]Normocephalic  EYES: [ ]EOMI, [ ]PERRLA, [ ]conjunctiva and sclera clear  ENMT: [ ]No tonsillar erythema, exudates, or enlargement; [ ]Moist mucous membranes, [ ]Good dentition, [ ]No lesions  NECK: [ ]Supple, normal appearance, [ ]No JVD; [ ]Normal thyroid; [ ]Trachea midline  NERVOUS SYSTEM:  [ ]Alert & Oriented X3, [ ]Good concentration; [ ]Motor Strength 5/5 B/L upper and lower extremities; [ ]DTRs 2+ intact and symmetric  CHEST/LUNG: [ ]No chest deformity; [ ]Normal percussion bilaterally; [ ]No rales, rhonchi, wheezing   HEART: [ ]Regular rate and rhythm; [ ]No murmurs, rubs, or gallops  ABDOMEN: [ ]Soft, Nontender, Nondistended; [ ]Bowel sounds present  EXTREMITIES:  [ ]2+ Peripheral Pulses, [ ]No clubbing, cyanosis, or edema  LYMPH: [ ]No lymphadenopathy noted  SKIN: [ ]No rashes or lesions; [ ]Good capillary refill      LABS:  CBC Full  -  ( 18 Nov 2018 05:48 )  WBC Count : 9.8 K/uL  Hemoglobin : 11.8 g/dL  Hematocrit : 36.2 %  Platelet Count - Automated : 164 K/uL  Mean Cell Volume : 94.2 fl  Mean Cell Hemoglobin : 30.8 pg  Mean Cell Hemoglobin Concentration : 32.7 gm/dL  Auto Neutrophil # : x  Auto Lymphocyte # : x  Auto Monocyte # : x  Auto Eosinophil # : x  Auto Basophil # : x  Auto Neutrophil % : x  Auto Lymphocyte % : x  Auto Monocyte % : x  Auto Eosinophil % : x  Auto Basophil % : x    11-18    142  |  110<H>  |  23<H>  ----------------------------<  100<H>  3.4<L>   |  20<L>  |  0.99    Ca    8.1<L>      18 Nov 2018 05:48  Phos  2.6     11-18  Mg     2.2     11-18    TPro  5.6<L>  /  Alb  2.1<L>  /  TBili  1.1  /  DBili  x   /  AST  22  /  ALT  15  /  AlkPhos  72  11-18        Culture Results:   No growth (11-17 @ 13:06)  Culture Results:   No growth (11-16 @ 09:44)      RADIOLOGY & ADDITIONAL STUDIES REVIEWED:  ***    [ ]GOALS OF CARE DISCUSSION WITH PATIENT/FAMILY/PROXY:    CRITICAL CARE TIME SPENT: 35 minutes INTERVAL HPI/OVERNIGHT EVENTS: Patient seen and examined at bedside. Today the patient is extubated and she is having nebulizer treatment.    PRESSORS:  [ ] NO  WHICH:    ANTIBIOTICS:    zosyn        DATE STARTED: 11/16  ANTIBIOTICS:                  DATE STARTED:  ANTIBIOTICS:                  DATE STARTED:    Antimicrobial:  piperacillin/tazobactam IVPB. 3.375 Gram(s) IV Intermittent every 8 hours    Cardiovascular: furosemide   Injectable 40 milliGRAM(s) IV Push once      Pulmonary:    Hematalogic:  heparin  Injectable 5000 Unit(s) SubCutaneous every 8 hours    Other:  pantoprazole  Injectable 40 milliGRAM(s) IV Push daily    Drug Dosing Weight  Height (cm): 167.64 (16 Nov 2018 18:38)  Weight (kg): 58.4 (16 Nov 2018 18:38)  BMI (kg/m2): 20.8 (16 Nov 2018 18:38)  BSA (m2): 1.66 (16 Nov 2018 18:38)    CENTRAL LINE: [ ] YES  Rt IJ 11/16    ROSADO: [ ] YES  STRICT I/O  A-LINE:  [ ] NO    PMH -reviewed admission note, no change since admission  Heart faliure: acute [ ] chronic [ ] acute or chronic [ ] diastolic [ ] systolic [ ] combied systolic and diastolic[ ]  MARLEN: ATN[ ] renal medullary necrosis [ ] CKD I [ ]CKDII [ ]CKD III [ ]CKD IV [ ]CKD V [ ]Other pathological lesions [ ]  Abdominal Nutrition Status: malnutrition [ ] cachexia [ ] morbid obesity/BMI=40 [ ] Supplement ordered [___________]     ICU Vital Signs Last 24 Hrs  T(C): 36.7 (19 Nov 2018 05:36), Max: 36.7 (18 Nov 2018 08:00)  T(F): 98.1 (19 Nov 2018 05:36), Max: 98.1 (19 Nov 2018 05:36)  HR: 91 (19 Nov 2018 06:00) (57 - 96)  BP: 155/83 (19 Nov 2018 06:00) (84/41 - 155/83)  BP(mean): 101 (19 Nov 2018 06:00) (48 - 126)  ABP: --  ABP(mean): --  RR: 33 (19 Nov 2018 06:00) (14 - 33)  SpO2: 95% (19 Nov 2018 06:00) (92% - 100%)      ABG - ( 19 Nov 2018 05:04 )  pH, Arterial: 7.43  pH, Blood: x     /  pCO2: 30    /  pO2: 177   / HCO3: 20    / Base Excess: -3.4  /  SaO2: >99               11-17 @ 07:01  -  11-18 @ 07:00  --------------------------------------------------------  IN: 1401.6 mL / OUT: 650 mL / NET: 751.6 mL        Mode: AC/ CMV (Assist Control/ Continuous Mandatory Ventilation)  RR (machine): 12  TV (machine): 400  FiO2: 40  PEEP: 5  ITime: 1  MAP: 8.6  PIP: 24      PHYSICAL EXAM:    GENERAL: [ ]NAD, [ ]well-groomed, [ ]well-developed  HEAD:  [ ]Atraumatic, [ ]Normocephalic  EYES: [ ]EOMI, [ ]PERRLA, [ ]conjunctiva and sclera clear  ENMT: [ ]No tonsillar erythema, exudates, or enlargement; [ ]Moist mucous membranes, [ ]Good dentition, [ ]No lesions  NECK: [ ]Supple, normal appearance, [ ]No JVD; [ ]Normal thyroid; [ ]Trachea midline, central line   NERVOUS SYSTEM:  [ ]Alert & Oriented X3, [ ]Good concentration; [ ]Motor Strength 5/5 B/L upper and lower extremities; [ ]DTRs 2+ intact and symmetric  CHEST/LUNG: [ ]No chest deformity; [ ]Normal percussion bilaterally; [ ]No rales, rhonchi, wheezing   HEART: [ ]Regular rate and rhythm; [ ]No murmurs, rubs, or gallops  ABDOMEN: [ ]Soft, Nontender, Nondistended; [ ]Bowel sounds present, mid line abd wound dressing  EXTREMITIES:  [ ]2+ Peripheral Pulses, [ ]No clubbing, cyanosis, or edema  LYMPH: [ ]No lymphadenopathy noted  SKIN: [ ]No rashes or lesions; [ ]Good capillary refill      LABS:  CBC Full  -  ( 18 Nov 2018 05:48 )  WBC Count : 9.8 K/uL  Hemoglobin : 11.8 g/dL  Hematocrit : 36.2 %  Platelet Count - Automated : 164 K/uL  Mean Cell Volume : 94.2 fl  Mean Cell Hemoglobin : 30.8 pg  Mean Cell Hemoglobin Concentration : 32.7 gm/dL  Auto Neutrophil # : x  Auto Lymphocyte # : x  Auto Monocyte # : x  Auto Eosinophil # : x  Auto Basophil # : x  Auto Neutrophil % : x  Auto Lymphocyte % : x  Auto Monocyte % : x  Auto Eosinophil % : x  Auto Basophil % : x    11-18    142  |  110<H>  |  23<H>  ----------------------------<  100<H>  3.4<L>   |  20<L>  |  0.99    Ca    8.1<L>      18 Nov 2018 05:48  Phos  2.6     11-18  Mg     2.2     11-18    TPro  5.6<L>  /  Alb  2.1<L>  /  TBili  1.1  /  DBili  x   /  AST  22  /  ALT  15  /  AlkPhos  72  11-18        Culture Results:   No growth (11-17 @ 13:06)  Culture Results:   No growth (11-16 @ 09:44)      RADIOLOGY & ADDITIONAL STUDIES REVIEWED:  ***    [ ]GOALS OF CARE DISCUSSION WITH PATIENT/FAMILY/PROXY:    CRITICAL CARE TIME SPENT: 35 minutes

## 2018-11-19 NOTE — PROGRESS NOTE ADULT - PROBLEM SELECTOR PLAN 1
S/p Extubation day 1  - C/w Zosyn and NGT to low intermittent suction; output/24 hours 50  f/u Surgery Dr Us to start AC for Afib.  Await flatus prior to start diet.  Pulmonary toileting.  OOB to chair.

## 2018-11-19 NOTE — PROGRESS NOTE ADULT - SUBJECTIVE AND OBJECTIVE BOX
INTERVAL HPI/OVERNIGHT EVENTS:       Antimicrobial:  piperacillin/tazobactam IVPB. 3.375 Gram(s) IV Intermittent every 8 hours    Cardiovascular:  norepinephrine Infusion 0.05 MICROgram(s)/kG/Min IV Continuous <Continuous>    Pulmonary:    Hematalogic:  heparin  Injectable 5000 Unit(s) SubCutaneous every 8 hours    Other:  aspirin Suppository 600 milliGRAM(s) Rectal daily  chlorhexidine 4% Liquid 1 Application(s) Topical two times a day  dexmedetomidine Infusion 0.5 MICROgram(s)/kG/Hr IV Continuous <Continuous>  morphine  - Injectable 2 milliGRAM(s) IV Push every 4 hours PRN  pantoprazole  Injectable 40 milliGRAM(s) IV Push daily  propofol Infusion 5 MICROgram(s)/kG/Min IV Continuous <Continuous>      Drug Dosing Weight  Height (cm): 167.64 (16 Nov 2018 18:38)  Weight (kg): 58.4 (16 Nov 2018 18:38)  BMI (kg/m2): 20.8 (16 Nov 2018 18:38)  BSA (m2): 1.66 (16 Nov 2018 18:38)    CENTRAL LINE: [x ] YES [ ] NO  LOCATION:   DATE INSERTED:    ROSADO: [x ] YES [ ] NO    DATE INSERTED:    A-LINE:  [ ] YES [ ] NO  LOCATION:   DATE INSERTED:    PMH/Social Hx/Fam Hx -reviewed admission note, no change since admission  PAST MEDICAL & SURGICAL HISTORY:  Arthritis  HTN (hypertension)      T(C): 36.4 (11-19-18 @ 10:00), Max: 36.7 (11-19-18 @ 05:36)  HR: 81 (11-19-18 @ 10:51)  BP: 122/82 (11-19-18 @ 10:51)  BP(mean): 88 (11-19-18 @ 10:51)  ABP: --  ABP(mean): --  RR: 18 (11-19-18 @ 10:51)  SpO2: 100% (11-19-18 @ 10:51)  Wt(kg): --    ABG - ( 19 Nov 2018 05:04 )  pH, Arterial: 7.43  pH, Blood: x     /  pCO2: 30    /  pO2: 177   / HCO3: 20    / Base Excess: -3.4  /  SaO2: >99                   11-18 @ 07:01  -  11-19 @ 07:00  --------------------------------------------------------  IN: 537.7 mL / OUT: 760 mL / NET: -222.3 mL        Mode: CPAP with PS  RR (machine):   TV (machine):   FiO2: 40  PEEP: 5  PS: 7  ITime: 1  MAP: 7  PIP: 12      PHYSICAL EXAM:    GENERAL: No signs of distress, comfortable  HEAD: Atraumatic, Normocephalic  EYES: EOMI, PERRLA  ENMT: No erythema, exudates, or enlargement, Moist mucous membranes +ETT  NECK: Supple, normal appearance, No JVD; [  ] central line (if applicable)  CHEST/LUNG: No chest deformity, fair bilateral air entry; No rales, rhonchi, wheezing; crackles  HEART: Regular rate and rhythm; No murmurs, rubs, or gallops;   ABDOMEN: Soft, Nontender  + Midline abd wound dressing  EXTREMITIES:  + Peripheral Pulses, No clubbing, cyanosis, or edema  NERVOUS SYSTEM: awake and alert x 3, follows commands, upper and lower extremities  LYMPH: No lymphadenopathy noted  SKIN: No rashes or lesions; good turgor, warm, dry      LABS:  CBC Full  -  ( 18 Nov 2018 05:48 )  WBC Count : 9.8 K/uL  Hemoglobin : 11.8 g/dL  Hematocrit : 36.2 %  Platelet Count - Automated : 164 K/uL  Mean Cell Volume : 94.2 fl  Mean Cell Hemoglobin : 30.8 pg  Mean Cell Hemoglobin Concentration : 32.7 gm/dL  Auto Neutrophil # : x  Auto Lymphocyte # : x  Auto Monocyte # : x  Auto Eosinophil # : x  Auto Basophil # : x  Auto Neutrophil % : x  Auto Lymphocyte % : x  Auto Monocyte % : x  Auto Eosinophil % : x  Auto Basophil % : x    11-18    142  |  110<H>  |  23<H>  ----------------------------<  100<H>  3.4<L>   |  20<L>  |  0.99    Ca    8.1<L>      18 Nov 2018 05:48  Phos  2.6     11-18  Mg     2.2     11-18    TPro  5.6<L>  /  Alb  2.1<L>  /  TBili  1.1  /  DBili  x   /  AST  22  /  ALT  15  /  AlkPhos  72  11-18        Culture Results:   No growth (11-17 @ 13:06)      RADIOLOGY & ADDITIONAL STUDIES REVIEWED     CXR:  < from: Xray Chest 1 View- PORTABLE-Routine (11.18.18 @ 09:44) >  INTERPRETATION:  CLINICAL INFORMATION: Intubated.    A single portable view of the chest was obtained.     Comparison: 11/17/2018.     The mediastinal cardiac silhouette is unremarkable. Lines and tubes   unchanged from the previous exam.    Limited by rotation. Obscuration of the left hemidiaphragm may be related   to atelectasis or effusion or overlying soft tissues in this rotated   patient. The lungs are otherwise clear.    No acute osseous finding.     IMPRESSION:    Noted rotated examination as above.    < end of copied text >    IMPRESSION:  PAST MEDICAL & SURGICAL HISTORY:  Arthritis  HTN (hypertension)   p/w     IMP: This is a 89 yr old woman with prior mentioned medical condition presented abdominal burning, vomiting due to SBO. 11/16 EX-LAP with MARIA ISABEL. No bowel resection. Post op Resp Failure requiring vent support.  Post of new on set Afib. sedated. failed SBT/SAT  today. CXR show pul edema and + fluid balance. Surgical f/u noted ..  feeding after flatus            Plan:      CNS: no sedation    PULMONARY: sbt/sat.. will extubate    CARDIAC: monitor    GI:npo    RENAL: keep neg fluid balance    SKIN: wound care     MUSCULOSKELETAL: OOB to chair aferextubation    ID: continue    HEME:monitor    DVT and GI Prophylaxis    GOALS OF CARE DISCUSSION WITH PATIENT/FAMILY/PROXY/ icu team and son at bedside    CRITICAL CARE TIME SPENT: 35 minutes

## 2018-11-20 DIAGNOSIS — M25.511 PAIN IN RIGHT SHOULDER: ICD-10-CM

## 2018-11-20 DIAGNOSIS — Z98.890 OTHER SPECIFIED POSTPROCEDURAL STATES: ICD-10-CM

## 2018-11-20 DIAGNOSIS — M25.561 PAIN IN RIGHT KNEE: ICD-10-CM

## 2018-11-20 DIAGNOSIS — I10 ESSENTIAL (PRIMARY) HYPERTENSION: ICD-10-CM

## 2018-11-20 LAB
ANION GAP SERPL CALC-SCNC: 12 MMOL/L — SIGNIFICANT CHANGE UP (ref 5–17)
BUN SERPL-MCNC: 29 MG/DL — HIGH (ref 7–18)
CALCIUM SERPL-MCNC: 8.7 MG/DL — SIGNIFICANT CHANGE UP (ref 8.4–10.5)
CHLORIDE SERPL-SCNC: 107 MMOL/L — SIGNIFICANT CHANGE UP (ref 96–108)
CO2 SERPL-SCNC: 21 MMOL/L — LOW (ref 22–31)
CREAT SERPL-MCNC: 1.12 MG/DL — SIGNIFICANT CHANGE UP (ref 0.5–1.3)
GLUCOSE SERPL-MCNC: 106 MG/DL — HIGH (ref 70–99)
HCT VFR BLD CALC: 38.6 % — SIGNIFICANT CHANGE UP (ref 34.5–45)
HGB BLD-MCNC: 12.3 G/DL — SIGNIFICANT CHANGE UP (ref 11.5–15.5)
MAGNESIUM SERPL-MCNC: 2.2 MG/DL — SIGNIFICANT CHANGE UP (ref 1.6–2.6)
MCHC RBC-ENTMCNC: 30.5 PG — SIGNIFICANT CHANGE UP (ref 27–34)
MCHC RBC-ENTMCNC: 31.9 GM/DL — LOW (ref 32–36)
MCV RBC AUTO: 95.7 FL — SIGNIFICANT CHANGE UP (ref 80–100)
PHOSPHATE SERPL-MCNC: 3.4 MG/DL — SIGNIFICANT CHANGE UP (ref 2.5–4.5)
PLATELET # BLD AUTO: 202 K/UL — SIGNIFICANT CHANGE UP (ref 150–400)
POTASSIUM SERPL-MCNC: 3.5 MMOL/L — SIGNIFICANT CHANGE UP (ref 3.5–5.3)
POTASSIUM SERPL-SCNC: 3.5 MMOL/L — SIGNIFICANT CHANGE UP (ref 3.5–5.3)
RBC # BLD: 4.04 M/UL — SIGNIFICANT CHANGE UP (ref 3.8–5.2)
RBC # FLD: 13.8 % — SIGNIFICANT CHANGE UP (ref 10.3–14.5)
SODIUM SERPL-SCNC: 140 MMOL/L — SIGNIFICANT CHANGE UP (ref 135–145)
WBC # BLD: 10 K/UL — SIGNIFICANT CHANGE UP (ref 3.8–10.5)
WBC # FLD AUTO: 10 K/UL — SIGNIFICANT CHANGE UP (ref 3.8–10.5)

## 2018-11-20 PROCEDURE — 99222 1ST HOSP IP/OBS MODERATE 55: CPT

## 2018-11-20 RX ORDER — HYDRALAZINE HCL 50 MG
5 TABLET ORAL ONCE
Qty: 0 | Refills: 0 | Status: COMPLETED | OUTPATIENT
Start: 2018-11-20 | End: 2018-11-20

## 2018-11-20 RX ORDER — ENOXAPARIN SODIUM 100 MG/ML
60 INJECTION SUBCUTANEOUS DAILY
Qty: 0 | Refills: 0 | Status: DISCONTINUED | OUTPATIENT
Start: 2018-11-20 | End: 2018-11-21

## 2018-11-20 RX ORDER — MORPHINE SULFATE 50 MG/1
2 CAPSULE, EXTENDED RELEASE ORAL ONCE
Qty: 0 | Refills: 0 | Status: DISCONTINUED | OUTPATIENT
Start: 2018-11-20 | End: 2018-11-20

## 2018-11-20 RX ORDER — ACETAMINOPHEN 500 MG
650 TABLET ORAL EVERY 6 HOURS
Qty: 0 | Refills: 0 | Status: DISCONTINUED | OUTPATIENT
Start: 2018-11-20 | End: 2018-11-21

## 2018-11-20 RX ORDER — GABAPENTIN 400 MG/1
100 CAPSULE ORAL AT BEDTIME
Qty: 0 | Refills: 0 | Status: DISCONTINUED | OUTPATIENT
Start: 2018-11-20 | End: 2018-11-21

## 2018-11-20 RX ORDER — ACETAMINOPHEN 500 MG
1000 TABLET ORAL ONCE
Qty: 0 | Refills: 0 | Status: COMPLETED | OUTPATIENT
Start: 2018-11-20 | End: 2018-11-20

## 2018-11-20 RX ORDER — LIDOCAINE 4 G/100G
2 CREAM TOPICAL DAILY
Qty: 0 | Refills: 0 | Status: DISCONTINUED | OUTPATIENT
Start: 2018-11-20 | End: 2018-11-21

## 2018-11-20 RX ORDER — HYDRALAZINE HCL 50 MG
25 TABLET ORAL ONCE
Qty: 0 | Refills: 0 | Status: COMPLETED | OUTPATIENT
Start: 2018-11-20 | End: 2018-11-20

## 2018-11-20 RX ORDER — LISINOPRIL 2.5 MG/1
40 TABLET ORAL DAILY
Qty: 0 | Refills: 0 | Status: DISCONTINUED | OUTPATIENT
Start: 2018-11-20 | End: 2018-11-21

## 2018-11-20 RX ADMIN — MORPHINE SULFATE 2 MILLIGRAM(S): 50 CAPSULE, EXTENDED RELEASE ORAL at 13:46

## 2018-11-20 RX ADMIN — Medication 400 MILLIGRAM(S): at 13:54

## 2018-11-20 RX ADMIN — PANTOPRAZOLE SODIUM 40 MILLIGRAM(S): 20 TABLET, DELAYED RELEASE ORAL at 13:55

## 2018-11-20 RX ADMIN — Medication 1000 MILLIGRAM(S): at 18:46

## 2018-11-20 RX ADMIN — CHLORHEXIDINE GLUCONATE 1 APPLICATION(S): 213 SOLUTION TOPICAL at 05:23

## 2018-11-20 RX ADMIN — PIPERACILLIN AND TAZOBACTAM 25 GRAM(S): 4; .5 INJECTION, POWDER, LYOPHILIZED, FOR SOLUTION INTRAVENOUS at 22:18

## 2018-11-20 RX ADMIN — PIPERACILLIN AND TAZOBACTAM 25 GRAM(S): 4; .5 INJECTION, POWDER, LYOPHILIZED, FOR SOLUTION INTRAVENOUS at 05:46

## 2018-11-20 RX ADMIN — LIDOCAINE 2 PATCH: 4 CREAM TOPICAL at 19:59

## 2018-11-20 RX ADMIN — MORPHINE SULFATE 2 MILLIGRAM(S): 50 CAPSULE, EXTENDED RELEASE ORAL at 09:30

## 2018-11-20 RX ADMIN — Medication 400 MILLIGRAM(S): at 18:19

## 2018-11-20 RX ADMIN — Medication 5 MILLIGRAM(S): at 04:21

## 2018-11-20 RX ADMIN — LIDOCAINE 2 PATCH: 4 CREAM TOPICAL at 17:12

## 2018-11-20 RX ADMIN — MORPHINE SULFATE 2 MILLIGRAM(S): 50 CAPSULE, EXTENDED RELEASE ORAL at 08:15

## 2018-11-20 RX ADMIN — PIPERACILLIN AND TAZOBACTAM 25 GRAM(S): 4; .5 INJECTION, POWDER, LYOPHILIZED, FOR SOLUTION INTRAVENOUS at 13:56

## 2018-11-20 RX ADMIN — CHLORHEXIDINE GLUCONATE 1 APPLICATION(S): 213 SOLUTION TOPICAL at 17:12

## 2018-11-20 RX ADMIN — Medication 25 MILLIGRAM(S): at 02:27

## 2018-11-20 RX ADMIN — HEPARIN SODIUM 5000 UNIT(S): 5000 INJECTION INTRAVENOUS; SUBCUTANEOUS at 05:46

## 2018-11-20 RX ADMIN — ENOXAPARIN SODIUM 60 MILLIGRAM(S): 100 INJECTION SUBCUTANEOUS at 13:54

## 2018-11-20 RX ADMIN — Medication 40 MILLIGRAM(S): at 01:11

## 2018-11-20 RX ADMIN — MORPHINE SULFATE 2 MILLIGRAM(S): 50 CAPSULE, EXTENDED RELEASE ORAL at 14:16

## 2018-11-20 RX ADMIN — Medication 81 MILLIGRAM(S): at 13:55

## 2018-11-20 RX ADMIN — Medication 1000 MILLIGRAM(S): at 14:15

## 2018-11-20 NOTE — PROGRESS NOTE ADULT - SUBJECTIVE AND OBJECTIVE BOX
89F POD #4 s/p ex lap, MARIA ISABEL    No acute events overnight. Yesterday, patient was extubated. Placed on nasal cannula. Patient states pain is controlled, but has increased pain when passing flatus. Pt also having some soft BMs. Tolerating CLD without N/V. Has been OOB    Vital Signs Last 24 Hrs  T(C): 35.8 (20 Nov 2018 05:00), Max: 36.8 (19 Nov 2018 15:51)  T(F): 96.4 (20 Nov 2018 05:00), Max: 98.3 (19 Nov 2018 15:51)  HR: 68 (20 Nov 2018 07:00) (57 - 85)  BP: 172/82 (20 Nov 2018 07:00) (122/82 - 192/104)  BP(mean): 105 (20 Nov 2018 07:00) (80 - 126)  RR: 24 (20 Nov 2018 07:00) (12 - 30)  SpO2: 93% (20 Nov 2018 07:00) (93% - 100%)    PE:   NAD, resting comfortably  nonlabored breathing   abd soft, ND, wound c/d/i, no surrounding erythema or purulent drainage, packing replaced  Allen in place with clear yellow urine   SCDs in place, no pedal edema    I/Os:  UOP: 620mL/12hrs/clear yellow                          12.3   10.0  )-----------( 202      ( 20 Nov 2018 06:18 )             38.6     11-20    140  |  107  |  29<H>  ----------------------------<  106<H>  3.5   |  21<L>  |  1.12    Ca    8.7      20 Nov 2018 06:18  Phos  3.4     11-20  Mg     2.2     11-20    MEDICATIONS  (STANDING):  aspirin  chewable 81 milliGRAM(s) Oral daily  ATENolol  Tablet 25 milliGRAM(s) Oral daily  chlorhexidine 4% Liquid 1 Application(s) Topical two times a day  furosemide    Tablet 40 milliGRAM(s) Oral daily  heparin  Injectable 5000 Unit(s) SubCutaneous every 8 hours  lisinopril 40 milliGRAM(s) Oral daily  pantoprazole  Injectable 40 milliGRAM(s) IV Push daily  piperacillin/tazobactam IVPB. 3.375 Gram(s) IV Intermittent every 8 hours    MEDICATIONS  (PRN):  morphine  - Injectable 2 milliGRAM(s) IV Push every 4 hours PRN Severe Pain (7 - 10)

## 2018-11-20 NOTE — PHYSICAL THERAPY INITIAL EVALUATION ADULT - LIVES WITH, PROFILE
in 2nd floor of house with flight of stairs-right rail going up. Son lives on first floor of house/alone

## 2018-11-20 NOTE — CONSULT NOTE ADULT - SUBJECTIVE AND OBJECTIVE BOX
MARTI ALLEN  89y  Female      Patient is a 89y old  Female who presents with a chief complaint of sbo (20 Nov 2018 12:35).  Pt admitted with SBO and underwent exp- lap, ally pod#4.  Pt has history of arthritis bilateral knees and shoulders.       PAST MEDICAL/SURGICAL HISTORY  PAST MEDICAL & SURGICAL HISTORY:  Arthritis  HTN (hypertension)      REVIEW OF SYSTEMS:  CONSTITUTIONAL: No fever, weight loss, or fatigue  RESPIRATORY: No cough, wheezing, chills or hemoptysis; No shortness of breath  CARDIOVASCULAR: No chest pain, palpitations, dizziness, or leg swelling  GASTROINTESTINAL: No abdominal or epigastric pain. No nausea, vomiting, or hematemesis; No diarrhea or constipation  GENITOURINARY: No dysuria, frequency, hematuria, or incontinence  NEUROLOGICAL: No headaches, memory loss, loss of strength, numbness, or tremors  SKIN: No itching, burning, rashes, or lesions   MUSCULOSKELETAL: No joint pain or swelling; No muscle, back, or extremity pain  PSYCHIATRIC: No depression, anxiety, mood swings, or difficulty sleeping    T(C): 36.1 (11-20-18 @ 09:00), Max: 36.8 (11-19-18 @ 15:51)  HR: 65 (11-20-18 @ 12:00) (57 - 77)  BP: 163/88 (11-20-18 @ 12:00) (141/65 - 192/104)  RR: 24 (11-20-18 @ 12:00) (12 - 30)  SpO2: 96% (11-20-18 @ 12:00) (92% - 100%)  Wt(kg): --Vital Signs Last 24 Hrs  T(C): 36.1 (20 Nov 2018 09:00), Max: 36.8 (19 Nov 2018 15:51)  T(F): 97 (20 Nov 2018 09:00), Max: 98.3 (19 Nov 2018 15:51)  HR: 65 (20 Nov 2018 12:00) (57 - 77)  BP: 163/88 (20 Nov 2018 12:00) (141/65 - 192/104)  BP(mean): 105 (20 Nov 2018 12:00) (80 - 126)  RR: 24 (20 Nov 2018 12:00) (12 - 30)  SpO2: 96% (20 Nov 2018 12:00) (92% - 100%)    PHYSICAL EXAM:  GENERAL: NAD, well-groomed, well-developed  HEAD:  Atraumatic, Normocephalic  NECK: Supple, No JVD, Normal thyroid  NERVOUS SYSTEM:  Alert & Oriented X3, Good concentration;   CHEST/LUNG: Clear to percussion bilaterally; No rales, rhonchi, wheezing, or rubs  HEART: Regular rate and rhythm; No murmurs, rubs, or gallops  ABDOMEN: Soft, Nontender, Nondistended; Bowel sounds present  EXTREMITIES:  2+ Peripheral Pulses, No clubbing, cyanosis, or edema  MUSCULOSKELETAL:  SKIN: No rashes or lesions    Consultant(s) Notes Reviewed:  [x ] YES  [ ] NO  Care Discussed with Consultants/Other Providers [ x] YES  [ ] NO  ISTOP [ ] Yes  [  ] No      LABS:  CBC   11-20-18 @ 06:18  Hematcorit 38.6  Hemoglobin 12.3  Mean Cell Hemoglobin 30.5  Platelet Count-Automated 202  RBC Count 4.04  Red Cell Distrib Width 13.8  Wbc Count 10.0      BMP  11-20-18 @ 06:18  Anion Gap. Serum 12  Blood Urea Nitrogen,Serm 29  Calcium, Total Serum 8.7  Carbon Dioxide, Serum 21  Chloride, Serum 107  Creatinine, Serum 1.12  eGFR in  49  eGFR in Non Afican American 42  Gloucose, serum 106  Potassium, Serum 3.5  Sodium, Serum 140              11-18-18 @ 05:48  Anion Gap. Serum 12  Blood Urea Nitrogen,Serm 23  Calcium, Total Serum 8.1  Carbon Dioxide, Serum 20  Chloride, Serum 110  Creatinine, Serum 0.99  eGFR in  57  eGFR in Non Afican American 49  Gloucose, serum 100  Potassium, Serum 3.4  Sodium, Serum 142                  CMP  11-20-18 @ 06:18  Kaelyn Aminotransferase(ALT/SGPT)--  Albumin, Serum --  Alkaline Phosphatase, Serum --  Anion Gap, Serum 12  Aspartate Aminotransferase (AST/SGOT)--  Bilirubin Total, Serum --  Blood Urea Nitrogen, Serum 29  Calcium,Total Serum 8.7  Carbon Dioxide, Serum 21  Chloride, Serum 107  Creatinine, Serum 1.12  eGFR if  49  eGFR if Non African American 42  Glucose, Serum 106  Potassium, Serum 3.5  Protein Total, Serum --  Sodium, Serum 140                      11-18-18 @ 05:48  Kaelyn Aminotransferase(ALT/SGPT)15  Albumin, Serum 2.1  Alkaline Phosphatase, Serum 72  Anion Gap, Serum 12  Aspartate Aminotransferase (AST/SGOT)22  Bilirubin Total, Serum 1.1  Blood Urea Nitrogen, Serum 23  Calcium,Total Serum 8.1  Carbon Dioxide, Serum 20  Chloride, Serum 110  Creatinine, Serum 0.99  eGFR if  57  eGFR if Non African American 49  Glucose, Serum 100  Potassium, Serum 3.4  Protein Total, Serum 5.6  Sodium, Serum 142                          PT/INR      Amylase/Lipase            RADIOLOGY & ADDITIONAL TESTS:    Imaging Personally Reviewed:  [ ] YES  [ ] NO MARTI ALLEN  89y  Female      Patient is a 89y old  Female who presents with a chief complaint of sbo (20 Nov 2018 12:35).  Pt admitted with SBO and underwent exp- lap, ally pod#4.  Pt has history of arthritis bilateral knees and shoulders. Pt was scheduled for joint replacement at [facility] but cancelled it after fears of surgery.  Pt takes advil otc and applies a cream to bilateral knees.  Pt also has a history of shoulder pain.  Pt has a ligament tear but as not undergone surgery.  No recent injections in knees or shoulders.  Pt is Syrian speaking but denied use of  services and asked for translation via son is by bedside.        PAST MEDICAL/SURGICAL HISTORY  PAST MEDICAL & SURGICAL HISTORY:  Arthritis  HTN (hypertension)      REVIEW OF SYSTEMS:  CONSTITUTIONAL: No fever, weight loss, or fatigue  RESPIRATORY: No cough, wheezing, chills or hemoptysis; No shortness of breath  CARDIOVASCULAR: No chest pain, palpitations, dizziness, or leg swelling  GASTROINTESTINAL: + abdominal pain, + mild distention  GENITOURINARY: + mesa  NEUROLOGICAL: No headaches, memory loss, loss of strength, numbness, or tremors  MUSCULOSKELETAL: + bilateral knee pain, left shoulder pain    T(C): 36.1 (11-20-18 @ 09:00), Max: 36.8 (11-19-18 @ 15:51)  HR: 65 (11-20-18 @ 12:00) (57 - 77)  BP: 163/88 (11-20-18 @ 12:00) (141/65 - 192/104)  RR: 24 (11-20-18 @ 12:00) (12 - 30)  SpO2: 96% (11-20-18 @ 12:00) (92% - 100%)  Wt(kg): --Vital Signs Last 24 Hrs  T(C): 36.1 (20 Nov 2018 09:00), Max: 36.8 (19 Nov 2018 15:51)  T(F): 97 (20 Nov 2018 09:00), Max: 98.3 (19 Nov 2018 15:51)  HR: 65 (20 Nov 2018 12:00) (57 - 77)  BP: 163/88 (20 Nov 2018 12:00) (141/65 - 192/104)  BP(mean): 105 (20 Nov 2018 12:00) (80 - 126)  RR: 24 (20 Nov 2018 12:00) (12 - 30)  SpO2: 96% (20 Nov 2018 12:00) (92% - 100%)    PHYSICAL EXAM:  GENERAL: NAD  NERVOUS SYSTEM:  Alert & Oriented X3, Good concentration;   CHEST/LUNG: decreased breath sounds  HEART: Regular rate and rhythm; No murmurs, rubs, or gallops  ABDOMEN: Soft, Nontender, Nondistended; Bowel sounds present  EXTREMITIES:  2+ Peripheral Pulses, No clubbing, cyanosis, or edema  MUSCULOSKELETAL:  decreased rom + bilateral knee pain      Consultant(s) Notes Reviewed:  [x ] YES  [ ] NO  Care Discussed with Consultants/Other Providers [ x] YES  [ ] NO  ISTOP [ ] Yes  [  ] No      LABS:  CBC   11-20-18 @ 06:18  Hematcorit 38.6  Hemoglobin 12.3  Mean Cell Hemoglobin 30.5  Platelet Count-Automated 202  RBC Count 4.04  Red Cell Distrib Width 13.8  Wbc Count 10.0      BMP  11-20-18 @ 06:18  Anion Gap. Serum 12  Blood Urea Nitrogen,Serm 29  Calcium, Total Serum 8.7  Carbon Dioxide, Serum 21  Chloride, Serum 107  Creatinine, Serum 1.12  eGFR in  49  eGFR in Non Afican American 42  Gloucose, serum 106  Potassium, Serum 3.5  Sodium, Serum 140              11-18-18 @ 05:48  Anion Gap. Serum 12  Blood Urea Nitrogen,Serm 23  Calcium, Total Serum 8.1  Carbon Dioxide, Serum 20  Chloride, Serum 110  Creatinine, Serum 0.99  eGFR in  57  eGFR in Non Afican American 49  Gloucose, serum 100  Potassium, Serum 3.4  Sodium, Serum 142                  CMP  11-20-18 @ 06:18  Kaelyn Aminotransferase(ALT/SGPT)--  Albumin, Serum --  Alkaline Phosphatase, Serum --  Anion Gap, Serum 12  Aspartate Aminotransferase (AST/SGOT)--  Bilirubin Total, Serum --  Blood Urea Nitrogen, Serum 29  Calcium,Total Serum 8.7  Carbon Dioxide, Serum 21  Chloride, Serum 107  Creatinine, Serum 1.12  eGFR if  49  eGFR if Non African American 42  Glucose, Serum 106  Potassium, Serum 3.5  Protein Total, Serum --  Sodium, Serum 140                      11-18-18 @ 05:48  Kaelyn Aminotransferase(ALT/SGPT)15  Albumin, Serum 2.1  Alkaline Phosphatase, Serum 72  Anion Gap, Serum 12  Aspartate Aminotransferase (AST/SGOT)22  Bilirubin Total, Serum 1.1  Blood Urea Nitrogen, Serum 23  Calcium,Total Serum 8.1  Carbon Dioxide, Serum 20  Chloride, Serum 110  Creatinine, Serum 0.99  eGFR if  57  eGFR if Non African American 49  Glucose, Serum 100  Potassium, Serum 3.4  Protein Total, Serum 5.6  Sodium, Serum 142                          PT/INR      Amylase/Lipase            RADIOLOGY & ADDITIONAL TESTS:    Imaging Personally Reviewed:  [ ] YES  [ ] NO MARTI ALLEN  89y  Female      Patient is a 89y old  Female who presents with a chief complaint of sbo (20 Nov 2018 12:35).  Pt admitted with SBO and underwent exp- lap, ally pod#4.  Pt has history of arthritis bilateral knees and shoulders. Pt was scheduled for joint replacement at [facility] but cancelled it after fears of surgery.  Pt takes advil otc and applies a cream to bilateral knees.  Pt also has a history of shoulder pain.  Pt has a ligament tear but as not undergone surgery.  No recent injections in knees or shoulders.  Pt is Estonian speaking but denied use of  services and asked for translation via son is by bedside.        PAST MEDICAL/SURGICAL HISTORY  PAST MEDICAL & SURGICAL HISTORY:  Arthritis  HTN (hypertension)      REVIEW OF SYSTEMS:  CONSTITUTIONAL: No fever, weight loss, or fatigue  RESPIRATORY: No cough, wheezing, chills or hemoptysis; No shortness of breath  CARDIOVASCULAR: No chest pain, palpitations, dizziness, or leg swelling  GASTROINTESTINAL: + abdominal pain, + mild distention  GENITOURINARY: + mesa  NEUROLOGICAL: No headaches, memory loss, loss of strength, numbness, or tremors  MUSCULOSKELETAL: + bilateral knee pain, left shoulder pain    T(C): 36.1 (11-20-18 @ 09:00), Max: 36.8 (11-19-18 @ 15:51)  HR: 65 (11-20-18 @ 12:00) (57 - 77)  BP: 163/88 (11-20-18 @ 12:00) (141/65 - 192/104)  RR: 24 (11-20-18 @ 12:00) (12 - 30)  SpO2: 96% (11-20-18 @ 12:00) (92% - 100%)  Wt(kg): --Vital Signs Last 24 Hrs  T(C): 36.1 (20 Nov 2018 09:00), Max: 36.8 (19 Nov 2018 15:51)  T(F): 97 (20 Nov 2018 09:00), Max: 98.3 (19 Nov 2018 15:51)  HR: 65 (20 Nov 2018 12:00) (57 - 77)  BP: 163/88 (20 Nov 2018 12:00) (141/65 - 192/104)  BP(mean): 105 (20 Nov 2018 12:00) (80 - 126)  RR: 24 (20 Nov 2018 12:00) (12 - 30)  SpO2: 96% (20 Nov 2018 12:00) (92% - 100%)    PHYSICAL EXAM:  GENERAL: NAD  NERVOUS SYSTEM:  Alert & Oriented X3, Good concentration;   CHEST/LUNG: decreased breath sounds  HEART: Regular rate and rhythm; No murmurs, rubs, or gallops  ABDOMEN: + tenderness, + dressing - serosanguinous drainage  EXTREMITIES:  2+ Peripheral Pulses, No clubbing, cyanosis, or edema  MUSCULOSKELETAL:  decreased rom + bilateral knee pain - right >left, + right knee edema      Consultant(s) Notes Reviewed:  [x ] YES  [ ] NO  Care Discussed with Consultants/Other Providers [ x] YES  [ ] NO  ISTOP [ ] Yes  [  ] No      LABS:  CBC   11-20-18 @ 06:18  Hematcorit 38.6  Hemoglobin 12.3  Mean Cell Hemoglobin 30.5  Platelet Count-Automated 202  RBC Count 4.04  Red Cell Distrib Width 13.8  Wbc Count 10.0      BMP  11-20-18 @ 06:18  Anion Gap. Serum 12  Blood Urea Nitrogen,Serm 29  Calcium, Total Serum 8.7  Carbon Dioxide, Serum 21  Chloride, Serum 107  Creatinine, Serum 1.12  eGFR in  49  eGFR in Non Afican American 42  Gloucose, serum 106  Potassium, Serum 3.5  Sodium, Serum 140              11-18-18 @ 05:48  Anion Gap. Serum 12  Blood Urea Nitrogen,Serm 23  Calcium, Total Serum 8.1  Carbon Dioxide, Serum 20  Chloride, Serum 110  Creatinine, Serum 0.99  eGFR in  57  eGFR in Non Afican American 49  Gloucose, serum 100  Potassium, Serum 3.4  Sodium, Serum 142                  CMP  11-20-18 @ 06:18  Kaelyn Aminotransferase(ALT/SGPT)--  Albumin, Serum --  Alkaline Phosphatase, Serum --  Anion Gap, Serum 12  Aspartate Aminotransferase (AST/SGOT)--  Bilirubin Total, Serum --  Blood Urea Nitrogen, Serum 29  Calcium,Total Serum 8.7  Carbon Dioxide, Serum 21  Chloride, Serum 107  Creatinine, Serum 1.12  eGFR if  49  eGFR if Non African American 42  Glucose, Serum 106  Potassium, Serum 3.5  Protein Total, Serum --  Sodium, Serum 140                      11-18-18 @ 05:48  Kaelyn Aminotransferase(ALT/SGPT)15  Albumin, Serum 2.1  Alkaline Phosphatase, Serum 72  Anion Gap, Serum 12  Aspartate Aminotransferase (AST/SGOT)22  Bilirubin Total, Serum 1.1  Blood Urea Nitrogen, Serum 23  Calcium,Total Serum 8.1  Carbon Dioxide, Serum 20  Chloride, Serum 110  Creatinine, Serum 0.99  eGFR if  57  eGFR if Non African American 49  Glucose, Serum 100  Potassium, Serum 3.4  Protein Total, Serum 5.6  Sodium, Serum 142                          PT/INR      Amylase/Lipase            RADIOLOGY & ADDITIONAL TESTS:    Imaging Personally Reviewed:  [ ] YES  [ ] NO

## 2018-11-20 NOTE — PROGRESS NOTE ADULT - ASSESSMENT
89 yr old woman with prior mentioned medical condition presented abdominal burning, vomiting due to SBO. 11/16 EX-LAP with MARIA ISABEL. No bowel resection. Post op Resp Failure requiring vent support.  Post of new on set Afib. sedated. failed SBT/SAT  today. CXR show pul edema and + fluid balance

## 2018-11-20 NOTE — PROGRESS NOTE ADULT - PROBLEM SELECTOR PLAN 1
S/p NGT removal   - C/w Zosyn   f/u Surgery Dr Us to start AC for Afib.  .Tolerating CLD  OOB to chair. S/p NGT removal   - C/w Zosyn   f/u Surgery Dr Us to start AC for Afib.  Right internal jugular line was removed on         after explaining the patient about risks and beneifits at the bedside. Sterile technique was used and we cleaned the site with chlorhexidene. No bleeding from the removal site. Pressure was applied with gauze piece and plaster was placed.    .Tolerating CLD  OOB to chair.

## 2018-11-20 NOTE — CONSULT NOTE ADULT - PROBLEM SELECTOR RECOMMENDATION 9
- chronic pain - bilateral knees due to arthritis  - iv acetaminophen atc for 4 doses  - lidocaine patches bilateral knees  - morphine iv prn for now - will transition to po opioids when pt tolerating diet  - nsaids sparingly  - gabapentin 100mg po q hs  - oob

## 2018-11-20 NOTE — CHART NOTE - NSCHARTNOTEFT_GEN_A_CORE
89 F PMHx HTN and a significant PSH , Open cholecystectomy, and Hysterectomy presents to ED c/o constant burning abdominal pain , prior BM/flatus but  found to have SBO in ED and new onset rate controlled Afib in the ED - admitted to surgery service for management - underwent  exploratory laparotomy w/ MARIA ISABEL; no bowel resection. NGT placed  c/w on low suction x 24 hours. She was on mechanical ventilation for mild respiratory alkalosis. She was treated with antibiotics, metoprolol and hydralazine. She had Afib but initially she was not on anticoagulants but started on heparin on  after surgery cleared to start them. She was extubated and NGT was removed on . She was started on clear liquid diet and tolerating well. She was having Allen's but can be removed and TOV can be tried. Started on home medications for hypertension like atenolol and lisinopril. It is uncontrolled. She is feeling dizzy and abdominal pain. She was given 2 doses of morphine and it is on hold as it might be the reason for lightheadedness.  Follow up with Dr Price for surgery follow up.  Follow up with Cardiologist for Afib.  Follow up with Pain management for left shoulder and bilateral knee joint pain. 89 F PMHx HTN and a significant PSH , Open cholecystectomy, and Hysterectomy presents to ED c/o constant burning abdominal pain , prior BM/flatus but  found to have SBO in ED and new onset rate controlled Afib in the ED - admitted to surgery service for management - underwent  exploratory laparotomy w/ MARIA ISABEL; no bowel resection. NGT placed  c/w on low suction x 24 hours. She was on mechanical ventilation for mild respiratory alkalosis. She was treated with antibiotics, metoprolol and hydralazine. She had Afib but initially she was not on anticoagulants but started on heparin on  after surgery cleared to start them. She was extubated and NGT was removed on . She was started on clear liquid diet and tolerating well. She was having Allen's but can be removed and TOV can be tried. Started on home medications for hypertension like atenolol and lisinopril. It is uncontrolled. She is feeling dizzy and abdominal pain. She was given 2 doses of morphine and it is on hold as it might be the reason for lightheadedness.  Follow up with Dr Price for surgery follow up.  Follow up with Cardiologist for Afib.  Follow up with Pain management for left shoulder and bilateral knee joint pain...

## 2018-11-20 NOTE — PROGRESS NOTE ADULT - SUBJECTIVE AND OBJECTIVE BOX
INTERVAL HPI/OVERNIGHT EVENTS: remained extubated      Antimicrobial:  piperacillin/tazobactam IVPB. 3.375 Gram(s) IV Intermittent every 8 hours    Cardiovascular:  ATENolol  Tablet 25 milliGRAM(s) Oral daily  lisinopril 40 milliGRAM(s) Oral daily    Pulmonary:    Hematalogic:  aspirin  chewable 81 milliGRAM(s) Oral daily  enoxaparin Injectable 60 milliGRAM(s) SubCutaneous daily    Other:  chlorhexidine 4% Liquid 1 Application(s) Topical two times a day  morphine  - Injectable 2 milliGRAM(s) IV Push every 4 hours PRN  pantoprazole  Injectable 40 milliGRAM(s) IV Push daily      Drug Dosing Weight  Height (cm): 167.64 (16 Nov 2018 18:38)  Weight (kg): 58.4 (16 Nov 2018 18:38)  BMI (kg/m2): 20.8 (16 Nov 2018 18:38)  BSA (m2): 1.66 (16 Nov 2018 18:38)    CENTRAL LINE: [ ] YES [ ] NO  LOCATION:   DATE INSERTED:    MESA: [ ] YES [ ] NO    DATE INSERTED:    A-LINE:  [ ] YES [ ] NO  LOCATION:   DATE INSERTED:    PMH/Social Hx/Fam Hx -reviewed admission note, no change since admission  PAST MEDICAL & SURGICAL HISTORY:  Arthritis  HTN (hypertension)      T(C): 36.1 (11-20-18 @ 09:00), Max: 36.8 (11-19-18 @ 15:51)  HR: 65 (11-20-18 @ 12:00)  BP: 163/88 (11-20-18 @ 12:00)  BP(mean): 105 (11-20-18 @ 12:00)  ABP: --  ABP(mean): --  RR: 24 (11-20-18 @ 12:00)  SpO2: 96% (11-20-18 @ 12:00)  Wt(kg): --    ABG - ( 19 Nov 2018 05:04 )  pH, Arterial: 7.43  pH, Blood: x     /  pCO2: 30    /  pO2: 177   / HCO3: 20    / Base Excess: -3.4  /  SaO2: >99                   11-19 @ 07:01  -  11-20 @ 07:00  --------------------------------------------------------  IN: 175 mL / OUT: 670 mL / NET: -495 mL            PHYSICAL EXAM:    GENERAL: No signs of distress, comfortable  HEAD: Atraumatic, Normocephalic  EYES: EOMI, PERRLA  ENMT: No erythema, exudates, or enlargement, Moist mucous membranes  NECK: Supple, normal appearance, No JVD; [  ] central line (if applicable)  CHEST/LUNG: No chest deformity, fair bilateral air entry; No rales, rhonchi, wheezing; crackles  HEART: Regular rate and rhythm; No murmurs, rubs, or gallops;   ABDOMEN: Soft, Nontender, + BS  EXTREMITIES:  + Peripheral Pulses, No clubbing, cyanosis, or edema  NERVOUS SYSTEM: awake and alert x 3, follows commands, upper and lower extremities  LYMPH: No lymphadenopathy noted  SKIN: No rashes or lesions; good turgor, warm, dry      LABS:  CBC Full  -  ( 20 Nov 2018 06:18 )  WBC Count : 10.0 K/uL  Hemoglobin : 12.3 g/dL  Hematocrit : 38.6 %  Platelet Count - Automated : 202 K/uL  Mean Cell Volume : 95.7 fl  Mean Cell Hemoglobin : 30.5 pg  Mean Cell Hemoglobin Concentration : 31.9 gm/dL  Auto Neutrophil # : x  Auto Lymphocyte # : x  Auto Monocyte # : x  Auto Eosinophil # : x  Auto Basophil # : x  Auto Neutrophil % : x  Auto Lymphocyte % : x  Auto Monocyte % : x  Auto Eosinophil % : x  Auto Basophil % : x    11-20    140  |  107  |  29<H>  ----------------------------<  106<H>  3.5   |  21<L>  |  1.12    Ca    8.7      20 Nov 2018 06:18  Phos  3.4     11-20  Mg     2.2     11-20          Culture Results:   No growth (11-17 @ 13:06)      RADIOLOGY & ADDITIONAL STUDIES REVIEWED     CXR:< from: Xray Chest 1 View- PORTABLE-Routine (11.19.18 @ 10:30) >  EXAM:  XR CHEST PORTABLE ROUTINE 1V                            PROCEDURE DATE:  11/19/2018          INTERPRETATION:  CLINICAL STATEMENT: Follow-up chest pain.    TECHNIQUE: AP view of the chest.    COMPARISON: 11/18/2018    FINDINGS/  IMPRESSION:  ET tube, feeding tube, right central line again noted. No pneumothorax.    Increased interstitial lung markings without significant change. Small   left pleural effusion and mild opacity right lung base without   significant change    Heart size cannot be accurately assessed in this projection, but appear   enlarged.    < end of copied text >      IMPRESSION:  PAST MEDICAL & SURGICAL HISTORY:  Arthritis  HTN (hypertension)   p/w     IMP: This is a 89 yr old woman with prior mentioned medical condition presented abdominal burning, vomiting due to SBO. 11/16 EX-LAP with MARIA ISABEL. No bowel resection. Post op Resp Failure requiring vent support.  Post of new on set Afib. sedated. failed SBT/SAT  today. CXR show pul edema and + fluid balance. Surgical f/u noted ..  feeding after flatus  . extubated 11/19.                Plan:      CNS: no sedation    PULMONARY: O2 supp    CARDIAC: continue therapeutic anticoag. I spoke with son regarding anticaog    GI: start clears liquid diet    RENAL:  d/c mesa    SKIN: wound care    MUSCULOSKELETAL: PT/ OOB to chair    ID: re-evaluate antibx    HEME: monitor    DVT and GI Prophylaxis    GOALS OF CARE DISCUSSION WITH PATIENT/FAMILY/PROXY/ son and icu team on rounds

## 2018-11-20 NOTE — PROGRESS NOTE ADULT - ASSESSMENT
89F POD #4 s/p ex lap, MARIA ISABEL. Doing well. Having Bowel function.   - Advance to regular diet  - Transition to PO pain meds  - D/C mesa  - D/C central line  - Physical therapy eval  - Downgrade to the floor  - SANDY/CM   - Dispo planning

## 2018-11-20 NOTE — PROGRESS NOTE ADULT - PROBLEM SELECTOR PLAN 3
CHADSVASC 4; held AC 2/2 recent surgery; no need for rate control medications at this time 2/2 pressor requirements  - TTE G3DD, LAE, and EF grossly WNL  starting heparin drip

## 2018-11-20 NOTE — PROGRESS NOTE ADULT - SUBJECTIVE AND OBJECTIVE BOX
INTERVAL HPI/OVERNIGHT EVENTS: Patient seen and examined at bedside. She is laying on bed comfortably and talking.      PRESSORS: ] NO  WHICH:    ANTIBIOTICS: zosyn                DATE STARTED: 11/16  ANTIBIOTICS:                  DATE STARTED:  ANTIBIOTICS:                  DATE STARTED:    Antimicrobial:  piperacillin/tazobactam IVPB. 3.375 Gram(s) IV Intermittent every 8 hours    Cardiovascular:  ATENolol  Tablet 25 milliGRAM(s) Oral daily  furosemide    Tablet 40 milliGRAM(s) Oral daily  lisinopril 40 milliGRAM(s) Oral daily    Pulmonary:    Hematalogic:  aspirin  chewable 81 milliGRAM(s) Oral daily  heparin  Injectable 5000 Unit(s) SubCutaneous every 8 hours    Other:  chlorhexidine 4% Liquid 1 Application(s) Topical two times a day  morphine  - Injectable 2 milliGRAM(s) IV Push every 4 hours PRN  pantoprazole  Injectable 40 milliGRAM(s) IV Push daily    aspirin  chewable 81 milliGRAM(s) Oral daily  ATENolol  Tablet 25 milliGRAM(s) Oral daily  chlorhexidine 4% Liquid 1 Application(s) Topical two times a day  furosemide    Tablet 40 milliGRAM(s) Oral daily  heparin  Injectable 5000 Unit(s) SubCutaneous every 8 hours  lisinopril 40 milliGRAM(s) Oral daily  morphine  - Injectable 2 milliGRAM(s) IV Push every 4 hours PRN  pantoprazole  Injectable 40 milliGRAM(s) IV Push daily  piperacillin/tazobactam IVPB. 3.375 Gram(s) IV Intermittent every 8 hours    Drug Dosing Weight  Height (cm): 167.64 (16 Nov 2018 18:38)  Weight (kg): 58.4 (16 Nov 2018 18:38)  BMI (kg/m2): 20.8 (16 Nov 2018 18:38)  BSA (m2): 1.66 (16 Nov 2018 18:38)    CENTRAL LINE: [ ] YES [ ]  LOCATION: [facility]   DATE INSERTED:11/16      ROSADO: [ ] YES [      A-LINE:  NO     PMH -reviewed admission note, no change since admission  Heart faliure: acute [ ] chronic [ ] acute or chronic [ ] diastolic [ ] systolic [ ] combied systolic and diastolic[ ]  MARLEN: ATN[ ] renal medullary necrosis [ ] CKD I [ ]CKDII [ ]CKD III [ ]CKD IV [ ]CKD V [ ]Other pathological lesions [ ]  Abdominal Nutrition Status: malnutrition [ ] cachexia [ ] morbid obesity/BMI=40 [ ] Supplement ordered [___________]     ICU Vital Signs Last 24 Hrs  T(C): 35.8 (20 Nov 2018 05:00), Max: 36.8 (19 Nov 2018 15:51)  T(F): 96.4 (20 Nov 2018 05:00), Max: 98.3 (19 Nov 2018 15:51)  HR: 68 (20 Nov 2018 07:00) (57 - 85)  BP: 172/82 (20 Nov 2018 07:00) (122/82 - 192/104)  BP(mean): 105 (20 Nov 2018 07:00) (80 - 126)  ABP: --  ABP(mean): --  RR: 24 (20 Nov 2018 07:00) (12 - 30)  SpO2: 93% (20 Nov 2018 07:00) (86% - 100%)      ABG - ( 19 Nov 2018 05:04 )  pH, Arterial: 7.43  pH, Blood: x     /  pCO2: 30    /  pO2: 177   / HCO3: 20    / Base Excess: -3.4  /  SaO2: >99                   11-19 @ 07:01  -  11-20 @ 07:00  --------------------------------------------------------  IN: 175 mL / OUT: 670 mL / NET: -495 mL        Mode: CPAP with PS  RR (machine):   TV (machine):   FiO2: 40  PEEP: 5  PS: 7  ITime: 1  MAP: 7  PIP: 12      PHYSICAL EXAM:    GENERAL: [ ]NAD, [ ]well-groomed, [ ]well-developed  HEAD:  [ ]Atraumatic, [ ]Normocephalic  EYES: [ ]EOMI, [ ]PERRLA, [ ]conjunctiva and sclera clear  ENMT: [ ]No tonsillar erythema, exudates, or enlargement; [ ]Moist mucous membranes, [ ]Good dentition, [ ]No lesions  NECK: [ ]Supple, normal appearance, [ ]No JVD; [ ]Normal thyroid; [ ]Trachea midline  NERVOUS SYSTEM:  [ ]Alert & Oriented X3, [ ]Good concentration; [ ]Motor Strength 5/5 B/L upper and lower extremities; [ ]DTRs 2+ intact and symmetric  CHEST/LUNG: [ ]No chest deformity; [ ]Normal percussion bilaterally; [ ]No rales, rhonchi, wheezing   HEART: [ ]Regular rate and rhythm; [ ]No murmurs, rubs, or gallops  ABDOMEN: [ ]Soft, tender near the wound site, Nondistended; [ ]Bowel sounds present  EXTREMITIES:  [ ]2+ Peripheral Pulses, [ ]No clubbing, cyanosis, or edema  LYMPH: [ ]No lymphadenopathy noted  SKIN: [ ]No rashes or lesions; [ ]Good capillary refill      LABS:  CBC Full  -  ( 20 Nov 2018 06:18 )  WBC Count : 10.0 K/uL  Hemoglobin : 12.3 g/dL  Hematocrit : 38.6 %  Platelet Count - Automated : 202 K/uL  Mean Cell Volume : 95.7 fl  Mean Cell Hemoglobin : 30.5 pg  Mean Cell Hemoglobin Concentration : 31.9 gm/dL  Auto Neutrophil # : x  Auto Lymphocyte # : x  Auto Monocyte # : x  Auto Eosinophil # : x  Auto Basophil # : x  Auto Neutrophil % : x  Auto Lymphocyte % : x  Auto Monocyte % : x  Auto Eosinophil % : x  Auto Basophil % : x    11-20    140  |  107  |  29<H>  ----------------------------<  106<H>  3.5   |  21<L>  |  1.12    Ca    8.7      20 Nov 2018 06:18  Phos  3.4     11-20  Mg     2.2     11-20          Culture Results:   No growth (11-17 @ 13:06)      RADIOLOGY & ADDITIONAL STUDIES REVIEWED:  ***    [ ]GOALS OF CARE DISCUSSION WITH PATIENT/FAMILY/PROXY:    CRITICAL CARE TIME SPENT: 35 minutes INTERVAL HPI/OVERNIGHT EVENTS: Patient seen and examined at bedside. She is laying on bed comfortably and talking. Right internal jugular line was removed on 11/20 after explaining the patient about risks and benefits at the bedside. Sterile technique was used and we cleaned the site with chlorhexidene. No bleeding from the removal site. Pressure was applied with gauze piece and plaster was placed.        PRESSORS: ] NO  WHICH:    ANTIBIOTICS: zosyn                DATE STARTED: 11/16  ANTIBIOTICS:                  DATE STARTED:  ANTIBIOTICS:                  DATE STARTED:    Antimicrobial:  piperacillin/tazobactam IVPB. 3.375 Gram(s) IV Intermittent every 8 hours    Cardiovascular:  ATENolol  Tablet 25 milliGRAM(s) Oral daily  furosemide    Tablet 40 milliGRAM(s) Oral daily  lisinopril 40 milliGRAM(s) Oral daily    Pulmonary:    Hematalogic:  aspirin  chewable 81 milliGRAM(s) Oral daily  heparin  Injectable 5000 Unit(s) SubCutaneous every 8 hours    Other:  chlorhexidine 4% Liquid 1 Application(s) Topical two times a day  morphine  - Injectable 2 milliGRAM(s) IV Push every 4 hours PRN  pantoprazole  Injectable 40 milliGRAM(s) IV Push daily    aspirin  chewable 81 milliGRAM(s) Oral daily  ATENolol  Tablet 25 milliGRAM(s) Oral daily  chlorhexidine 4% Liquid 1 Application(s) Topical two times a day  furosemide    Tablet 40 milliGRAM(s) Oral daily  heparin  Injectable 5000 Unit(s) SubCutaneous every 8 hours  lisinopril 40 milliGRAM(s) Oral daily  morphine  - Injectable 2 milliGRAM(s) IV Push every 4 hours PRN  pantoprazole  Injectable 40 milliGRAM(s) IV Push daily  piperacillin/tazobactam IVPB. 3.375 Gram(s) IV Intermittent every 8 hours    Drug Dosing Weight  Height (cm): 167.64 (16 Nov 2018 18:38)  Weight (kg): 58.4 (16 Nov 2018 18:38)  BMI (kg/m2): 20.8 (16 Nov 2018 18:38)  BSA (m2): 1.66 (16 Nov 2018 18:38)    CENTRAL LINE: [ ] YES [ ]  LOCATION: [facility]   DATE INSERTED:11/16      ROSADO: [ ] YES [      A-LINE:  NO     PMH -reviewed admission note, no change since admission  Heart faliure: acute [ ] chronic [ ] acute or chronic [ ] diastolic [ ] systolic [ ] combied systolic and diastolic[ ]  MARLEN: ATN[ ] renal medullary necrosis [ ] CKD I [ ]CKDII [ ]CKD III [ ]CKD IV [ ]CKD V [ ]Other pathological lesions [ ]  Abdominal Nutrition Status: malnutrition [ ] cachexia [ ] morbid obesity/BMI=40 [ ] Supplement ordered [___________]     ICU Vital Signs Last 24 Hrs  T(C): 35.8 (20 Nov 2018 05:00), Max: 36.8 (19 Nov 2018 15:51)  T(F): 96.4 (20 Nov 2018 05:00), Max: 98.3 (19 Nov 2018 15:51)  HR: 68 (20 Nov 2018 07:00) (57 - 85)  BP: 172/82 (20 Nov 2018 07:00) (122/82 - 192/104)  BP(mean): 105 (20 Nov 2018 07:00) (80 - 126)  ABP: --  ABP(mean): --  RR: 24 (20 Nov 2018 07:00) (12 - 30)  SpO2: 93% (20 Nov 2018 07:00) (86% - 100%)      ABG - ( 19 Nov 2018 05:04 )  pH, Arterial: 7.43  pH, Blood: x     /  pCO2: 30    /  pO2: 177   / HCO3: 20    / Base Excess: -3.4  /  SaO2: >99                   11-19 @ 07:01  -  11-20 @ 07:00  --------------------------------------------------------  IN: 175 mL / OUT: 670 mL / NET: -495 mL        Mode: CPAP with PS  RR (machine):   TV (machine):   FiO2: 40  PEEP: 5  PS: 7  ITime: 1  MAP: 7  PIP: 12      PHYSICAL EXAM:    GENERAL: [ ]NAD, [ ]well-groomed, [ ]well-developed  HEAD:  [ ]Atraumatic, [ ]Normocephalic  EYES: [ ]EOMI, [ ]PERRLA, [ ]conjunctiva and sclera clear  ENMT: [ ]No tonsillar erythema, exudates, or enlargement; [ ]Moist mucous membranes, [ ]Good dentition, [ ]No lesions  NECK: [ ]Supple, normal appearance, [ ]No JVD; [ ]Normal thyroid; [ ]Trachea midline  NERVOUS SYSTEM:  [ ]Alert & Oriented X3, [ ]Good concentration; [ ]Motor Strength 5/5 B/L upper and lower extremities; [ ]DTRs 2+ intact and symmetric  CHEST/LUNG: [ ]No chest deformity; [ ]Normal percussion bilaterally; [ ]No rales, rhonchi, wheezing   HEART: [ ]Regular rate and rhythm; [ ]No murmurs, rubs, or gallops  ABDOMEN: [ ]Soft, tender near the wound site, Nondistended; [ ]Bowel sounds present  EXTREMITIES:  [ ]2+ Peripheral Pulses, [ ]No clubbing, cyanosis, or edema  LYMPH: [ ]No lymphadenopathy noted  SKIN: [ ]No rashes or lesions; [ ]Good capillary refill      LABS:  CBC Full  -  ( 20 Nov 2018 06:18 )  WBC Count : 10.0 K/uL  Hemoglobin : 12.3 g/dL  Hematocrit : 38.6 %  Platelet Count - Automated : 202 K/uL  Mean Cell Volume : 95.7 fl  Mean Cell Hemoglobin : 30.5 pg  Mean Cell Hemoglobin Concentration : 31.9 gm/dL  Auto Neutrophil # : x  Auto Lymphocyte # : x  Auto Monocyte # : x  Auto Eosinophil # : x  Auto Basophil # : x  Auto Neutrophil % : x  Auto Lymphocyte % : x  Auto Monocyte % : x  Auto Eosinophil % : x  Auto Basophil % : x    11-20    140  |  107  |  29<H>  ----------------------------<  106<H>  3.5   |  21<L>  |  1.12    Ca    8.7      20 Nov 2018 06:18  Phos  3.4     11-20  Mg     2.2     11-20          Culture Results:   No growth (11-17 @ 13:06)      RADIOLOGY & ADDITIONAL STUDIES REVIEWED:  ***    [ ]GOALS OF CARE DISCUSSION WITH PATIENT/FAMILY/PROXY:    CRITICAL CARE TIME SPENT: 35 minutes

## 2018-11-20 NOTE — PHYSICAL THERAPY INITIAL EVALUATION ADULT - CRITERIA FOR SKILLED THERAPEUTIC INTERVENTIONS
anticipated discharge recommendation/rehab potential/predicted duration of therapy intervention/impairments found/therapy frequency

## 2018-11-20 NOTE — PHYSICAL THERAPY INITIAL EVALUATION ADULT - ADDITIONAL COMMENTS
patient uses cane, R.W. as needed. As per son patient only goes out for doctor's appt. Stair negotiation is very very slow.  Family is plannng to install chair lift

## 2018-11-21 ENCOUNTER — TRANSCRIPTION ENCOUNTER (OUTPATIENT)
Age: 83
End: 2018-11-21

## 2018-11-21 VITALS — HEART RATE: 82 BPM | DIASTOLIC BLOOD PRESSURE: 75 MMHG | SYSTOLIC BLOOD PRESSURE: 135 MMHG

## 2018-11-21 LAB
ANION GAP SERPL CALC-SCNC: 13 MMOL/L — SIGNIFICANT CHANGE UP (ref 5–17)
BUN SERPL-MCNC: 24 MG/DL — HIGH (ref 7–18)
CALCIUM SERPL-MCNC: 8.7 MG/DL — SIGNIFICANT CHANGE UP (ref 8.4–10.5)
CHLORIDE SERPL-SCNC: 105 MMOL/L — SIGNIFICANT CHANGE UP (ref 96–108)
CO2 SERPL-SCNC: 20 MMOL/L — LOW (ref 22–31)
CREAT SERPL-MCNC: 1 MG/DL — SIGNIFICANT CHANGE UP (ref 0.5–1.3)
GLUCOSE SERPL-MCNC: 142 MG/DL — HIGH (ref 70–99)
HCT VFR BLD CALC: 38.7 % — SIGNIFICANT CHANGE UP (ref 34.5–45)
HGB BLD-MCNC: 12.6 G/DL — SIGNIFICANT CHANGE UP (ref 11.5–15.5)
LYMPHOCYTES # BLD AUTO: 12 % — LOW (ref 13–44)
MAGNESIUM SERPL-MCNC: 2.1 MG/DL — SIGNIFICANT CHANGE UP (ref 1.6–2.6)
MCHC RBC-ENTMCNC: 30.6 PG — SIGNIFICANT CHANGE UP (ref 27–34)
MCHC RBC-ENTMCNC: 32.6 GM/DL — SIGNIFICANT CHANGE UP (ref 32–36)
MCV RBC AUTO: 93.8 FL — SIGNIFICANT CHANGE UP (ref 80–100)
MONOCYTES NFR BLD AUTO: 25 % — HIGH (ref 2–14)
NEUTROPHILS NFR BLD AUTO: 60 % — SIGNIFICANT CHANGE UP (ref 43–77)
PHOSPHATE SERPL-MCNC: 2.9 MG/DL — SIGNIFICANT CHANGE UP (ref 2.5–4.5)
PLATELET # BLD AUTO: 202 K/UL — SIGNIFICANT CHANGE UP (ref 150–400)
POTASSIUM SERPL-MCNC: 3.3 MMOL/L — LOW (ref 3.5–5.3)
POTASSIUM SERPL-SCNC: 3.3 MMOL/L — LOW (ref 3.5–5.3)
RBC # BLD: 4.13 M/UL — SIGNIFICANT CHANGE UP (ref 3.8–5.2)
RBC # FLD: 13.4 % — SIGNIFICANT CHANGE UP (ref 10.3–14.5)
SODIUM SERPL-SCNC: 138 MMOL/L — SIGNIFICANT CHANGE UP (ref 135–145)
WBC # BLD: 8.6 K/UL — SIGNIFICANT CHANGE UP (ref 3.8–10.5)
WBC # FLD AUTO: 8.6 K/UL — SIGNIFICANT CHANGE UP (ref 3.8–10.5)

## 2018-11-21 PROCEDURE — 82803 BLOOD GASES ANY COMBINATION: CPT

## 2018-11-21 PROCEDURE — 85027 COMPLETE CBC AUTOMATED: CPT

## 2018-11-21 PROCEDURE — 97530 THERAPEUTIC ACTIVITIES: CPT

## 2018-11-21 PROCEDURE — 86901 BLOOD TYPING SEROLOGIC RH(D): CPT

## 2018-11-21 PROCEDURE — 94003 VENT MGMT INPAT SUBQ DAY: CPT

## 2018-11-21 PROCEDURE — 99233 SBSQ HOSP IP/OBS HIGH 50: CPT

## 2018-11-21 PROCEDURE — 71045 X-RAY EXAM CHEST 1 VIEW: CPT

## 2018-11-21 PROCEDURE — 36415 COLL VENOUS BLD VENIPUNCTURE: CPT

## 2018-11-21 PROCEDURE — 84100 ASSAY OF PHOSPHORUS: CPT

## 2018-11-21 PROCEDURE — 93306 TTE W/DOPPLER COMPLETE: CPT

## 2018-11-21 PROCEDURE — 80053 COMPREHEN METABOLIC PANEL: CPT

## 2018-11-21 PROCEDURE — 86900 BLOOD TYPING SEROLOGIC ABO: CPT

## 2018-11-21 PROCEDURE — 94002 VENT MGMT INPAT INIT DAY: CPT

## 2018-11-21 PROCEDURE — 93970 EXTREMITY STUDY: CPT

## 2018-11-21 PROCEDURE — 97116 GAIT TRAINING THERAPY: CPT

## 2018-11-21 PROCEDURE — 83605 ASSAY OF LACTIC ACID: CPT

## 2018-11-21 PROCEDURE — 83880 ASSAY OF NATRIURETIC PEPTIDE: CPT

## 2018-11-21 PROCEDURE — 87086 URINE CULTURE/COLONY COUNT: CPT

## 2018-11-21 PROCEDURE — 83735 ASSAY OF MAGNESIUM: CPT

## 2018-11-21 PROCEDURE — 82962 GLUCOSE BLOOD TEST: CPT

## 2018-11-21 PROCEDURE — 84484 ASSAY OF TROPONIN QUANT: CPT

## 2018-11-21 PROCEDURE — 83690 ASSAY OF LIPASE: CPT

## 2018-11-21 PROCEDURE — 74177 CT ABD & PELVIS W/CONTRAST: CPT

## 2018-11-21 PROCEDURE — 86850 RBC ANTIBODY SCREEN: CPT

## 2018-11-21 PROCEDURE — 99285 EMERGENCY DEPT VISIT HI MDM: CPT | Mod: 25

## 2018-11-21 PROCEDURE — 85730 THROMBOPLASTIN TIME PARTIAL: CPT

## 2018-11-21 PROCEDURE — 93005 ELECTROCARDIOGRAM TRACING: CPT

## 2018-11-21 PROCEDURE — 87070 CULTURE OTHR SPECIMN AEROBIC: CPT

## 2018-11-21 PROCEDURE — 96375 TX/PRO/DX INJ NEW DRUG ADDON: CPT

## 2018-11-21 PROCEDURE — 96374 THER/PROPH/DIAG INJ IV PUSH: CPT

## 2018-11-21 PROCEDURE — 82550 ASSAY OF CK (CPK): CPT

## 2018-11-21 PROCEDURE — 82553 CREATINE MB FRACTION: CPT

## 2018-11-21 PROCEDURE — 97110 THERAPEUTIC EXERCISES: CPT

## 2018-11-21 PROCEDURE — 80048 BASIC METABOLIC PNL TOTAL CA: CPT

## 2018-11-21 PROCEDURE — 81001 URINALYSIS AUTO W/SCOPE: CPT

## 2018-11-21 RX ORDER — POTASSIUM CHLORIDE 20 MEQ
40 PACKET (EA) ORAL ONCE
Qty: 0 | Refills: 0 | Status: DISCONTINUED | OUTPATIENT
Start: 2018-11-21 | End: 2018-11-21

## 2018-11-21 RX ORDER — SODIUM,POTASSIUM PHOSPHATES 278-250MG
2 POWDER IN PACKET (EA) ORAL ONCE
Qty: 0 | Refills: 0 | Status: DISCONTINUED | OUTPATIENT
Start: 2018-11-21 | End: 2018-11-21

## 2018-11-21 RX ORDER — APIXABAN 2.5 MG/1
1 TABLET, FILM COATED ORAL
Qty: 60 | Refills: 0 | OUTPATIENT
Start: 2018-11-21

## 2018-11-21 RX ORDER — ACETAMINOPHEN 500 MG
1000 TABLET ORAL ONCE
Qty: 0 | Refills: 0 | Status: COMPLETED | OUTPATIENT
Start: 2018-11-21 | End: 2018-11-21

## 2018-11-21 RX ADMIN — Medication 650 MILLIGRAM(S): at 09:32

## 2018-11-21 RX ADMIN — PIPERACILLIN AND TAZOBACTAM 25 GRAM(S): 4; .5 INJECTION, POWDER, LYOPHILIZED, FOR SOLUTION INTRAVENOUS at 07:47

## 2018-11-21 RX ADMIN — LISINOPRIL 40 MILLIGRAM(S): 2.5 TABLET ORAL at 07:47

## 2018-11-21 RX ADMIN — Medication 1000 MILLIGRAM(S): at 11:22

## 2018-11-21 RX ADMIN — Medication 650 MILLIGRAM(S): at 00:34

## 2018-11-21 RX ADMIN — Medication 81 MILLIGRAM(S): at 12:04

## 2018-11-21 RX ADMIN — Medication 650 MILLIGRAM(S): at 08:27

## 2018-11-21 RX ADMIN — Medication 650 MILLIGRAM(S): at 02:10

## 2018-11-21 RX ADMIN — Medication 400 MILLIGRAM(S): at 10:53

## 2018-11-21 RX ADMIN — LIDOCAINE 2 PATCH: 4 CREAM TOPICAL at 06:24

## 2018-11-21 NOTE — DISCHARGE NOTE ADULT - PATIENT PORTAL LINK FT
You can access the [facility] Portal, offered by [facility], by registering with the following website: [email or web address]

## 2018-11-21 NOTE — DISCHARGE NOTE ADULT - NSFTFSERV2RD_GEN_ALL_CORE
Addended by: ALEXANDRA HALL on: 9/27/2018 03:35 PM     Modules accepted: Orders     Physical Therapy/Nursing

## 2018-11-21 NOTE — DISCHARGE NOTE ADULT - MEDICATION SUMMARY - MEDICATIONS TO TAKE
I will START or STAY ON the medications listed below when I get home from the hospital:    oxyCODONE-acetaminophen 5 mg-325 mg oral tablet  -- 1 tab(s) by mouth every 6 hours, As Needed -for moderate pain MDD:4 tabs   -- Caution federal law prohibits the transfer of this drug to any person other  than the person for whom it was prescribed.  May cause drowsiness.  Alcohol may intensify this effect.  Use care when operating dangerous machinery.  This prescription cannot be refilled.  This product contains acetaminophen.  Do not use  with any other product containing acetaminophen to prevent possible liver damage.  Using more of this medication than prescribed may cause serious breathing problems.    -- Indication: For Prn pain     lisinopril 20 mg oral tablet  -- 1 tab(s) by mouth once a day  -- Indication: For HTN (hypertension)    Eliquis 2.5 mg oral tablet  -- 1 tab(s) by mouth 2 times a day   -- Check with your doctor before becoming pregnant.  It is very important that you take or use this exactly as directed.  Do not skip doses or discontinue unless directed by your doctor.  Obtain medical advice before taking any non-prescription drugs as some may affect the action of this medication.    -- Indication: For Afib     atenolol 25 mg oral tablet  -- 1 tab(s) by mouth once a day  -- Indication: For HTN (hypertension)    furosemide 40 mg oral tablet  -- 1 tab(s) by mouth once a day  -- Indication: For diuresis     potassium chloride 10 mEq oral capsule, extended release  -- 1 tab(s) by mouth once a day  -- Indication: For Hypokalemia

## 2018-11-21 NOTE — PROGRESS NOTE ADULT - SUBJECTIVE AND OBJECTIVE BOX
NP Note discussed with  Primary Attending    Patient is a 89y old  Female who presents with a chief complaint of sbo (21 Nov 2018 11:43).  Pt s/p exp lap, ally,.  Pt was downgraded to floor.  Pt with history of arthritis and has bilateral chronic knee pain.  Pt lying in bed, nad.  Pt son is by beside and translating.  Pt denies bilateral knee pain at this time.  No nausea or vomiting.        INTERVAL HPI/OVERNIGHT EVENTS: no new complaints    MEDICATIONS  (STANDING):  aspirin  chewable 81 milliGRAM(s) Oral daily  ATENolol  Tablet 25 milliGRAM(s) Oral daily  chlorhexidine 4% Liquid 1 Application(s) Topical two times a day  enoxaparin Injectable 60 milliGRAM(s) SubCutaneous daily  gabapentin 100 milliGRAM(s) Oral at bedtime  lidocaine   Patch 2 Patch Transdermal daily  lisinopril 40 milliGRAM(s) Oral daily  pantoprazole  Injectable 40 milliGRAM(s) IV Push daily  piperacillin/tazobactam IVPB. 3.375 Gram(s) IV Intermittent every 8 hours  potassium chloride    Tablet ER 40 milliEquivalent(s) Oral once    MEDICATIONS  (PRN):  acetaminophen   Tablet .. 650 milliGRAM(s) Oral every 6 hours PRN Severe Pain (7 - 10)      __________________________________________________  REVIEW OF SYSTEMS:    CONSTITUTIONAL: No fever,   RESPIRATORY: No cough; No shortness of breath  CARDIOVASCULAR: No chest pain, no palpitations  GASTROINTESTINAL: + abdominal pain. No nausea or vomiting; No diarrhea   NEUROLOGICAL: No headache or numbness, no tremors  MUSCULOSKELETAL: + bilateral knee pain  GENITOURINARY: no dysuria, no frequency, no hesitancy        Vital Signs Last 24 Hrs  T(C): 36.8 (21 Nov 2018 05:25), Max: 36.8 (21 Nov 2018 05:25)  T(F): 98.3 (21 Nov 2018 05:25), Max: 98.3 (21 Nov 2018 05:25)  HR: 59 (21 Nov 2018 05:25) (59 - 82)  BP: 127/76 (21 Nov 2018 05:25) (127/76 - 171/66)  BP(mean): 96 (20 Nov 2018 21:00) (94 - 118)  RR: 16 (21 Nov 2018 05:25) (14 - 25)  SpO2: 99% (21 Nov 2018 05:25) (94% - 99%)    ________________________________________________  PHYSICAL EXAM:  GENERAL: NAD  CHEST/LUNG: Clear to auscultation bilaterally with good air entry   HEART: S1 S2  regular; no murmurs, gallops or rubs  ABDOMEN: distended, tender  NERVOUS SYSTEM:  Awake and alert; Oriented  to place, person and time ; no new deficits  MUSCULOSKELETAL: + decreased rom, no knee pain at this time  _________________________________________________  LABS:                        12.6   8.6   )-----------( 202      ( 21 Nov 2018 06:57 )             38.7     11-21    138  |  105  |  24<H>  ----------------------------<  142<H>  3.3<L>   |  20<L>  |  1.00    Ca    8.7      21 Nov 2018 06:57  Phos  2.9     11-21  Mg     2.1     11-21          CAPILLARY BLOOD GLUCOSE            RADIOLOGY & ADDITIONAL TESTS:    Imaging Personally Reviewed:  YES/NO    Consultant(s) Notes Reviewed:   YES/ No    Care Discussed with Consultants :     Plan of care was discussed with patient and /or primary care giver; all questions and concerns were addressed and care was aligned with patient's wishes.

## 2018-11-21 NOTE — PROGRESS NOTE ADULT - PROBLEM SELECTOR PROBLEM 4
S/P exploratory laparotomy
Prophylactic measure
Hypertension
Prophylactic measure
Prophylactic measure

## 2018-11-21 NOTE — DISCHARGE NOTE ADULT - PLAN OF CARE
Wound healing Please follow up with Dr. Leonard within 1 week   Diet as tolerated   Wound: intermittent packing to be packed daily with half inch packing and covered with dry dressing Please follow up with Dr. Chambers within 1 week for further management of Afib [address] [place] [address] [place] [address]  Tel:   [place] Office : [address] [facility] [place] [address]  Tel: [phone]

## 2018-11-21 NOTE — DISCHARGE NOTE ADULT - CARE PROVIDER_API CALL
Ventura Leonard), ColonRectal Surgery; Surgery  [address] [place] [address]  Phone: [phone]  Fax: [phone]

## 2018-11-21 NOTE — PROGRESS NOTE ADULT - SUBJECTIVE AND OBJECTIVE BOX
Patient seen and examined.     MEDICATIONS  (STANDING):  aspirin  chewable 81 milliGRAM(s) Oral daily  ATENolol  Tablet 25 milliGRAM(s) Oral daily  chlorhexidine 4% Liquid 1 Application(s) Topical two times a day  enoxaparin Injectable 60 milliGRAM(s) SubCutaneous daily  gabapentin 100 milliGRAM(s) Oral at bedtime  lidocaine   Patch 2 Patch Transdermal daily  lisinopril 40 milliGRAM(s) Oral daily  pantoprazole  Injectable 40 milliGRAM(s) IV Push daily  piperacillin/tazobactam IVPB. 3.375 Gram(s) IV Intermittent every 8 hours  potassium chloride    Tablet ER 40 milliEquivalent(s) Oral once      MEDICATIONS  (PRN):  acetaminophen   Tablet .. 650 milliGRAM(s) Oral every 6 hours PRN Severe Pain (7 - 10)     Medications up to date at time of exam.    PHYSICAL EXAMINATION:  Patient has no new complaints.  GENERAL: The patient is a well-developed, well-nourished, in no apparent distress.     Vital Signs Last 24 Hrs  T(C): 36.8 (21 Nov 2018 05:25), Max: 36.8 (21 Nov 2018 05:25)  T(F): 98.3 (21 Nov 2018 05:25), Max: 98.3 (21 Nov 2018 05:25)  HR: 59 (21 Nov 2018 05:25) (59 - 82)  BP: 127/76 (21 Nov 2018 05:25) (127/76 - 171/66)  BP(mean): 96 (20 Nov 2018 21:00) (94 - 118)  RR: 16 (21 Nov 2018 05:25) (14 - 25)  SpO2: 99% (21 Nov 2018 05:25) (94% - 99%)   (if applicable)    Chest Tube (if applicable)    HEENT: Head is normocephalic and atraumatic.     NECK: Supple, no palpable adenopathy.    LUNGS: Clear to auscultation, no wheezing, rales, or rhonchi.    HEART: Regular rate and rhythm without murmur.    ABDOMEN: Soft, nontender, and nondistended.  +dsg     EXTREMITIES: Without any cyanosis, clubbing, rash, lesions or edema.    NEUROLOGIC: Awake, alert.    SKIN: Warm, dry, good turgor.    LABS:                        12.6   8.6   )-----------( 202      ( 21 Nov 2018 06:57 )             38.7     11-21    138  |  105  |  24<H>  ----------------------------<  142<H>  3.3<L>   |  20<L>  |  1.00    Ca    8.7      21 Nov 2018 06:57  Phos  2.9     11-21  Mg     2.1     11-21            MICROBIOLOGY: (if applicable)    RADIOLOGY & ADDITIONAL STUDIES:  EKG:   CXR:  ECHO:    IMPRESSION: 89y Female PAST MEDICAL & SURGICAL HISTORY:  Arthritis  HTN (hypertension)     IMP: This is a 89 yr old woman with prior mentioned medical condition presented abdominal burning, vomiting due to SBO. 11/16 EX-LAP with MARIA ISABEL. No bowel resection. Post op Resp Failure requiring vent support.    small L effusion on CXR 11/19  +pulm HTN, mild    SUGGESTION:     - surgery f/u   - con't with antibiotics as per ID recommendations.    - repeat CXR (ordered)   - incentive spirometry   - pain mgmt   - OOB as tolerated   - DVT and GI prophylaxis.    - plan of care discussed with tab Campos at bedside Patient seen and examined.     MEDICATIONS  (STANDING):  aspirin  chewable 81 milliGRAM(s) Oral daily  ATENolol  Tablet 25 milliGRAM(s) Oral daily  chlorhexidine 4% Liquid 1 Application(s) Topical two times a day  enoxaparin Injectable 60 milliGRAM(s) SubCutaneous daily  gabapentin 100 milliGRAM(s) Oral at bedtime  lidocaine   Patch 2 Patch Transdermal daily  lisinopril 40 milliGRAM(s) Oral daily  pantoprazole  Injectable 40 milliGRAM(s) IV Push daily  piperacillin/tazobactam IVPB. 3.375 Gram(s) IV Intermittent every 8 hours  potassium chloride    Tablet ER 40 milliEquivalent(s) Oral once      MEDICATIONS  (PRN):  acetaminophen   Tablet .. 650 milliGRAM(s) Oral every 6 hours PRN Severe Pain (7 - 10)     Medications up to date at time of exam.    PHYSICAL EXAMINATION:  Patient has no new complaints.  GENERAL: The patient is a well-developed, well-nourished, in no apparent distress.     Vital Signs Last 24 Hrs  T(C): 36.8 (21 Nov 2018 05:25), Max: 36.8 (21 Nov 2018 05:25)  T(F): 98.3 (21 Nov 2018 05:25), Max: 98.3 (21 Nov 2018 05:25)  HR: 59 (21 Nov 2018 05:25) (59 - 82)  BP: 127/76 (21 Nov 2018 05:25) (127/76 - 171/66)  BP(mean): 96 (20 Nov 2018 21:00) (94 - 118)  RR: 16 (21 Nov 2018 05:25) (14 - 25)  SpO2: 99% (21 Nov 2018 05:25) (94% - 99%)   (if applicable)    Chest Tube (if applicable)    HEENT: Head is normocephalic and atraumatic.     NECK: Supple, no palpable adenopathy.    LUNGS: Clear to auscultation, no wheezing, rales, or rhonchi.    HEART: Regular rate and rhythm without murmur.    ABDOMEN: Soft, nontender, and nondistended.  +dsg     EXTREMITIES: Without any cyanosis, clubbing, rash, lesions or edema.    NEUROLOGIC: Awake, alert.    SKIN: Warm, dry, good turgor.    LABS:                        12.6   8.6   )-----------( 202      ( 21 Nov 2018 06:57 )             38.7     11-21    138  |  105  |  24<H>  ----------------------------<  142<H>  3.3<L>   |  20<L>  |  1.00    Ca    8.7      21 Nov 2018 06:57  Phos  2.9     11-21  Mg     2.1     11-21            MICROBIOLOGY: (if applicable)    RADIOLOGY & ADDITIONAL STUDIES:  EKG:   CXR:  ECHO:    IMPRESSION: 89y Female PAST MEDICAL & SURGICAL HISTORY:  Arthritis  HTN (hypertension)     IMP: This is a 89 yr old woman with prior mentioned medical condition presented abdominal burning, vomiting due to SBO. 11/16 EX-LAP with MARIA ISABEL. No bowel resection. Post op Resp Failure requiring vent support.    small L effusion on CXR 11/19  +pulm HTN, mild    SUGGESTION:     - surgery f/u   - con't with antibiotics as per ID recommendations.    - repeat CXR (ordered)   - incentive spirometry   - pain mgmt   - OOB as tolerated   - DVT and GI prophylaxis.    - plan of care discussed with tab Campos at bedside    Agree with above assessment and plan as transcribed.

## 2018-11-21 NOTE — PROGRESS NOTE ADULT - PROBLEM SELECTOR PROBLEM 3
Chronic pain of both shoulders
Atrial fibrillation

## 2018-11-21 NOTE — PROGRESS NOTE ADULT - PROBLEM SELECTOR PROBLEM 2
Arthritis
Troponin level elevated

## 2018-11-21 NOTE — DISCHARGE NOTE ADULT - HOSPITAL COURSE
89 F PMHx HTN and a significant PSH , Open cholecystectomy, and Hysterectomy presents to ED c/o constant burning abdominal pain , prior BM/flatus but  found to have SBO in ED and new onset rate controlled Afib in the ED - admitted to surgery service for management - underwent  exploratory laparotomy w/ MARIA ISABEL; no bowel resection. NGT placed  c/w on low suction x 24 hours. She was on mechanical ventilation for mild respiratory alkalosis. She was treated with antibiotics, metoprolol and hydralazine. She had Afib but initially she was not on anticoagulants but started on heparin on  after surgery cleared to start them. She was extubated and NGT was removed on . She was started on clear liquid diet and tolerating well. She was having Allen's but can be removed and TOV can be tried. Started on home medications for hypertension like atenolol and lisinopril. It is uncontrolled. She is feeling dizzy and abdominal pain. She was given 2 doses of morphine and it is on hold as it might be the reason for lightheadedness. 89 F PMHx HTN and a significant PSH , Open cholecystectomy, and Hysterectomy presents to ED c/o constant burning abdominal pain , prior BM/flatus but  found to have SBO in ED and new onset rate controlled Afib in the ED - admitted to surgery service for management - underwent  exploratory laparotomy w/ MARIA ISABEL; no bowel resection. NGT placed  c/w on low suction x 24 hours. She was on mechanical ventilation for mild respiratory alkalosis. She was treated with antibiotics, metoprolol and hydralazine. She had Afib but initially she was not on anticoagulants but started on heparin on  after surgery cleared to start them. She was extubated and NGT was removed on . She was started on clear liquid diet and tolerating well. Patient had return of bowel function and diet was advanced and tolerated

## 2018-11-21 NOTE — PROGRESS NOTE ADULT - ATTENDING COMMENTS
Patient able to be d/c home with VNS for the wound.  To check with medicine prior to d/c regarding anticoagulation for the A-fib.
agree with above.  Pulmonary toileting.  Await flatus prior to start diet.  OOB to chair.
Please refer to my note from today.
Please refer to my note from today.
Pt seen and examined     Cpap trail initiated  will start precedex    Assessment  Acute resp failure post op\  Septic shock on pressers low dose  SBO 0 s/o MARIA ISABEL  possible NSTEMI  h/o cholycstectomy, hysterectomy c-sections    Plan  D/W Cardio bedside  Cpap trial  wean if tolerated  NPO  NGT to suction  surg f/u  mesa  ivf  supportive care.
Please refer to my note from today.

## 2018-11-21 NOTE — PROGRESS NOTE ADULT - PROBLEM SELECTOR PLAN 1
- pt refusing iv tylenol  - po tylenol  - lidocaine patches  - - pt refusing iv tylenol  - po tylenol  - lidocaine patches  - gabapentin 100mg po q hs  - oob  - can take nsaids otc sparingly

## 2018-11-21 NOTE — PROGRESS NOTE ADULT - PROVIDER SPECIALTY LIST ADULT
Cardiology
Critical Care
Internal Medicine
Internal Medicine
Pain Medicine
Surgery
Pulmonology
Surgery
Surgery
Critical Care

## 2018-11-21 NOTE — PROGRESS NOTE ADULT - SUBJECTIVE AND OBJECTIVE BOX
Pt seen and examined at bedside. She is resting comfortably and in no acute distress. She remains afebrile and stable, pain controlled. Pt denies nausea/vomiting and is having BMs.    Vital Signs Last 24 Hrs  T(C): 36.8 (21 Nov 2018 05:25), Max: 36.8 (21 Nov 2018 05:25)  T(F): 98.3 (21 Nov 2018 05:25), Max: 98.3 (21 Nov 2018 05:25)  HR: 59 (21 Nov 2018 05:25) (59 - 82)  BP: 127/76 (21 Nov 2018 05:25) (127/76 - 171/66)  BP(mean): 96 (20 Nov 2018 21:00) (88 - 118)  RR: 16 (21 Nov 2018 05:25) (14 - 25)  SpO2: 99% (21 Nov 2018 05:25) (92% - 99%)    Gen: awake, alert, no distress  Resp: normal inspiratory effort  Cardiac: sinus  Abd: wound c/d/i with staples, no surrounding erythema or purulent discharge, soft, ND, mild appropriate TTP at surgical site, no rebound or guarding    89F POD #5 s/p ex lap, MARIA ISABEL, remains afebrile, and doing well.     - c/w diet  - oobtc, incentive spirometer  - pain control  - f/u PT  - SW/CM   - Dispo planning

## 2018-11-21 NOTE — DISCHARGE NOTE ADULT - CARE PLAN
Principal Discharge DX:	Small bowel obstruction  Goal:	Wound healing  Assessment and plan of treatment:	Please follow up with Dr. Leonard within 1 week   Diet as tolerated   Wound: intermittent packing to be packed daily with half inch packing and covered with dry dressing  Secondary Diagnosis:	Atrial fibrillation  Goal:	Please follow up with Dr. Chambers within 1 week for further management of Afib  Assessment and plan of treatment:	[address] [place] [address] [place] [address]  Tel:   [place] Office : [address] [place] [address]  Tel: [phone]

## 2018-11-22 LAB
CULTURE RESULTS: SIGNIFICANT CHANGE UP
SPECIMEN SOURCE: SIGNIFICANT CHANGE UP

## 2018-11-27 ENCOUNTER — INPATIENT (INPATIENT)
Facility: HOSPITAL | Age: 83
LOS: 3 days | Discharge: HOME | End: 2018-12-01
Attending: INTERNAL MEDICINE | Admitting: INTERNAL MEDICINE
Payer: MEDICARE

## 2018-11-27 VITALS
TEMPERATURE: 98 F | SYSTOLIC BLOOD PRESSURE: 161 MMHG | OXYGEN SATURATION: 94 % | DIASTOLIC BLOOD PRESSURE: 95 MMHG | HEART RATE: 55 BPM | RESPIRATION RATE: 18 BRPM

## 2018-11-27 DIAGNOSIS — Z29.9 ENCOUNTER FOR PROPHYLACTIC MEASURES, UNSPECIFIED: ICD-10-CM

## 2018-11-27 DIAGNOSIS — Z90.49 ACQUIRED ABSENCE OF OTHER SPECIFIED PARTS OF DIGESTIVE TRACT: Chronic | ICD-10-CM

## 2018-11-27 DIAGNOSIS — I10 ESSENTIAL (PRIMARY) HYPERTENSION: ICD-10-CM

## 2018-11-27 DIAGNOSIS — I48.91 UNSPECIFIED ATRIAL FIBRILLATION: ICD-10-CM

## 2018-11-27 DIAGNOSIS — Z90.710 ACQUIRED ABSENCE OF BOTH CERVIX AND UTERUS: Chronic | ICD-10-CM

## 2018-11-27 DIAGNOSIS — K56.609 UNSPECIFIED INTESTINAL OBSTRUCTION, UNSPECIFIED AS TO PARTIAL VERSUS COMPLETE OBSTRUCTION: ICD-10-CM

## 2018-11-27 DIAGNOSIS — I50.9 HEART FAILURE, UNSPECIFIED: ICD-10-CM

## 2018-11-27 DIAGNOSIS — E78.5 HYPERLIPIDEMIA, UNSPECIFIED: ICD-10-CM

## 2018-11-27 PROBLEM — M19.90 UNSPECIFIED OSTEOARTHRITIS, UNSPECIFIED SITE: Chronic | Status: ACTIVE | Noted: 2018-11-16

## 2018-11-27 LAB
ALBUMIN SERPL ELPH-MCNC: 3.1 G/DL — LOW (ref 3.3–5)
ALP SERPL-CCNC: 263 U/L — HIGH (ref 40–120)
ALT FLD-CCNC: 31 U/L — SIGNIFICANT CHANGE UP (ref 4–33)
APTT BLD: 28.4 SEC — SIGNIFICANT CHANGE UP (ref 27.5–36.3)
AST SERPL-CCNC: 62 U/L — HIGH (ref 4–32)
BASOPHILS # BLD AUTO: 0.07 K/UL — SIGNIFICANT CHANGE UP (ref 0–0.2)
BASOPHILS NFR BLD AUTO: 0.7 % — SIGNIFICANT CHANGE UP (ref 0–2)
BILIRUB SERPL-MCNC: 1.1 MG/DL — SIGNIFICANT CHANGE UP (ref 0.2–1.2)
BUN SERPL-MCNC: 13 MG/DL — SIGNIFICANT CHANGE UP (ref 7–23)
CALCIUM SERPL-MCNC: 8.6 MG/DL — SIGNIFICANT CHANGE UP (ref 8.4–10.5)
CHLORIDE SERPL-SCNC: 94 MMOL/L — LOW (ref 98–107)
CO2 SERPL-SCNC: 21 MMOL/L — LOW (ref 22–31)
CREAT SERPL-MCNC: 0.79 MG/DL — SIGNIFICANT CHANGE UP (ref 0.5–1.3)
EOSINOPHIL # BLD AUTO: 0.02 K/UL — SIGNIFICANT CHANGE UP (ref 0–0.5)
EOSINOPHIL NFR BLD AUTO: 0.2 % — SIGNIFICANT CHANGE UP (ref 0–6)
GLUCOSE SERPL-MCNC: 91 MG/DL — SIGNIFICANT CHANGE UP (ref 70–99)
HCT VFR BLD CALC: 38.1 % — SIGNIFICANT CHANGE UP (ref 34.5–45)
HGB BLD-MCNC: 13.3 G/DL — SIGNIFICANT CHANGE UP (ref 11.5–15.5)
IMM GRANULOCYTES # BLD AUTO: 0.07 # — SIGNIFICANT CHANGE UP
IMM GRANULOCYTES NFR BLD AUTO: 0.7 % — SIGNIFICANT CHANGE UP (ref 0–1.5)
INR BLD: 1.1 — SIGNIFICANT CHANGE UP (ref 0.88–1.17)
LYMPHOCYTES # BLD AUTO: 0.95 K/UL — LOW (ref 1–3.3)
LYMPHOCYTES # BLD AUTO: 10.1 % — LOW (ref 13–44)
MCHC RBC-ENTMCNC: 31.2 PG — SIGNIFICANT CHANGE UP (ref 27–34)
MCHC RBC-ENTMCNC: 34.9 % — SIGNIFICANT CHANGE UP (ref 32–36)
MCV RBC AUTO: 89.4 FL — SIGNIFICANT CHANGE UP (ref 80–100)
MONOCYTES # BLD AUTO: 1.09 K/UL — HIGH (ref 0–0.9)
MONOCYTES NFR BLD AUTO: 11.6 % — SIGNIFICANT CHANGE UP (ref 2–14)
NEUTROPHILS # BLD AUTO: 7.21 K/UL — SIGNIFICANT CHANGE UP (ref 1.8–7.4)
NEUTROPHILS NFR BLD AUTO: 76.7 % — SIGNIFICANT CHANGE UP (ref 43–77)
NRBC # FLD: 0 — SIGNIFICANT CHANGE UP
NT-PROBNP SERPL-SCNC: SIGNIFICANT CHANGE UP PG/ML
PLATELET # BLD AUTO: 310 K/UL — SIGNIFICANT CHANGE UP (ref 150–400)
PMV BLD: 10.4 FL — SIGNIFICANT CHANGE UP (ref 7–13)
POTASSIUM SERPL-MCNC: 5.1 MMOL/L — SIGNIFICANT CHANGE UP (ref 3.5–5.3)
POTASSIUM SERPL-SCNC: 5.1 MMOL/L — SIGNIFICANT CHANGE UP (ref 3.5–5.3)
PROT SERPL-MCNC: 6.7 G/DL — SIGNIFICANT CHANGE UP (ref 6–8.3)
PROTHROM AB SERPL-ACNC: 12.3 SEC — SIGNIFICANT CHANGE UP (ref 9.8–13.1)
RBC # BLD: 4.26 M/UL — SIGNIFICANT CHANGE UP (ref 3.8–5.2)
RBC # FLD: 14.9 % — HIGH (ref 10.3–14.5)
SODIUM SERPL-SCNC: 132 MMOL/L — LOW (ref 135–145)
TROPONIN T, HIGH SENSITIVITY: 67 NG/L — CRITICAL HIGH (ref ?–14)
TROPONIN T, HIGH SENSITIVITY: 72 NG/L — CRITICAL HIGH (ref ?–14)
WBC # BLD: 9.41 K/UL — SIGNIFICANT CHANGE UP (ref 3.8–10.5)
WBC # FLD AUTO: 9.41 K/UL — SIGNIFICANT CHANGE UP (ref 3.8–10.5)

## 2018-11-27 PROCEDURE — 71045 X-RAY EXAM CHEST 1 VIEW: CPT | Mod: 26

## 2018-11-27 RX ORDER — LISINOPRIL 2.5 MG/1
1 TABLET ORAL
Qty: 0 | Refills: 0 | COMMUNITY

## 2018-11-27 RX ORDER — ACETAMINOPHEN 500 MG
650 TABLET ORAL EVERY 6 HOURS
Qty: 0 | Refills: 0 | Status: DISCONTINUED | OUTPATIENT
Start: 2018-11-27 | End: 2018-12-01

## 2018-11-27 RX ORDER — FUROSEMIDE 40 MG
20 TABLET ORAL ONCE
Qty: 0 | Refills: 0 | Status: COMPLETED | OUTPATIENT
Start: 2018-11-27 | End: 2018-11-27

## 2018-11-27 RX ORDER — FUROSEMIDE 40 MG
20 TABLET ORAL
Qty: 0 | Refills: 0 | Status: DISCONTINUED | OUTPATIENT
Start: 2018-11-27 | End: 2018-12-01

## 2018-11-27 RX ORDER — METOPROLOL TARTRATE 50 MG
25 TABLET ORAL
Qty: 0 | Refills: 0 | Status: DISCONTINUED | OUTPATIENT
Start: 2018-11-27 | End: 2018-11-30

## 2018-11-27 RX ORDER — FUROSEMIDE 40 MG
1 TABLET ORAL
Qty: 0 | Refills: 0 | COMMUNITY

## 2018-11-27 RX ORDER — APIXABAN 2.5 MG/1
1 TABLET, FILM COATED ORAL
Qty: 0 | Refills: 0 | COMMUNITY

## 2018-11-27 RX ORDER — ATENOLOL 25 MG/1
1 TABLET ORAL
Qty: 0 | Refills: 0 | COMMUNITY

## 2018-11-27 RX ORDER — LISINOPRIL 2.5 MG/1
20 TABLET ORAL DAILY
Qty: 0 | Refills: 0 | Status: DISCONTINUED | OUTPATIENT
Start: 2018-11-27 | End: 2018-11-28

## 2018-11-27 RX ORDER — APIXABAN 2.5 MG/1
2.5 TABLET, FILM COATED ORAL EVERY 12 HOURS
Qty: 0 | Refills: 0 | Status: DISCONTINUED | OUTPATIENT
Start: 2018-11-27 | End: 2018-12-01

## 2018-11-27 RX ORDER — POTASSIUM CHLORIDE 20 MEQ
1 PACKET (EA) ORAL
Qty: 0 | Refills: 0 | COMMUNITY

## 2018-11-27 RX ADMIN — Medication 20 MILLIGRAM(S): at 17:59

## 2018-11-27 NOTE — H&P ADULT - PROBLEM SELECTOR PLAN 3
Risk Factor                                                        Points    [x ] Congestive Heart Failure			1  [x ] Hypertension					1  [ x] Age = 75y					2  [ ] Age 65-74y					1  [ ] Diabetes Mellitus				1  [ ] Stroke/TIA (ie, thromboembolic)		2  [ ] Vascular Disease (MI/PAD/aortic plaque)	1  [x ] Sex Category (ie, female sex)			1    Total Score = 	5    Continue to anticoagulation with. Continue Risk Factor                                                        Points    [x ] Congestive Heart Failure			1  [x ] Hypertension					1  [ x] Age = 75y					2  [ ] Age 65-74y					1  [ ] Diabetes Mellitus				1  [ ] Stroke/TIA (ie, thromboembolic)		2  [ ] Vascular Disease (MI/PAD/aortic plaque)	1  [x ] Sex Category (ie, female sex)			1    Total Score = 	5    Continue to anticoagulation with apixaban. Continue with metoprolol for rate control

## 2018-11-27 NOTE — ED ADULT NURSE NOTE - OBJECTIVE STATEMENT
PT brought into room 13 a&OX4 Mauritian speaking female presents to the ER today from cardiologist office where she presented with SOB and lower leg edema. PT had abdominal surgery 11/16 for SBO. Surgical dressing is clean dry and intact. Upon assessment, patient is breathing non labored in even respirations. Patient has +3 bilateral lower leg edema with pulses present. JVD noted on right carotid artery. PT stopped taking Eliquis for AFIB 2 days ago due to side effects. 20G IV placed in right forearm. Labs sent. Patient placed on cardiac monitor.

## 2018-11-27 NOTE — ED ADULT NURSE NOTE - NSIMPLEMENTINTERV_GEN_ALL_ED
Implemented All Fall with Harm Risk Interventions:  [place] to call system. Call bell, personal items and telephone within reach. Instruct patient to call for assistance. Room bathroom lighting operational. Non-slip footwear when patient is off stretcher. Physically safe environment: no spills, clutter or unnecessary equipment. Stretcher in lowest position, wheels locked, appropriate side rails in place. Provide visual cue, wrist band, yellow gown, etc. Monitor gait and stability. Monitor for mental status changes and reorient to person, place, and time. Review medications for side effects contributing to fall risk. Reinforce activity limits and safety measures with patient and family. Provide visual clues: red socks.

## 2018-11-27 NOTE — H&P ADULT - NSHPPHYSICALEXAM_GEN_ALL_CORE
GENERAL APPEARANCE: Well developed, well nourished, alert and cooperative, and appears to be in no acute distress.  HEAD: normocephalic.  EYES: PERRL, EOMI.   EARS: External auditory canals clear, hearing grossly intact.  NECK: Neck supple, non-tender without lymphadenopathy, masses or thyromegaly.  CARDIAC: Normal S1 and S2. No S3, S4 or murmurs. Rhythm is regular.   LUNGS: + rales bilaterally. Auscultation without, rhonchi, wheezing or diminished breath sounds.  ABDOMEN: Positive bowel sounds. Soft, nondistended, nontender. Dressing clean dry and intact.  No guarding or rebound. No masses.  EXTREMITIES: No significant deformity or joint abnormality. 3 pitting edema up to the thighs bilaterally. Peripheral pulses intact.   SKIN: Skin normal color, texture and turgor with no lesions or eruptions.  PSYCHIATRIC: The mental examination revealed the patient was oriented to person, place, and time. The patient was able to demonstrate good judgement and reason, without hallucinations, abnormal affect or abnormal behaviors during the examination. Patient is not suicidal.

## 2018-11-27 NOTE — H&P ADULT - PROBLEM SELECTOR PLAN 1
Admit to telemetry.   Strict I and O, daily weights. Fluid restriction 1500 ml.   Previous TTE reviewed.  Continue with lasix 20 mg IV BID   check cbc,tsh,lipid, hemoglobin a1c, bmp with mag and phos.  f/U MD note Admit to telemetry.   Strict I and O, daily weights. Fluid restriction 1500 ml.   Previous TTE reviewed.  Continue with lasix 20 mg IV BID   f/u cards consult with Dr. Brown   check cbc,tsh,lipid, hemoglobin a1c, bmp with mag and phos.  f/U MD note

## 2018-11-27 NOTE — ED ADULT NURSE REASSESSMENT NOTE - NS ED NURSE REASSESS COMMENT FT1
pt resting comfortably in bed, family remains at the bedside, repeat troponin drawn and sent.  Pt. awaiting bed assignment.

## 2018-11-27 NOTE — ED PROVIDER NOTE - MUSCULOSKELETAL, MLM
Spine appears normal, range of motion is not limited, no muscle or joint tenderness. bilateral 2+ pitting edema to LE

## 2018-11-27 NOTE — ED ADULT TRIAGE NOTE - CHIEF COMPLAINT QUOTE
pt c/o b/l Lower extremity swelling extending up to thighs and SOB. R sided JVD distention noted to triage.

## 2018-11-27 NOTE — H&P ADULT - PROBLEM SELECTOR PLAN 2
s/p 11/16 exploratory laparotomy w/ MARIA ISABEL   Dressing clean dry and intact  Daily dressing changes.

## 2018-11-27 NOTE — ED PROVIDER NOTE - PROGRESS NOTE DETAILS
LUANN Pabon: spoke with pts cards Dr. Gonzalez ([phone]) who wants pt admitted to Dr. Cristobal who is aware and familiar with patient. LUANN Pabon: CXR with increased markings, lasix given, will admit to Dr. Cristobal LUANN Pabon: spoke with pts cards Dr. Gonzalez ([phone]) who wants pt admitted to Dr. Cristobal who is aware and familiar with patient. Of note pt was started on Xarelto while inpatient however reported GI upset and cards switched to 15mg xarelto LUANN Pabon: CXR with increased markings, lasix given, will admit to Dr. Cristobal.

## 2018-11-27 NOTE — ED PROVIDER NOTE - ATTENDING CONTRIBUTION TO CARE
Jenniferadenike: 89 yo female with a h/o HTN, HLD, afib, CHF, sent in by cardiology DR Gonzalez for admission for CHF exacerbation. Pt is s/p surgical repair of SBO on 11/16 and had a follow up appointment today at which time he sent her to the ED. Pt endorses b/l LE edema and exertional SOB since her surgical admission. + nonproductive cough but no fevers or chills. Exam: GENERAL: well appearing, NAD, HEENT: MMM, PERRLA, CARDIO: +S1/S2, no murmurs, rubs or gallops, LUNGS: diffuse b/l rales, worst at bases, no tachypnea but pt unable to lay flat ABD: soft, nontender, BSx4 quadrants, no guarding or rigidity. EXT: 3+ b/l LE edema, no calf TTP, 2+ distal pulses x 4 extremities. NEURO: AxOx3, SKIN: no rashes or lesions, well perfused A/P- 89 yo female with CHF exacerbation. will obtain cbc, cmp, troponin, CXR, EKG, give IV lasix and reassess.

## 2018-11-27 NOTE — H&P ADULT - NSHPLABSRESULTS_GEN_ALL_CORE
LABS:                        13.3   9.41  )-----------( 310      ( 27 Nov 2018 17:31 )             38.1           PT/INR - ( 27 Nov 2018 16:55 )   PT: 12.3 SEC;   INR: 1.10          PTT - ( 27 Nov 2018 16:55 )  PTT:28.4 SEC    CAPILLARY BLOOD GLUCOSE

## 2018-11-27 NOTE — ED PROVIDER NOTE - MEDICAL DECISION MAKING DETAILS
89 yo F here for CHF exacerbation. Well appearing, VSS. + crackles and rales on exam. + bilateral pitting edema. PLAN: labs, cxr, lasix, reassess

## 2018-11-27 NOTE — ED PROVIDER NOTE - OBJECTIVE STATEMENT
89 yo F hx of HTN HLD afib CHF here for sob and chf exacerbation. pt had recent SBO surgery at [facility] and went to her cardiologist for out patient f/u today and found to have SOB and bilateral LE edema. Cleared from a surgical standpoint however sent to ED for CHF exacerbation and admission. Denies fever chills cp HA weakness abdominal pain cough numbness tingling.

## 2018-11-27 NOTE — H&P ADULT - NSHPREVIEWOFSYSTEMS_GEN_ALL_CORE
Constitutional: No fever, fatigue or weight loss.  Skin: No rash.  Eyes: No recent vision problems or eye pain.  ENT: No congestion, ear pain, or sore throat.  Endocrine: No thyroid problems.  Cardiovascular: No chest pain or palpitation.  Respiratory: + shortness of breath.  No cough, congestion, or wheezing.  Gastrointestinal: No abdominal pain, nausea, vomiting, or diarrhea.  Genitourinary: No dysuria.  Musculoskeletal: No joint swelling. + leg edema   Neurologic: No headache.

## 2018-11-27 NOTE — H&P ADULT - HISTORY OF PRESENT ILLNESS
89 y/o F with h/o HTN, HLD, a. fib on eliquis, CHF (EF 55%), SBO s/p 11/16 exploratory laparotomy w/ MARIA ISABEL presents to the Ed for shortness of breath and leg edema. Pt is primarily Niuean speaking and preferred family to translate at bedside. Pt started having increased leg swelling after her surgery on November 16th. Pt states she has swelling all the way up to her pelvic area. Pt also states she has shortness of breath when she is walking a few steps. Pt also has orthopnea and unable to lay down flat. Pt saw her cardiologist today and was advised to come to the Ed for diuresis. Pt also saw her surgeon today for a check up and was cleared from a surgical standpoint.. Pt is still passing gas and last bowel movement was this morning. Pt denies LOC, syncope,fever, chills, N/V/D/C, chest pain, palpitations, dizziness, numbness, tingling, dysuria, urinary/bowel incontinence or any other complaints at this time.

## 2018-11-27 NOTE — H&P ADULT - PMH
Atrial fibrillation    CHF (congestive heart failure)    Hyperlipidemia    Hypertension    Small bowel obstruction

## 2018-11-27 NOTE — H&P ADULT - ASSESSMENT
89 y/o F with h/o HTN, HLD, a. fib on eliquis, CHF (EF 55%), recent SBO s/p 11/16 exploratory laparotomy w/ MARIA ISABEL presents to the ED for shortness of breath and bilateral lower extremity edema. Admit to telemetry.

## 2018-11-28 LAB
BUN SERPL-MCNC: 11 MG/DL — SIGNIFICANT CHANGE UP (ref 7–23)
CALCIUM SERPL-MCNC: 8.5 MG/DL — SIGNIFICANT CHANGE UP (ref 8.4–10.5)
CHLORIDE SERPL-SCNC: 97 MMOL/L — LOW (ref 98–107)
CHOLEST SERPL-MCNC: 118 MG/DL — LOW (ref 120–199)
CO2 SERPL-SCNC: 22 MMOL/L — SIGNIFICANT CHANGE UP (ref 22–31)
CREAT SERPL-MCNC: 0.75 MG/DL — SIGNIFICANT CHANGE UP (ref 0.5–1.3)
GLUCOSE SERPL-MCNC: 117 MG/DL — HIGH (ref 70–99)
HBA1C BLD-MCNC: 6.1 % — HIGH (ref 4–5.6)
HCT VFR BLD CALC: 34.9 % — SIGNIFICANT CHANGE UP (ref 34.5–45)
HDLC SERPL-MCNC: 47 MG/DL — SIGNIFICANT CHANGE UP (ref 45–65)
HGB BLD-MCNC: 12.1 G/DL — SIGNIFICANT CHANGE UP (ref 11.5–15.5)
LIPID PNL WITH DIRECT LDL SERPL: 60 MG/DL — SIGNIFICANT CHANGE UP
MAGNESIUM SERPL-MCNC: 1.8 MG/DL — SIGNIFICANT CHANGE UP (ref 1.6–2.6)
MCHC RBC-ENTMCNC: 30.9 PG — SIGNIFICANT CHANGE UP (ref 27–34)
MCHC RBC-ENTMCNC: 34.7 % — SIGNIFICANT CHANGE UP (ref 32–36)
MCV RBC AUTO: 89 FL — SIGNIFICANT CHANGE UP (ref 80–100)
NRBC # FLD: 0 — SIGNIFICANT CHANGE UP
PHOSPHATE SERPL-MCNC: 2.8 MG/DL — SIGNIFICANT CHANGE UP (ref 2.5–4.5)
PLATELET # BLD AUTO: 327 K/UL — SIGNIFICANT CHANGE UP (ref 150–400)
PMV BLD: 9.9 FL — SIGNIFICANT CHANGE UP (ref 7–13)
POTASSIUM SERPL-MCNC: 4 MMOL/L — SIGNIFICANT CHANGE UP (ref 3.5–5.3)
POTASSIUM SERPL-SCNC: 4 MMOL/L — SIGNIFICANT CHANGE UP (ref 3.5–5.3)
RBC # BLD: 3.92 M/UL — SIGNIFICANT CHANGE UP (ref 3.8–5.2)
RBC # FLD: 14.9 % — HIGH (ref 10.3–14.5)
SODIUM SERPL-SCNC: 133 MMOL/L — LOW (ref 135–145)
TRIGL SERPL-MCNC: 72 MG/DL — SIGNIFICANT CHANGE UP (ref 10–149)
TSH SERPL-MCNC: 2.52 UIU/ML — SIGNIFICANT CHANGE UP (ref 0.27–4.2)
WBC # BLD: 9.38 K/UL — SIGNIFICANT CHANGE UP (ref 3.8–10.5)
WBC # FLD AUTO: 9.38 K/UL — SIGNIFICANT CHANGE UP (ref 3.8–10.5)

## 2018-11-28 RX ORDER — LISINOPRIL 2.5 MG/1
40 TABLET ORAL DAILY
Qty: 0 | Refills: 0 | Status: DISCONTINUED | OUTPATIENT
Start: 2018-11-29 | End: 2018-12-01

## 2018-11-28 RX ORDER — APIXABAN 2.5 MG/1
2.5 TABLET, FILM COATED ORAL ONCE
Qty: 0 | Refills: 0 | Status: COMPLETED | OUTPATIENT
Start: 2018-11-28 | End: 2018-11-28

## 2018-11-28 RX ORDER — LISINOPRIL 2.5 MG/1
20 TABLET ORAL ONCE
Qty: 0 | Refills: 0 | Status: COMPLETED | OUTPATIENT
Start: 2018-11-28 | End: 2018-11-28

## 2018-11-28 RX ADMIN — LISINOPRIL 20 MILLIGRAM(S): 2.5 TABLET ORAL at 13:46

## 2018-11-28 RX ADMIN — APIXABAN 2.5 MILLIGRAM(S): 2.5 TABLET, FILM COATED ORAL at 22:04

## 2018-11-28 RX ADMIN — APIXABAN 2.5 MILLIGRAM(S): 2.5 TABLET, FILM COATED ORAL at 13:47

## 2018-11-28 RX ADMIN — LISINOPRIL 20 MILLIGRAM(S): 2.5 TABLET ORAL at 05:06

## 2018-11-28 RX ADMIN — Medication 20 MILLIGRAM(S): at 19:22

## 2018-11-28 RX ADMIN — Medication 20 MILLIGRAM(S): at 05:06

## 2018-11-28 RX ADMIN — Medication 25 MILLIGRAM(S): at 05:06

## 2018-11-28 RX ADMIN — Medication 30 MILLILITER(S): at 13:46

## 2018-11-28 RX ADMIN — Medication 25 MILLIGRAM(S): at 19:22

## 2018-11-28 NOTE — CONSULT NOTE ADULT - ASSESSMENT
EKG: Afib 60 RBBB, LAD     Echo: < from: Transthoracic Echocardiogram (11.17.18 @ 10:42) >  1. Mitral annular calcification. Moderate mitral  regurgitation.  2. Normal trileaflet aortic valve.  3. Aortic Root: 3.3 cm.  4. Severe left atrial enlargement.  5. Severe concentric left ventricular hypertrophy.  6. Normal Left Ventricular Systolic Function,  (EF = 55%)  7. Grade III diastolic dysfunction.  8. Normal right atrium.  9. Normal right ventricular size and function.  10. RV systolic pressure is mildly increased at41 mm Hg.  11. There is mild tricuspid regurgitation.  12. There is mild pulmonic regurgitation.  13. Normal pericardium with no pericardial effusion.  14. Left pleural effusion.    < end of copied text >      Assessment  and Plan     1) HFpEF: C/W Lasix 20 IV BID, keep  K>4, mag >2, monitor u/o and renal function     2) HTN continue with metoprolol 25 BID monitor HR, Lisinopril increased to 40mg po daily     3) Afib c/w metoprolol 25 BID, eliquis 2.5 mg po BID    4) DVT PPX : Eliquis

## 2018-11-28 NOTE — CONSULT NOTE ADULT - SUBJECTIVE AND OBJECTIVE BOX
Ladarius Brown MD  Interventional Cardiology   [place] Office : [address] [place] [address] [place] [address]  Tel:   [place] Office : [address] [facility] [place] [address]  Tel: [phone]  Cell : [phone]    HISTORY OF PRESENT ILLNESS:  89 y/o F with h/o HTN, HLD, a. fib on eliquis, CHF (EF 55%), SBO s/p 11/16 exploratory laparotomy w/ MARIA ISABEL presents to the Ed for shortness of breath and leg edema. Pt is primarily Thai speaking and preferred family to translate at bedside. Pt started having increased leg swelling after her surgery on November 16th. Pt states she has swelling all the way up to her pelvic area. Pt also states she has shortness of breath when she is walking a few steps. Pt also has orthopnea and unable to lay down flat. Pt saw her cardiologist today and was advised to come to the Ed for diuresis. Pt also saw her surgeon today for a check up and was cleared from a surgical standpoint.. Pt is still passing gas and last bowel movement was this morning. Pt denies LOC, syncope, fever, chills, N/V/D/C, chest pain, palpitations, dizziness, numbness, tingling, dysuria, urinary/bowel incontinence or any other complaints at this time.      PAST MEDICAL & SURGICAL HISTORY:  Small bowel obstruction  CHF (congestive heart failure)  Atrial fibrillation  Hypertension  Hyperlipidemia  Arthritis  HTN (hypertension)  H/O abdominal hysterectomy  History of cholecystectomy    	    MEDICATIONS:  apixaban 2.5 milliGRAM(s) Oral every 12 hours  furosemide   Injectable 20 milliGRAM(s) IV Push two times a day  lisinopril 40 milliGRAM(s) Oral daily  metoprolol tartrate 25 milliGRAM(s) Oral two times a day        acetaminophen   Tablet .. 650 milliGRAM(s) Oral every 6 hours PRN    aluminum hydroxide/magnesium hydroxide/simethicone Suspension 30 milliLiter(s) Oral every 4 hours PRN          FAMILY HISTORY:  No pertinent family history in first degree relatives        Allergies    No Known Allergies    Intolerances    	      PHYSICAL EXAM:  T(C): 36.5 (11-29-18 @ 17:46), Max: 37.2 (11-28-18 @ 21:20)  HR: 66 (11-29-18 @ 17:46) (56 - 67)  BP: 147/89 (11-29-18 @ 17:46) (130/54 - 159/93)  RR: 18 (11-29-18 @ 17:46) (17 - 18)  SpO2: 98% (11-29-18 @ 17:46) (96% - 98%)  Wt(kg): --  I&O's Summary    28 Nov 2018 07:01  -  29 Nov 2018 07:00  --------------------------------------------------------  IN: 200 mL / OUT: 400 mL / NET: -200 mL    29 Nov 2018 07:01  -  29 Nov 2018 20:11  --------------------------------------------------------  IN: 0 mL / OUT: 850 mL / NET: -850 mL        Appearance: Normal	  HEENT:   Normal oral mucosa, PERRL, EOMI	  Cardiovascular: Normal S1 S2, JVD + , systolic murmur +   Respiratory: B/l Basal creps R>L 	  Gastrointestinal:  Soft, Non-tender, + BS	  Extremities: Normal range of motion, No clubbing, cyanosis, 1+ edema B/L    LABS:	 	    CARDIAC MARKERS:                                  12.5   8.43  )-----------( 315      ( 29 Nov 2018 05:55 )             36.9     11-29    132<L>  |  96<L>  |  11  ----------------------------<  95  4.0   |  20<L>  |  0.79    Ca    8.5      29 Nov 2018 05:55  Phos  2.8     11-28  Mg     1.9     11-29      proBNP:   Lipid Profile:   HgA1c:   TSH:     ASSESSMENT/PLAN:

## 2018-11-28 NOTE — CHART NOTE - NSCHARTNOTEFT_GEN_A_CORE
RN called me b/c pt complained of midsternal chest pain radiating to outer areas of her breasts.  I saw pt laying in bed comfortably with her son at the bedside in the ESSU.  Pt c/o epigastric pain that has subsided.  She denies any chest pain, SOB, palpitations, cough, dizziness, nausea, vomiting.  Pt's son says all she ate today was a cup of coffee.      Vitals: BP= 156/67, HR= 63, RR= 17  GEN: AAO x 3, NAD  HEART: RRR, normal S1 and S2 present  LUNGS: CTA b/l  ABD: Hyperactive BS, soft, mild umbilical tenderness on palpation, non-distended, no palpable masses    A/P: EKG shows Afib at 58bpm with slow ventricular response; unchanged from prior EKG on 11/27/2018.  Symptoms likely due to gas.  Advised pt and her son for her to increase her PO intake.  Will also give Maalox PRN.  Will continue to monitor.

## 2018-11-29 DIAGNOSIS — I50.9 HEART FAILURE, UNSPECIFIED: ICD-10-CM

## 2018-11-29 DIAGNOSIS — I10 ESSENTIAL (PRIMARY) HYPERTENSION: ICD-10-CM

## 2018-11-29 LAB
BUN SERPL-MCNC: 11 MG/DL — SIGNIFICANT CHANGE UP (ref 7–23)
CALCIUM SERPL-MCNC: 8.5 MG/DL — SIGNIFICANT CHANGE UP (ref 8.4–10.5)
CHLORIDE SERPL-SCNC: 96 MMOL/L — LOW (ref 98–107)
CO2 SERPL-SCNC: 20 MMOL/L — LOW (ref 22–31)
CREAT SERPL-MCNC: 0.79 MG/DL — SIGNIFICANT CHANGE UP (ref 0.5–1.3)
GLUCOSE SERPL-MCNC: 95 MG/DL — SIGNIFICANT CHANGE UP (ref 70–99)
HCT VFR BLD CALC: 36.9 % — SIGNIFICANT CHANGE UP (ref 34.5–45)
HGB BLD-MCNC: 12.5 G/DL — SIGNIFICANT CHANGE UP (ref 11.5–15.5)
MAGNESIUM SERPL-MCNC: 1.9 MG/DL — SIGNIFICANT CHANGE UP (ref 1.6–2.6)
MCHC RBC-ENTMCNC: 31.4 PG — SIGNIFICANT CHANGE UP (ref 27–34)
MCHC RBC-ENTMCNC: 33.9 % — SIGNIFICANT CHANGE UP (ref 32–36)
MCV RBC AUTO: 92.7 FL — SIGNIFICANT CHANGE UP (ref 80–100)
NRBC # FLD: 0 — SIGNIFICANT CHANGE UP
PLATELET # BLD AUTO: 315 K/UL — SIGNIFICANT CHANGE UP (ref 150–400)
PMV BLD: 9.7 FL — SIGNIFICANT CHANGE UP (ref 7–13)
POTASSIUM SERPL-MCNC: 4 MMOL/L — SIGNIFICANT CHANGE UP (ref 3.5–5.3)
POTASSIUM SERPL-SCNC: 4 MMOL/L — SIGNIFICANT CHANGE UP (ref 3.5–5.3)
RBC # BLD: 3.98 M/UL — SIGNIFICANT CHANGE UP (ref 3.8–5.2)
RBC # FLD: 15.1 % — HIGH (ref 10.3–14.5)
SODIUM SERPL-SCNC: 132 MMOL/L — LOW (ref 135–145)
WBC # BLD: 8.43 K/UL — SIGNIFICANT CHANGE UP (ref 3.8–10.5)
WBC # FLD AUTO: 8.43 K/UL — SIGNIFICANT CHANGE UP (ref 3.8–10.5)

## 2018-11-29 RX ORDER — SENNA PLUS 8.6 MG/1
2 TABLET ORAL AT BEDTIME
Qty: 0 | Refills: 0 | Status: DISCONTINUED | OUTPATIENT
Start: 2018-11-29 | End: 2018-12-01

## 2018-11-29 RX ORDER — DOCUSATE SODIUM 100 MG
100 CAPSULE ORAL DAILY
Qty: 0 | Refills: 0 | Status: DISCONTINUED | OUTPATIENT
Start: 2018-11-29 | End: 2018-12-01

## 2018-11-29 RX ADMIN — Medication 25 MILLIGRAM(S): at 06:41

## 2018-11-29 RX ADMIN — Medication 100 MILLIGRAM(S): at 23:29

## 2018-11-29 RX ADMIN — Medication 20 MILLIGRAM(S): at 06:41

## 2018-11-29 RX ADMIN — LISINOPRIL 40 MILLIGRAM(S): 2.5 TABLET ORAL at 06:41

## 2018-11-29 RX ADMIN — APIXABAN 2.5 MILLIGRAM(S): 2.5 TABLET, FILM COATED ORAL at 22:12

## 2018-11-29 RX ADMIN — Medication 20 MILLIGRAM(S): at 18:39

## 2018-11-29 RX ADMIN — APIXABAN 2.5 MILLIGRAM(S): 2.5 TABLET, FILM COATED ORAL at 10:43

## 2018-11-29 RX ADMIN — SENNA PLUS 2 TABLET(S): 8.6 TABLET ORAL at 23:29

## 2018-11-29 RX ADMIN — Medication 25 MILLIGRAM(S): at 18:39

## 2018-11-29 NOTE — DIETITIAN INITIAL EVALUATION ADULT. - PROBLEM SELECTOR PLAN 3
Risk Factor                                                        Points    [x ] Congestive Heart Failure			1  [x ] Hypertension					1  [ x] Age = 75y					2  [ ] Age 65-74y					1  [ ] Diabetes Mellitus				1  [ ] Stroke/TIA (ie, thromboembolic)		2  [ ] Vascular Disease (MI/PAD/aortic plaque)	1  [x ] Sex Category (ie, female sex)			1    Total Score = 	5    Continue to anticoagulation with apixaban. Continue with metoprolol for rate control

## 2018-11-29 NOTE — DIETITIAN INITIAL EVALUATION ADULT. - OTHER INFO
Patient seen for nutrition consult for Registered dietitian. Per chart: patient is a 89 year old female with history of HTN, HLD, a fib, recent SBO s/p 11/16 exploratory laparotomy- presented with bi-lateral lower extremity edema.   Son reported patient ate about 50% of breakfast- which is about normal for her. Patient denies any nausea/vomiting/diarrhea/constipation or difficulty chewing (patient wears denture) and swallowing. Patient reports no food allergies or intolerances. Reported Usual weight 126 pounds. Stated in October patient weighed about 122 to 123 pounds. Current weight: 125.8 pounds. Son reported patient doesn't appear to have lost weight, however is retaining fluid in her legs- patient noted with +1 edema.

## 2018-11-29 NOTE — DIETITIAN INITIAL EVALUATION ADULT. - PERTINENT MEDS FT
MEDICATIONS  (STANDING):  apixaban 2.5 milliGRAM(s) Oral every 12 hours  furosemide   Injectable 20 milliGRAM(s) IV Push two times a day  lisinopril 40 milliGRAM(s) Oral daily  metoprolol tartrate 25 milliGRAM(s) Oral two times a day    MEDICATIONS  (PRN):  acetaminophen   Tablet .. 650 milliGRAM(s) Oral every 6 hours PRN Mild Pain (1 - 3), Moderate Pain (4 - 6), Severe Pain (7 - 10)  aluminum hydroxide/magnesium hydroxide/simethicone Suspension 30 milliLiter(s) Oral every 4 hours PRN Dyspepsia

## 2018-11-29 NOTE — DIETITIAN INITIAL EVALUATION ADULT. - ORAL INTAKE PTA
Son reported patient consumes small meals throughout the days- reports patient never eats really big deals. Will snack throughout the day./good

## 2018-11-29 NOTE — DIETITIAN INITIAL EVALUATION ADULT. - ENERGY NEEDS
Ht: no height in chart- son reported 60 inches  Wt: 125.8 pounds BMI: 24.5 kg/m2 IBW: 100 (+/-10%) %IBW: 125%    Edema: + 1 edema right/left foot, left/right ankle.   Skin: intact, no pressure injuries noted

## 2018-11-29 NOTE — DIETITIAN INITIAL EVALUATION ADULT. - NS AS NUTRI INTERV ED CONTENT
Reviewed Heart Healthy diet education (low sodium, low cholesterol, "good fats" vs "bad fats," fiber, label reading, meal planning, and daily weight monitoring).

## 2018-11-29 NOTE — PROGRESS NOTE ADULT - ASSESSMENT
EKG: Afib 60 RBBB, LAD     Echo: < from: Transthoracic Echocardiogram (11.17.18 @ 10:42) >  1. Mitral annular calcification. Moderate mitral  regurgitation.  2. Normal trileaflet aortic valve.  3. Aortic Root: 3.3 cm.  4. Severe left atrial enlargement.  5. Severe concentric left ventricular hypertrophy.  6. Normal Left Ventricular Systolic Function,  (EF = 55%)  7. Grade III diastolic dysfunction.  8. Normal right atrium.  9. Normal right ventricular size and function.  10. RV systolic pressure is mildly increased at41 mm Hg.  11. There is mild tricuspid regurgitation.  12. There is mild pulmonic regurgitation.  13. Normal pericardium with no pericardial effusion.  14. Left pleural effusion.    < end of copied text >      Assessment  and Plan     1) HFpEF: C/W Lasix 20 IV BID, keep  K>4, mag >2, monitor u/o and renal function     2) HTN continue with metoprolol 25 BID monitor HR, Lisinopril increased to 40mg po daily     3) Afib c/w metoprolol 25 BID, eliquis 5 mg po BID    4) DVT PPX : Eliquis

## 2018-11-29 NOTE — DIETITIAN INITIAL EVALUATION ADULT. - PROBLEM SELECTOR PLAN 1
Admit to telemetry.   Strict I and O, daily weights. Fluid restriction 1500 ml.   Previous TTE reviewed.  Continue with lasix 20 mg IV BID   f/u cards consult with Dr. Brown   check cbc,tsh,lipid, hemoglobin a1c, bmp with mag and phos.  f/U MD note

## 2018-11-29 NOTE — DIETITIAN INITIAL EVALUATION ADULT. - PERTINENT LABORATORY DATA
11-29 Na132 mmol/L<L> Glu 95 mg/dL K+ 4.0 mmol/L Cr  0.79 mg/dL BUN 11 mg/dL 11-28 Phos 2.8 mg/dL 11-27 Alb 3.1 g/dL<L> 11-28 [id number] 6.1 %<H> 11-28 Chol 118 mg/dL<L> LDL 60 mg/dL HDL 47 mg/dL Trig 72 mg/dL

## 2018-11-30 LAB
BUN SERPL-MCNC: 11 MG/DL — SIGNIFICANT CHANGE UP (ref 7–23)
CALCIUM SERPL-MCNC: 8.7 MG/DL — SIGNIFICANT CHANGE UP (ref 8.4–10.5)
CHLORIDE SERPL-SCNC: 96 MMOL/L — LOW (ref 98–107)
CO2 SERPL-SCNC: 24 MMOL/L — SIGNIFICANT CHANGE UP (ref 22–31)
CREAT SERPL-MCNC: 0.82 MG/DL — SIGNIFICANT CHANGE UP (ref 0.5–1.3)
GLUCOSE SERPL-MCNC: 113 MG/DL — HIGH (ref 70–99)
HCT VFR BLD CALC: 35 % — SIGNIFICANT CHANGE UP (ref 34.5–45)
HGB BLD-MCNC: 12 G/DL — SIGNIFICANT CHANGE UP (ref 11.5–15.5)
MAGNESIUM SERPL-MCNC: 1.8 MG/DL — SIGNIFICANT CHANGE UP (ref 1.6–2.6)
MCHC RBC-ENTMCNC: 31.2 PG — SIGNIFICANT CHANGE UP (ref 27–34)
MCHC RBC-ENTMCNC: 34.3 % — SIGNIFICANT CHANGE UP (ref 32–36)
MCV RBC AUTO: 90.9 FL — SIGNIFICANT CHANGE UP (ref 80–100)
NRBC # FLD: 0 — SIGNIFICANT CHANGE UP
PLATELET # BLD AUTO: 379 K/UL — SIGNIFICANT CHANGE UP (ref 150–400)
PMV BLD: 9.7 FL — SIGNIFICANT CHANGE UP (ref 7–13)
POTASSIUM SERPL-MCNC: 3.7 MMOL/L — SIGNIFICANT CHANGE UP (ref 3.5–5.3)
POTASSIUM SERPL-SCNC: 3.7 MMOL/L — SIGNIFICANT CHANGE UP (ref 3.5–5.3)
RBC # BLD: 3.85 M/UL — SIGNIFICANT CHANGE UP (ref 3.8–5.2)
RBC # FLD: 14.9 % — HIGH (ref 10.3–14.5)
SODIUM SERPL-SCNC: 134 MMOL/L — LOW (ref 135–145)
WBC # BLD: 9.63 K/UL — SIGNIFICANT CHANGE UP (ref 3.8–10.5)
WBC # FLD AUTO: 9.63 K/UL — SIGNIFICANT CHANGE UP (ref 3.8–10.5)

## 2018-11-30 PROCEDURE — 93970 EXTREMITY STUDY: CPT | Mod: 26

## 2018-11-30 RX ORDER — METOPROLOL TARTRATE 50 MG
12.5 TABLET ORAL
Qty: 0 | Refills: 0 | Status: DISCONTINUED | OUTPATIENT
Start: 2018-11-30 | End: 2018-12-01

## 2018-11-30 RX ADMIN — Medication 25 MILLIGRAM(S): at 06:25

## 2018-11-30 RX ADMIN — APIXABAN 2.5 MILLIGRAM(S): 2.5 TABLET, FILM COATED ORAL at 21:57

## 2018-11-30 RX ADMIN — APIXABAN 2.5 MILLIGRAM(S): 2.5 TABLET, FILM COATED ORAL at 09:55

## 2018-11-30 RX ADMIN — Medication 20 MILLIGRAM(S): at 06:27

## 2018-11-30 RX ADMIN — Medication 20 MILLIGRAM(S): at 17:46

## 2018-11-30 RX ADMIN — LISINOPRIL 40 MILLIGRAM(S): 2.5 TABLET ORAL at 06:25

## 2018-11-30 RX ADMIN — Medication 12.5 MILLIGRAM(S): at 21:57

## 2018-11-30 NOTE — CHART NOTE - NSCHARTNOTEFT_GEN_A_CORE
heart rate down to 37 early this morning.  will change to metoprolol 12.5mg BId and continue to monitor.  Natalie KONG

## 2018-11-30 NOTE — PROGRESS NOTE ADULT - PROBLEM SELECTOR PLAN 1
C/W Lasix 20 IV BID, keep  K>4, mag >2, monitor u/o and renal function
C/W Lasix 20 IV BID, keep  K>4, mag >2, monitor u/o and renal function

## 2018-11-30 NOTE — PROGRESS NOTE ADULT - ASSESSMENT
EKG: Afib 60 RBBB, LAD     Echo: < from: Transthoracic Echocardiogram (11.17.18 @ 10:42) >  1. Mitral annular calcification. Moderate mitral  regurgitation.  2. Normal trileaflet aortic valve.  3. Aortic Root: 3.3 cm.  4. Severe left atrial enlargement.  5. Severe concentric left ventricular hypertrophy.  6. Normal Left Ventricular Systolic Function,  (EF = 55%)  7. Grade III diastolic dysfunction.  8. Normal right atrium.  9. Normal right ventricular size and function.  10. RV systolic pressure is mildly increased at41 mm Hg.  11. There is mild tricuspid regurgitation.  12. There is mild pulmonic regurgitation.  13. Normal pericardium with no pericardial effusion.  14. Left pleural effusion.    < end of copied text >      Assessment  and Plan     1) HFpEF: C/W Lasix 20 IV BID, keep  K>4, mag >2, monitor u/o and renal function, pt want to go home today, can switch to 20 po bid.       2) HTN: decrease metoprolol to 12.5 BID Lisinopril increased to 40mg po daily     3) Afib decrease metoprolol to 12.5 BID, eliquis 2.5 mg po BID    4) DVT PPX : Eliquis , LE dopplers

## 2018-12-01 ENCOUNTER — TRANSCRIPTION ENCOUNTER (OUTPATIENT)
Age: 83
End: 2018-12-01

## 2018-12-01 VITALS — WEIGHT: 124.12 LBS

## 2018-12-01 RX ORDER — FUROSEMIDE 40 MG
3 TABLET ORAL
Qty: 90 | Refills: 0 | OUTPATIENT
Start: 2018-12-01

## 2018-12-01 RX ORDER — LISINOPRIL 2.5 MG/1
1 TABLET ORAL
Qty: 0 | Refills: 0 | COMMUNITY

## 2018-12-01 RX ORDER — FUROSEMIDE 40 MG
1 TABLET ORAL
Qty: 0 | Refills: 0 | COMMUNITY

## 2018-12-01 RX ORDER — ACETAMINOPHEN 500 MG
1 TABLET ORAL
Qty: 0 | Refills: 0 | COMMUNITY

## 2018-12-01 RX ORDER — AMLODIPINE BESYLATE 2.5 MG/1
2.5 TABLET ORAL DAILY
Qty: 0 | Refills: 0 | Status: DISCONTINUED | OUTPATIENT
Start: 2018-12-01 | End: 2018-12-01

## 2018-12-01 RX ORDER — AMLODIPINE BESYLATE 2.5 MG/1
1 TABLET ORAL
Qty: 30 | Refills: 0 | OUTPATIENT
Start: 2018-12-01

## 2018-12-01 RX ORDER — ATENOLOL 25 MG/1
1 TABLET ORAL
Qty: 0 | Refills: 0 | COMMUNITY

## 2018-12-01 RX ORDER — LISINOPRIL 2.5 MG/1
1 TABLET ORAL
Qty: 30 | Refills: 0 | OUTPATIENT
Start: 2018-12-01

## 2018-12-01 RX ADMIN — LISINOPRIL 40 MILLIGRAM(S): 2.5 TABLET ORAL at 06:12

## 2018-12-01 RX ADMIN — APIXABAN 2.5 MILLIGRAM(S): 2.5 TABLET, FILM COATED ORAL at 09:54

## 2018-12-01 RX ADMIN — Medication 20 MILLIGRAM(S): at 06:12

## 2018-12-01 RX ADMIN — Medication 650 MILLIGRAM(S): at 07:42

## 2018-12-01 RX ADMIN — Medication 12.5 MILLIGRAM(S): at 09:54

## 2018-12-01 RX ADMIN — AMLODIPINE BESYLATE 2.5 MILLIGRAM(S): 2.5 TABLET ORAL at 09:56

## 2018-12-01 RX ADMIN — Medication 650 MILLIGRAM(S): at 08:14

## 2018-12-01 NOTE — PROGRESS NOTE ADULT - REASON FOR ADMISSION
shortness of breath and leg edema

## 2018-12-01 NOTE — DISCHARGE NOTE ADULT - PHYSICIAN SECTION COMPLETE
Patient placed on O2 @ 2L/min via NC per Dr. Argueta's orders. Will continue to monitor.      Laxmi Abbott RN  08/17/17 0823     Yes

## 2018-12-01 NOTE — DISCHARGE NOTE ADULT - MEDICATION SUMMARY - MEDICATIONS TO CHANGE
I will SWITCH the dose or number of times a day I take the medications listed below when I get home from the hospital:    lisinopril 20 mg oral tablet  -- 1 tab(s) by mouth once a day    furosemide 40 mg oral tablet  -- 1 tab(s) by mouth once a day    lisinopril 20 mg oral tablet  -- 1 tab(s) by mouth once a day    Lasix 40 mg oral tablet  -- 1 tab(s) by mouth once a day

## 2018-12-01 NOTE — DISCHARGE NOTE ADULT - CARE PROVIDER_API CALL
Ladarius Brown), Cardiovascular Disease; Internal Medicine  [address] [place] [address]  Phone: [phone]  Fax: [phone]

## 2018-12-01 NOTE — DISCHARGE NOTE ADULT - PATIENT PORTAL LINK FT
You can access the [email or web address] Patient Portal, offered by [facility], by registering with the following website: [email or web address]

## 2018-12-01 NOTE — DISCHARGE NOTE ADULT - MEDICATION SUMMARY - MEDICATIONS TO STOP TAKING
I will STOP taking the medications listed below when I get home from the hospital:    atenolol 25 mg oral tablet  -- 1 tab(s) by mouth once a day    atenolol 25 mg oral tablet  -- 1 tab(s) by mouth once a day    acetaminophen 650 mg oral tablet  -- 1 tab(s) by mouth 4 times a day, As Needed

## 2018-12-01 NOTE — PROGRESS NOTE ADULT - SUBJECTIVE AND OBJECTIVE BOX
ADDENDUM:    As per medicine, patient  had developed a new onset A-fib adn CHF.  Troponins were slightly elevated.  However, there was no mention as per medicine that this was related to an NSTEMI    Therefore, as per medicine, it was NOT felt to be due to an NSTMI.
Ladarius Brown MD  Interventional Cardiology  [place] Office : [address] [place] [address] [address]. [address]  Tel:   [place] Office : [address] [facility] [place] [address]  Tel: [phone]  Cell : [phone]    Subjective : Pt lying in bed comfortable, not in distress, denies any chest pain or SOB  	  MEDICATIONS:  apixaban 2.5 milliGRAM(s) Oral every 12 hours  furosemide   Injectable 20 milliGRAM(s) IV Push two times a day  lisinopril 40 milliGRAM(s) Oral daily  metoprolol tartrate 25 milliGRAM(s) Oral two times a day        acetaminophen   Tablet .. 650 milliGRAM(s) Oral every 6 hours PRN    aluminum hydroxide/magnesium hydroxide/simethicone Suspension 30 milliLiter(s) Oral every 4 hours PRN          PHYSICAL EXAM:  T(C): 36.5 (11-29-18 @ 17:46), Max: 37.2 (11-28-18 @ 21:20)  HR: 66 (11-29-18 @ 17:46) (56 - 67)  BP: 147/89 (11-29-18 @ 17:46) (130/54 - 159/93)  RR: 18 (11-29-18 @ 17:46) (17 - 18)  SpO2: 98% (11-29-18 @ 17:46) (96% - 98%)  Wt(kg): --  I&O's Summary    28 Nov 2018 07:01 - 29 Nov 2018 07:00  --------------------------------------------------------  IN: 200 mL / OUT: 400 mL / NET: -200 mL    29 Nov 2018 07:01 - 29 Nov 2018 20:02  --------------------------------------------------------  IN: 0 mL / OUT: 850 mL / NET: -850 mL        Weight (kg): 57.1 (11-29 @ 06:57)    Appearance: Normal	  HEENT:   Normal oral mucosa, PERRL, EOMI	  Cardiovascular: Normal S1 S2, JVD,systolic murmur  present  No edema  Respiratory: Lungs clear to auscultation	  Gastrointestinal:  Soft, Non-tender, + BS	  Extremities: No clubbing, cyanosis, 1 + edema b/l le                                     12.5   8.43  )-----------( 315      ( 29 Nov 2018 05:55 )             36.9     11-29    132<L>  |  96<L>  |  11  ----------------------------<  95  4.0   |  20<L>  |  0.79    Ca    8.5      29 Nov 2018 05:55  Phos  2.8     11-28  Mg     1.9     11-29      proBNP:   Lipid Profile:   HgA1c:   TSH:
Ladarius Brown MD  Interventional Cardiology  [place] Office : [address] [place] [address] [address] [address]  Tel:   [place] Office : [address] [facility] [place] [address]  Tel: [phone]  Cell : [phone]    Subjective : Pt lying in bed comfortable, not in distress, denies any chest pain or SOB  	  MEDICATIONS:  apixaban 2.5 milliGRAM(s) Oral every 12 hours  furosemide   Injectable 20 milliGRAM(s) IV Push two times a day  lisinopril 40 milliGRAM(s) Oral daily        acetaminophen   Tablet .. 650 milliGRAM(s) Oral every 6 hours PRN    aluminum hydroxide/magnesium hydroxide/simethicone Suspension 30 milliLiter(s) Oral every 4 hours PRN  docusate sodium 100 milliGRAM(s) Oral daily  senna 2 Tablet(s) Oral at bedtime          PHYSICAL EXAM:  T(C): 36.4 (11-30-18 @ 06:08), Max: 36.7 (11-29-18 @ 22:04)  HR: 66 (11-30-18 @ 06:08) (56 - 66)  BP: 137/66 (11-30-18 @ 06:08) (133/74 - 147/89)  RR: 17 (11-30-18 @ 06:08) (17 - 18)  SpO2: 96% (11-30-18 @ 06:08) (96% - 100%)  Wt(kg): --  I&O's Summary    29 Nov 2018 07:01  -  30 Nov 2018 07:00  --------------------------------------------------------  IN: 250 mL / OUT: 1470 mL / NET: -1220 mL    30 Nov 2018 07:01  -  30 Nov 2018 13:11  --------------------------------------------------------  IN: 0 mL / OUT: 250 mL / NET: -250 mL        Appearance: Normal	  HEENT:   Normal oral mucosa, PERRL, EOMI	  Cardiovascular: Normal S1 S2, JVD,systolic murmur  present  No edema  Respiratory: Lungs clear to auscultation	  Gastrointestinal:  Soft, Non-tender, + BS	  Extremities: No clubbing, cyanosis, 1 + edema b/l le                                     12.0   9.63  )-----------( 379      ( 30 Nov 2018 07:50 )             35.0     11-30    134<L>  |  96<L>  |  11  ----------------------------<  113<H>  3.7   |  24  |  0.82    Ca    8.7      30 Nov 2018 07:50  Mg     1.8     11-30      proBNP:   Lipid Profile:   HgA1c:   TSH:
Ladarius Brown MD  Interventional Cardiology / Advance Heart Failure and Cardiac Transplant Specialist  [place] Office : [address] [place] [address]  Tel:   [place] Office : [address] [facility] [address] [address]  Tel: [phone]  Cell : [phone]    Subjective : Pt lying in bed comfortable, not in distress, denies any chest pain or SOB  	  MEDICATIONS:  amLODIPine   Tablet 2.5 milliGRAM(s) Oral daily  apixaban 2.5 milliGRAM(s) Oral every 12 hours  furosemide   Injectable 20 milliGRAM(s) IV Push two times a day  lisinopril 40 milliGRAM(s) Oral daily  metoprolol tartrate 12.5 milliGRAM(s) Oral two times a day        acetaminophen   Tablet .. 650 milliGRAM(s) Oral every 6 hours PRN    aluminum hydroxide/magnesium hydroxide/simethicone Suspension 30 milliLiter(s) Oral every 4 hours PRN  docusate sodium 100 milliGRAM(s) Oral daily  senna 2 Tablet(s) Oral at bedtime          PHYSICAL EXAM:  T(C): 36.4 (12-01-18 @ 05:05), Max: 36.5 (11-30-18 @ 21:38)  HR: 70 (12-01-18 @ 09:54) (64 - 74)  BP: 153/76 (12-01-18 @ 09:54) (124/56 - 155/84)  RR: 18 (12-01-18 @ 09:54) (17 - 18)  SpO2: 99% (12-01-18 @ 09:54) (95% - 99%)  Wt(kg): --  I&O's Summary    30 Nov 2018 07:01  -  01 Dec 2018 07:00  --------------------------------------------------------  IN: 630 mL / OUT: 1200 mL / NET: -570 mL          Appearance: Normal	  HEENT:   Normal oral mucosa, PERRL, EOMI	  Cardiovascular: Normal S1 S2, No JVD, No murmurs, No edema  Respiratory: Lungs clear to auscultation	  Gastrointestinal:  Soft, Non-tender, + BS	  Extremities: Normal range of motion, No clubbing, cyanosis or edema                                    12.0   9.63  )-----------( 379      ( 30 Nov 2018 07:50 )             35.0     11-30    134<L>  |  96<L>  |  11  ----------------------------<  113<H>  3.7   |  24  |  0.82    Ca    8.7      30 Nov 2018 07:50  Mg     1.8     11-30      proBNP:   Lipid Profile:   HgA1c:   TSH:
Subjective : Pt lying in bed comfortable, not in distress, denies any chest pain or SOB  	  MEDICATIONS  (STANDING):  apixaban 2.5 milliGRAM(s) Oral every 12 hours  furosemide   Injectable 20 milliGRAM(s) IV Push two times a day  lisinopril 40 milliGRAM(s) Oral daily  metoprolol tartrate 25 milliGRAM(s) Oral two times a day    MEDICATIONS  (PRN):  acetaminophen   Tablet .. 650 milliGRAM(s) Oral every 6 hours PRN Mild Pain (1 - 3), Moderate Pain (4 - 6), Severe Pain (7 - 10)  aluminum hydroxide/magnesium hydroxide/simethicone Suspension 30 milliLiter(s) Oral every 4 hours PRN Dyspepsia    Vital Signs Last 24 Hrs  T(C): 36.7 (29 Nov 2018 22:04), Max: 36.7 (29 Nov 2018 22:04)  T(F): 98 (29 Nov 2018 22:04), Max: 98 (29 Nov 2018 22:04)  HR: 65 (29 Nov 2018 22:04) (56 - 67)  BP: 133/74 (29 Nov 2018 22:04) (133/74 - 159/93)  BP(mean): --  RR: 18 (29 Nov 2018 22:04) (18 - 18)  SpO2: 100% (29 Nov 2018 22:04) (97% - 100%)    Appearance: Normal	  HEENT:   Normal oral mucosa, PERRL, EOMI	  Cardiovascular: Normal S1 S2, JVD,systolic murmur  present  No edema  Respiratory: Lungs clear to auscultation	  Gastrointestinal:  Soft, Non-tender, + BS	  Extremities: No clubbing, cyanosis, 1 + edema b/l le     LABS:  11-29    132<L>  |  96<L>  |  11  ----------------------------<  95  4.0   |  20<L>  |  0.79    Ca    8.5      29 Nov 2018 05:55  Phos  2.8     11-28  Mg     1.9     11-29      Creatinine Trend: 0.79 <--, 0.75 <--, 0.79 <--                        12.5   8.43  )-----------( 315      ( 29 Nov 2018 05:55 )             36.9     Urine Studies:
Subjective : Pt lying in bed comfortable, not in distress, denies any chest pain or SOB  	  MEDICATIONS  (STANDING):  apixaban 2.5 milliGRAM(s) Oral every 12 hours  docusate sodium 100 milliGRAM(s) Oral daily  furosemide   Injectable 20 milliGRAM(s) IV Push two times a day  lisinopril 40 milliGRAM(s) Oral daily  metoprolol tartrate 12.5 milliGRAM(s) Oral two times a day  senna 2 Tablet(s) Oral at bedtime    MEDICATIONS  (PRN):  acetaminophen   Tablet .. 650 milliGRAM(s) Oral every 6 hours PRN Mild Pain (1 - 3), Moderate Pain (4 - 6), Severe Pain (7 - 10)  aluminum hydroxide/magnesium hydroxide/simethicone Suspension 30 milliLiter(s) Oral every 4 hours PRN Dyspepsia    Vital Signs Last 24 Hrs  T(C): 36.3 (30 Nov 2018 13:24), Max: 36.7 (29 Nov 2018 22:04)  T(F): 97.3 (30 Nov 2018 13:24), Max: 98 (29 Nov 2018 22:04)  HR: 64 (30 Nov 2018 17:16) (64 - 71)  BP: 124/56 (30 Nov 2018 17:16) (121/62 - 137/66)  BP(mean): --  RR: 18 (30 Nov 2018 17:16) (17 - 18)  SpO2: 99% (30 Nov 2018 17:16) (96% - 100%)    Appearance: Normal	  HEENT:   Normal oral mucosa, PERRL, EOMI	  Cardiovascular: Normal S1 S2, JVD,systolic murmur  present  No edema  Respiratory: Lungs clear to auscultation	  Gastrointestinal:  Soft, Non-tender, + BS	  Extremities: No clubbing, cyanosis, 1 + edema b/l le     LABS:  11-30    134<L>  |  96<L>  |  11  ----------------------------<  113<H>  3.7   |  24  |  0.82    Ca    8.7      30 Nov 2018 07:50  Mg     1.8     11-30      Creatinine Trend: 0.82 <--, 0.79 <--, 0.75 <--, 0.79 <--                        12.0   9.63  )-----------( 379      ( 30 Nov 2018 07:50 )             35.0     Urine Studies:

## 2018-12-01 NOTE — DISCHARGE NOTE ADULT - PLAN OF CARE
-low salt diet   -monitor for signs of weight gain, shortness of breath and leg/foot swelling rate control and prevention of embolic episodes -continue anticoagulation with Eliquis normotension BP <150/80 -continue with lisinopril, lasix, amlodipine as prescribed   -your AMLODIPINE was increased from 2.5mg to 5mg daily  -your LISINOPRIL was increased from 20mg to 40mg daily pain free/SBO free -s/p Prevent fluid overload. Maintain euvolemia. Ensure compliance with medications. -LASIX increased from 40mg DAILY to 60mg DAILY   -LISINOPRIL increased from 20mg DAILY to 40mg DAILY   -low salt diet with fluid restriction   -monitor for signs of weight gain, shortness of breath and leg/foot swelling  -follow up with cardiologist within 1 WEEK of discharge -continue with lisinopril, lasix, amlodipine as prescribed   -your AMLODIPINE was increased from 2.5mg to 5mg daily  -your LISINOPRIL was increased from 20mg to 40mg daily  -your LASIX was increased from 40mg to 60mg daily -s/p ex lap with MARIA ISABEL on 11/16   -outpatient follow up with PMD -continue anticoagulation with Eliquis/Apixaban

## 2018-12-01 NOTE — DISCHARGE NOTE ADULT - MEDICATION SUMMARY - MEDICATIONS TO TAKE
I will START or STAY ON the medications listed below when I get home from the hospital:    rolling walker   -- Indication: For pt    lisinopril 40 mg oral tablet  -- 1 tab(s) by mouth once a day  -- Indication: For HTN (hypertension)    Eliquis 2.5 mg oral tablet  -- 1 tab(s) by mouth 2 times a day   -- Check with your doctor before becoming pregnant.  It is very important that you take or use this exactly as directed.  Do not skip doses or discontinue unless directed by your doctor.  Obtain medical advice before taking any non-prescription drugs as some may affect the action of this medication.    -- Indication: For Afib    Norvasc 5 mg oral tablet  -- 1 tab(s) by mouth once a day  -- Indication: For HTN (hypertension)    furosemide 20 mg oral tablet  -- 3 tab(s) by mouth once a day   -- Avoid prolonged or excessive exposure to direct and/or artificial sunlight while taking this medication.  It is very important that you take or use this exactly as directed.  Do not skip doses or discontinue unless directed by your doctor.  It may be advisable to drink a full glass orange juice or eat a banana daily while taking this medication.    -- Indication: For Hf    potassium chloride 10 mEq oral capsule, extended release  -- 1 tab(s) by mouth once a day  -- Indication: For Hf

## 2018-12-01 NOTE — DISCHARGE NOTE ADULT - CARE PLAN
Principal Discharge DX:	CHF exacerbation  Assessment and plan of treatment:	-low salt diet   -monitor for signs of weight gain, shortness of breath and leg/foot swelling  Secondary Diagnosis:	Atrial fibrillation  Goal:	rate control and prevention of embolic episodes  Assessment and plan of treatment:	-continue anticoagulation with Eliquis  Secondary Diagnosis:	HTN (hypertension)  Goal:	normotension BP <150/80  Assessment and plan of treatment:	-continue with lisinopril, lasix, amlodipine as prescribed   -your AMLODIPINE was increased from 2.5mg to 5mg daily  -your LISINOPRIL was increased from 20mg to 40mg daily  Secondary Diagnosis:	Small bowel obstruction  Goal:	pain free/SBO free  Assessment and plan of treatment:	-s/p Principal Discharge DX:	CHF exacerbation  Goal:	Prevent fluid overload. Maintain euvolemia. Ensure compliance with medications.  Assessment and plan of treatment:	-LASIX increased from 40mg DAILY to 60mg DAILY   -LISINOPRIL increased from 20mg DAILY to 40mg DAILY   -low salt diet with fluid restriction   -monitor for signs of weight gain, shortness of breath and leg/foot swelling  -follow up with cardiologist within 1 WEEK of discharge  Secondary Diagnosis:	Atrial fibrillation  Goal:	rate control and prevention of embolic episodes  Assessment and plan of treatment:	-continue anticoagulation with Eliquis  Secondary Diagnosis:	HTN (hypertension)  Goal:	normotension BP <150/80  Assessment and plan of treatment:	-continue with lisinopril, lasix, amlodipine as prescribed   -your AMLODIPINE was increased from 2.5mg to 5mg daily  -your LISINOPRIL was increased from 20mg to 40mg daily  -your LASIX was increased from 40mg to 60mg daily  Secondary Diagnosis:	Small bowel obstruction  Goal:	pain free/SBO free  Assessment and plan of treatment:	-s/p ex lap with MARIA ISABEL on 11/16   -outpatient follow up with PMD Principal Discharge DX:	CHF exacerbation  Goal:	Prevent fluid overload. Maintain euvolemia. Ensure compliance with medications.  Assessment and plan of treatment:	-LASIX increased from 40mg DAILY to 60mg DAILY   -LISINOPRIL increased from 20mg DAILY to 40mg DAILY   -low salt diet with fluid restriction   -monitor for signs of weight gain, shortness of breath and leg/foot swelling  -follow up with cardiologist within 1 WEEK of discharge  Secondary Diagnosis:	Atrial fibrillation  Goal:	rate control and prevention of embolic episodes  Assessment and plan of treatment:	-continue anticoagulation with Eliquis/Apixaban  Secondary Diagnosis:	HTN (hypertension)  Goal:	normotension BP <150/80  Assessment and plan of treatment:	-continue with lisinopril, lasix, amlodipine as prescribed   -your AMLODIPINE was increased from 2.5mg to 5mg daily  -your LISINOPRIL was increased from 20mg to 40mg daily  -your LASIX was increased from 40mg to 60mg daily  Secondary Diagnosis:	Small bowel obstruction  Goal:	pain free/SBO free  Assessment and plan of treatment:	-s/p ex lap with MARIA ISABEL on 11/16   -outpatient follow up with PMD

## 2018-12-01 NOTE — DISCHARGE NOTE ADULT - OTHER SIGNIFICANT FINDINGS
TTE:  EF 55%  1. Mitral annular calcification. Moderate mitral  regurgitation.  2. Normal trileaflet aortic valve.  3. Aortic Root: 3.3 cm.  4. Severe left atrial enlargement.  5. Severe concentric left ventricular hypertrophy.  6. Normal Left Ventricular Systolic Function,  (EF = 55%)  7. Grade III diastolic dysfunction.  8. Normal right atrium.  9. Normal right ventricular size and function.  10. RV systolic pressure is mildly increased at  41 mm Hg.  11. There is mild tricuspid regurgitation.  12. There is mild pulmonic regurgitation.  13. Normal pericardium with no pericardial effusion.  14. Left pleural effusion.    LE U/S:  No evidence of femoropopliteal deep vein thrombosis in either lower extremity.    CTAP with IV contrast:  Numerous dilated fluid-filled small bowel loops predominantly in the   pelvis with multiple converging transition points in the right lower   quadrant most compatible with a closed loop small bowel obstruction. Mild   thickening of a segment of the dilated small bowel in the right lower   quadrant with mesenteric edema and small volume ascites raises concern   for bowel ischemia.    Urine Culture: negative TTE:  EF 55%  1. Mitral annular calcification. Moderate mitral  regurgitation.  2. Normal trileaflet aortic valve.  3. Aortic Root: 3.3 cm.  4. Severe left atrial enlargement.  5. Severe concentric left ventricular hypertrophy.  6. Normal Left Ventricular Systolic Function,  (EF = 55%)  7. Grade III diastolic dysfunction.  8. Normal right atrium.  9. Normal right ventricular size and function.  10. RV systolic pressure is mildly increased at  41 mm Hg.  11. There is mild tricuspid regurgitation.  12. There is mild pulmonic regurgitation.  13. Normal pericardium with no pericardial effusion.  14. Left pleural effusion.    LE U/S:  No evidence of femoropopliteal deep vein thrombosis in either lower extremity.    Urine Culture: negative

## 2018-12-01 NOTE — DISCHARGE NOTE ADULT - HOSPITAL COURSE
89 y/o F with h/o HTN, HLD, a. fib on eliquis, CHF (EF 55%), recent SBO s/p 11/16 exploratory laparotomy w/ MARIA ISABEL presents to the ED for shortness of breath and bilateral lower extremity edema. Admitted to telemetry to acute heart failure exacerbation. EKG: Afib @60bpm with RBBB, LAD. ProBNP 16940. CXR: Mild pulmonary edema and cardiomegaly. TTE: EF 55%. Moderate mitral regurgitation. Severe LA enlargement. Severe concentric LVH. Normal LV Systolic Function,  (EF = 55%). RV systolic pressure is mildly increased at  41 mm Hg. Grade III diastolic dysfunction. Left pleural effusion (full report below). Patient IV diuresed with Lasix 20 IV BID. Lisinopril increased from 20mg daily (home) to 40mg daily. On 12/1, patient bradycardic to 37bpm- metoprolol held- will hold outpatient atenolol until outpatient follow-up with cardiologist.     Patient discussed with Dr. Cristobal and with Dr. Durand- the patient is currently medically stable for discharge and has appropriate follow-up. 89 y/o F with h/o HTN, HLD, a. fib on eliquis, CHF (EF 55%), recent SBO s/p 11/16 exploratory laparotomy w/ MARIA ISABEL presents to the ED for shortness of breath and bilateral lower extremity edema. Admitted to telemetry to acute heart failure exacerbation. EKG: Afib @60bpm with RBBB, LAD. ProBNP 19743. CXR: Mild pulmonary edema and cardiomegaly. TTE: EF 55%. Moderate mitral regurgitation. Severe LA enlargement. Severe concentric LVH. Normal LV Systolic Function,  (EF = 55%). RV systolic pressure is mildly increased at  41 mm Hg. Grade III diastolic dysfunction. Left pleural effusion (full report below). Patient IV diuresed with Lasix 20 IV BID. Lisinopril increased from 20mg daily (home) to 40mg daily. On 12/1, patient bradycardic to 37bpm- metoprolol held- will hold outpatient atenolol until outpatient follow-up with cardiologist.     Patient discussed with Dr. Cristobal and with Dr. Durand- the patient is currently medically stable for discharge and has appropriate follow-up.     *PHYSICAL THERAPY 89 y/o F with h/o HTN, HLD, a. fib on eliquis, CHF (EF 55%), recent SBO s/p 11/16 exploratory laparotomy w/ MARIA ISABEL presents to the ED for shortness of breath and bilateral lower extremity edema. Admitted to telemetry to acute heart failure exacerbation. EKG: Afib @60bpm with RBBB, LAD. ProBNP 88813. CXR: Mild pulmonary edema and cardiomegaly. TTE: EF 55%. Moderate mitral regurgitation. Severe LA enlargement. Severe concentric LVH. Normal LV Systolic Function,  (EF = 55%). RV systolic pressure is mildly increased at  41 mm Hg. Grade III diastolic dysfunction. Left pleural effusion (full report below). Patient IV diuresed with Lasix 20 IV BID. Lisinopril increased from 20mg daily (home) to 40mg daily. On 12/1, patient bradycardic to 37bpm- metoprolol held- will hold outpatient atenolol until outpatient follow-up with cardiologist. As per physical therapy, patient does not need PT services and must use walker at all times.     Patient discussed with Dr. Cristobal and with Dr. Durand- the patient is currently medically stable for discharge and has appropriate follow-up.

## 2018-12-01 NOTE — DISCHARGE NOTE ADULT - ADDITIONAL INSTRUCTIONS
Please follow up with Dr. Brown (CARDIOLOGIST) within 1-2 weeks of discharge [phone]. Please follow up with your primary care doctor within 1-2 weeks of discharge.    Medication changes:   -STOP taking ATENOLOL  -your LASIX was SWITCHED from 40mg DAILY to 60mg DAILY   -LISINOPRIL increased rom 20mg to 40mg DAILY     If you experience chest pain, shortness of breath or worsening leg swelling, please return to the hospital immediately. Please follow up with Dr. Brown (CARDIOLOGIST) within 1-2 weeks of discharge [phone]. Please follow up with your primary care doctor within 1 week of discharge.      Medication changes:   -STOP taking ATENOLOL  -your LASIX was SWITCHED from 40mg DAILY to 60mg DAILY   -LISINOPRIL increased rom 20mg to 40mg DAILY     If you experience chest pain, shortness of breath or worsening leg swelling, please return to the hospital immediately. Please follow up with Dr. Brown (CARDIOLOGIST) within 1-2 weeks of discharge [phone]. Please follow up with your primary care doctor within 1 week of discharge.   YOU MUST USE YOUR WALKER AT ALL TIMES TO MOVE AROUND       Medication changes:   -STOP taking ATENOLOL  -your LASIX was SWITCHED from 40mg DAILY to 60mg DAILY   -LISINOPRIL increased rom 20mg to 40mg DAILY     If you experience chest pain, shortness of breath or worsening leg swelling, please return to the hospital immediately.

## 2018-12-01 NOTE — PROGRESS NOTE ADULT - ASSESSMENT
EKG: Afib 60 RBBB, LAD     Echo: < from: Transthoracic Echocardiogram (11.17.18 @ 10:42) >  1. Mitral annular calcification. Moderate mitral  regurgitation.  2. Normal trileaflet aortic valve.  3. Aortic Root: 3.3 cm.  4. Severe left atrial enlargement.  5. Severe concentric left ventricular hypertrophy.  6. Normal Left Ventricular Systolic Function,  (EF = 55%)  7. Grade III diastolic dysfunction.  8. Normal right atrium.  9. Normal right ventricular size and function.  10. RV systolic pressure is mildly increased at41 mm Hg.  11. There is mild tricuspid regurgitation.  12. There is mild pulmonic regurgitation.  13. Normal pericardium with no pericardial effusion.  14. Left pleural effusion.    < end of copied text >      Assessment  and Plan     1) HFpEF: C/W Lasix 20 IV BID, keep  K>4, mag >2, monitor u/o and renal function, pt want to go home today, can switch to po     2) HTN: decrease metoprolol to 12.5 BID Lisinopril increased to 40mg po daily     3) Afib decrease metoprolol to 12.5 BID, eliquis 2.5 mg po BID    4) DVT PPX : Eliquis , LE dopplers

## 2018-12-07 ENCOUNTER — INPATIENT (INPATIENT)
Facility: HOSPITAL | Age: 83
LOS: 3 days | Discharge: HOME | DRG: 291 | End: 2018-12-11
Attending: INTERNAL MEDICINE | Admitting: INTERNAL MEDICINE
Payer: COMMERCIAL

## 2018-12-07 VITALS
OXYGEN SATURATION: 98 % | HEIGHT: 59 IN | HEART RATE: 81 BPM | WEIGHT: 123.9 LBS | RESPIRATION RATE: 22 BRPM | DIASTOLIC BLOOD PRESSURE: 79 MMHG | TEMPERATURE: 98 F | SYSTOLIC BLOOD PRESSURE: 136 MMHG

## 2018-12-07 DIAGNOSIS — I10 ESSENTIAL (PRIMARY) HYPERTENSION: ICD-10-CM

## 2018-12-07 DIAGNOSIS — K56.609 UNSPECIFIED INTESTINAL OBSTRUCTION, UNSPECIFIED AS TO PARTIAL VERSUS COMPLETE OBSTRUCTION: ICD-10-CM

## 2018-12-07 DIAGNOSIS — I50.9 HEART FAILURE, UNSPECIFIED: ICD-10-CM

## 2018-12-07 DIAGNOSIS — E87.1 HYPO-OSMOLALITY AND HYPONATREMIA: ICD-10-CM

## 2018-12-07 DIAGNOSIS — I50.33 ACUTE ON CHRONIC DIASTOLIC (CONGESTIVE) HEART FAILURE: ICD-10-CM

## 2018-12-07 DIAGNOSIS — Z90.49 ACQUIRED ABSENCE OF OTHER SPECIFIED PARTS OF DIGESTIVE TRACT: Chronic | ICD-10-CM

## 2018-12-07 DIAGNOSIS — I48.91 UNSPECIFIED ATRIAL FIBRILLATION: ICD-10-CM

## 2018-12-07 DIAGNOSIS — I34.0 NONRHEUMATIC MITRAL (VALVE) INSUFFICIENCY: ICD-10-CM

## 2018-12-07 DIAGNOSIS — Z90.710 ACQUIRED ABSENCE OF BOTH CERVIX AND UTERUS: Chronic | ICD-10-CM

## 2018-12-07 PROBLEM — E78.5 HYPERLIPIDEMIA, UNSPECIFIED: Chronic | Status: ACTIVE | Noted: 2018-11-27

## 2018-12-07 LAB
ALBUMIN SERPL ELPH-MCNC: 2.9 G/DL — LOW (ref 3.3–5)
ALP SERPL-CCNC: 127 U/L — HIGH (ref 40–120)
ALT FLD-CCNC: 18 U/L — SIGNIFICANT CHANGE UP (ref 10–45)
ANION GAP SERPL CALC-SCNC: 13 MMOL/L — SIGNIFICANT CHANGE UP (ref 5–17)
APPEARANCE UR: CLEAR — SIGNIFICANT CHANGE UP
APTT BLD: 31.4 SEC — SIGNIFICANT CHANGE UP (ref 27.5–36.3)
AST SERPL-CCNC: 46 U/L — HIGH (ref 10–40)
BACTERIA # UR AUTO: NEGATIVE — SIGNIFICANT CHANGE UP
BASOPHILS # BLD AUTO: 0 K/UL — SIGNIFICANT CHANGE UP (ref 0–0.2)
BILIRUB SERPL-MCNC: 1.2 MG/DL — SIGNIFICANT CHANGE UP (ref 0.2–1.2)
BILIRUB UR-MCNC: NEGATIVE — SIGNIFICANT CHANGE UP
BUN SERPL-MCNC: 10 MG/DL — SIGNIFICANT CHANGE UP (ref 7–23)
CALCIUM SERPL-MCNC: 8.5 MG/DL — SIGNIFICANT CHANGE UP (ref 8.4–10.5)
CHLORIDE SERPL-SCNC: 91 MMOL/L — LOW (ref 96–108)
CO2 SERPL-SCNC: 24 MMOL/L — SIGNIFICANT CHANGE UP (ref 22–31)
COLOR SPEC: SIGNIFICANT CHANGE UP
CREAT SERPL-MCNC: 0.57 MG/DL — SIGNIFICANT CHANGE UP (ref 0.5–1.3)
DIFF PNL FLD: NEGATIVE — SIGNIFICANT CHANGE UP
EOSINOPHIL # BLD AUTO: 0.1 K/UL — SIGNIFICANT CHANGE UP (ref 0–0.5)
EOSINOPHIL NFR BLD AUTO: 1 % — SIGNIFICANT CHANGE UP (ref 0–6)
EPI CELLS # UR: 1 /HPF — SIGNIFICANT CHANGE UP
GLUCOSE BLDC GLUCOMTR-MCNC: 100 MG/DL — HIGH (ref 70–99)
GLUCOSE SERPL-MCNC: 124 MG/DL — HIGH (ref 70–99)
GLUCOSE UR QL: NEGATIVE — SIGNIFICANT CHANGE UP
HCT VFR BLD CALC: 38.8 % — SIGNIFICANT CHANGE UP (ref 34.5–45)
HGB BLD-MCNC: 12.9 G/DL — SIGNIFICANT CHANGE UP (ref 11.5–15.5)
HYALINE CASTS # UR AUTO: 0 /LPF — SIGNIFICANT CHANGE UP (ref 0–2)
INR BLD: 1.66 RATIO — HIGH (ref 0.88–1.16)
KETONES UR-MCNC: NEGATIVE — SIGNIFICANT CHANGE UP
LEUKOCYTE ESTERASE UR-ACNC: NEGATIVE — SIGNIFICANT CHANGE UP
LYMPHOCYTES # BLD AUTO: 1.5 K/UL — SIGNIFICANT CHANGE UP (ref 1–3.3)
LYMPHOCYTES # BLD AUTO: 19 % — SIGNIFICANT CHANGE UP (ref 13–44)
MCHC RBC-ENTMCNC: 31.6 PG — SIGNIFICANT CHANGE UP (ref 27–34)
MCHC RBC-ENTMCNC: 33.3 GM/DL — SIGNIFICANT CHANGE UP (ref 32–36)
MCV RBC AUTO: 94.8 FL — SIGNIFICANT CHANGE UP (ref 80–100)
MONOCYTES # BLD AUTO: 1.6 K/UL — HIGH (ref 0–0.9)
MONOCYTES NFR BLD AUTO: 21 % — HIGH (ref 2–14)
NEUTROPHILS # BLD AUTO: 4.5 K/UL — SIGNIFICANT CHANGE UP (ref 1.8–7.4)
NEUTROPHILS NFR BLD AUTO: 54 % — SIGNIFICANT CHANGE UP (ref 43–77)
NITRITE UR-MCNC: NEGATIVE — SIGNIFICANT CHANGE UP
NT-PROBNP SERPL-SCNC: 6331 PG/ML — HIGH (ref 0–300)
PH UR: 7.5 — SIGNIFICANT CHANGE UP (ref 5–8)
PLATELET # BLD AUTO: 357 K/UL — SIGNIFICANT CHANGE UP (ref 150–400)
POTASSIUM SERPL-MCNC: 4.3 MMOL/L — SIGNIFICANT CHANGE UP (ref 3.5–5.3)
POTASSIUM SERPL-SCNC: 4.3 MMOL/L — SIGNIFICANT CHANGE UP (ref 3.5–5.3)
PROT SERPL-MCNC: 6.7 G/DL — SIGNIFICANT CHANGE UP (ref 6–8.3)
PROT UR-MCNC: NEGATIVE — SIGNIFICANT CHANGE UP
PROTHROM AB SERPL-ACNC: 19.2 SEC — HIGH (ref 10–12.9)
RBC # BLD: 4.09 M/UL — SIGNIFICANT CHANGE UP (ref 3.8–5.2)
RBC # FLD: 14.1 % — SIGNIFICANT CHANGE UP (ref 10.3–14.5)
RBC CASTS # UR COMP ASSIST: 2 /HPF — SIGNIFICANT CHANGE UP (ref 0–4)
SODIUM SERPL-SCNC: 128 MMOL/L — LOW (ref 135–145)
SODIUM UR-SCNC: 106 MMOL/L — SIGNIFICANT CHANGE UP
SP GR SPEC: 1.01 — LOW (ref 1.01–1.02)
TROPONIN T, HIGH SENSITIVITY RESULT: 78 NG/L — HIGH (ref 0–51)
UROBILINOGEN FLD QL: NEGATIVE — SIGNIFICANT CHANGE UP
WBC # BLD: 7.7 K/UL — SIGNIFICANT CHANGE UP (ref 3.8–10.5)
WBC # FLD AUTO: 7.7 K/UL — SIGNIFICANT CHANGE UP (ref 3.8–10.5)
WBC UR QL: 1 /HPF — SIGNIFICANT CHANGE UP (ref 0–5)

## 2018-12-07 PROCEDURE — 74019 RADEX ABDOMEN 2 VIEWS: CPT | Mod: 26

## 2018-12-07 PROCEDURE — 71045 X-RAY EXAM CHEST 1 VIEW: CPT | Mod: 26

## 2018-12-07 PROCEDURE — 99285 EMERGENCY DEPT VISIT HI MDM: CPT

## 2018-12-07 PROCEDURE — 99223 1ST HOSP IP/OBS HIGH 75: CPT

## 2018-12-07 RX ORDER — LISINOPRIL 2.5 MG/1
40 TABLET ORAL DAILY
Qty: 0 | Refills: 0 | Status: DISCONTINUED | OUTPATIENT
Start: 2018-12-07 | End: 2018-12-11

## 2018-12-07 RX ORDER — MULTIVIT-MIN/FERROUS GLUCONATE 9 MG/15 ML
1 LIQUID (ML) ORAL DAILY
Qty: 0 | Refills: 0 | Status: DISCONTINUED | OUTPATIENT
Start: 2018-12-07 | End: 2018-12-11

## 2018-12-07 RX ORDER — MULTIVIT-MIN/FERROUS GLUCONATE 9 MG/15 ML
1 LIQUID (ML) ORAL
Qty: 0 | Refills: 0 | COMMUNITY

## 2018-12-07 RX ORDER — SODIUM CHLORIDE 0.65 %
1 AEROSOL, SPRAY (ML) NASAL
Qty: 0 | Refills: 0 | COMMUNITY

## 2018-12-07 RX ORDER — ACETAMINOPHEN 500 MG
650 TABLET ORAL EVERY 6 HOURS
Qty: 0 | Refills: 0 | Status: DISCONTINUED | OUTPATIENT
Start: 2018-12-07 | End: 2018-12-11

## 2018-12-07 RX ORDER — APIXABAN 2.5 MG/1
2.5 TABLET, FILM COATED ORAL EVERY 12 HOURS
Qty: 0 | Refills: 0 | Status: DISCONTINUED | OUTPATIENT
Start: 2018-12-07 | End: 2018-12-11

## 2018-12-07 RX ORDER — POTASSIUM CHLORIDE 20 MEQ
1 PACKET (EA) ORAL
Qty: 0 | Refills: 0 | COMMUNITY

## 2018-12-07 RX ORDER — FUROSEMIDE 40 MG
20 TABLET ORAL
Qty: 0 | Refills: 0 | Status: DISCONTINUED | OUTPATIENT
Start: 2018-12-08 | End: 2018-12-10

## 2018-12-07 RX ORDER — DOCUSATE SODIUM 100 MG
100 CAPSULE ORAL THREE TIMES A DAY
Qty: 0 | Refills: 0 | Status: DISCONTINUED | OUTPATIENT
Start: 2018-12-07 | End: 2018-12-11

## 2018-12-07 RX ORDER — ACETAMINOPHEN 500 MG
1 TABLET ORAL
Qty: 0 | Refills: 0 | COMMUNITY

## 2018-12-07 RX ORDER — FUROSEMIDE 40 MG
40 TABLET ORAL ONCE
Qty: 0 | Refills: 0 | Status: COMPLETED | OUTPATIENT
Start: 2018-12-07 | End: 2018-12-07

## 2018-12-07 RX ORDER — ONDANSETRON 8 MG/1
4 TABLET, FILM COATED ORAL EVERY 6 HOURS
Qty: 0 | Refills: 0 | Status: DISCONTINUED | OUTPATIENT
Start: 2018-12-07 | End: 2018-12-11

## 2018-12-07 RX ADMIN — Medication 40 MILLIGRAM(S): at 16:00

## 2018-12-07 RX ADMIN — APIXABAN 2.5 MILLIGRAM(S): 2.5 TABLET, FILM COATED ORAL at 21:40

## 2018-12-07 RX ADMIN — Medication 100 MILLIGRAM(S): at 21:40

## 2018-12-07 NOTE — ED ADULT NURSE NOTE - NSIMPLEMENTINTERV_GEN_ALL_ED
Implemented All Fall Risk Interventions:  [place] to call system. Call bell, personal items and telephone within reach. Instruct patient to call for assistance. Room bathroom lighting operational. Non-slip footwear when patient is off stretcher. Physically safe environment: no spills, clutter or unnecessary equipment. Stretcher in lowest position, wheels locked, appropriate side rails in place. Provide visual cue, wrist band, yellow gown, etc. Monitor gait and stability. Monitor for mental status changes and reorient to person, place, and time. Review medications for side effects contributing to fall risk. Reinforce activity limits and safety measures with patient and family.

## 2018-12-07 NOTE — H&P ADULT - PROBLEM SELECTOR PLAN 1
Pt received one dose of IV Lasix 40 mg X one in the ED   Was on lasix 60 mg oral daily at home   Last EF was done 11/17/18 with EF of 55% and Concentric LVH, Diastolic Heart Failure   COnt IV Lasix 20 mg IV BID  Monitor daily weight/ intake and output/ 1.5L fluid restriction   Cont Lisinopril   Pt is not BB / As per previous admission , patient had Bradycardia on BB which was held from last admission   CARDIOLOGY consult to be CALLED In AM or overnight if patient decompensates   cont to closely monitor patient  might need potassium supplementation Pt received one dose of IV Lasix 40 mg X one in the ED   Was on lasix 60 mg oral daily at home   Last EF was done 11/17/18 with EF of 55% and Concentric LVH, Diastolic Heart Failure   COnt IV Lasix 20 mg IV BID  Monitor daily weight/ intake and output/ 1.5L fluid restriction   Cont Lisinopril   Pt is not BB / As per previous admission , patient had Bradycardia on BB which was held from last admission   CARDIOLOGY consult to be CALLED In AM or overnight if patient decompensates   cont to closely monitor patient  might need potassium supplementation  HOLD NOrvasc for now and restart as needed   Repeat Troponin tonight

## 2018-12-07 NOTE — H&P ADULT - NSHPLABSRESULTS_GEN_ALL_CORE
Lab results/ CXR and Lab from the outside hospital was reviewed :  WBC = 7.7  H/H = 12.9/38.8  PLT = 357  Na = 124 ( was 134 at OSH) k= 4.3   BNP = 6331  CXR with RT Pleural Effusion

## 2018-12-07 NOTE — ED ADULT NURSE NOTE - CHIEF COMPLAINT QUOTE
we took her out of [facility] on tuesday; her doctor wants her admitted because her lab work was abnormal

## 2018-12-07 NOTE — H&P ADULT - GASTROINTESTINAL COMMENTS
pos midline healing surgical wound with staples on the proximal aspect and open wound on the mid section with no erythema nor D/C

## 2018-12-07 NOTE — H&P ADULT - ATTENDING COMMENTS
Please f/p ABd Xrays overnight / F/UP repeat BMP and serum sodium / Please CALL CARDIO CONSULT in AM if pt remains stable/ closely monitor patient     Sindi Hill   Hospitalist   [phone]

## 2018-12-07 NOTE — ED PROVIDER NOTE - PMH
Arthritis    Atrial fibrillation    CHF (congestive heart failure)    HTN (hypertension)    Hyperlipidemia    Hypertension    Small bowel obstruction

## 2018-12-07 NOTE — ED ADULT NURSE NOTE - OBJECTIVE STATEMENT
89 year old female BIB wheelchair from home. PMHX of HTN, CHF, and Afib. Pt's son reports pt had SBO surgery November 16, 2018. After surgery, pt was discharged home and begin vomiting and having diarrhea x3days ago; denies blood in emesis or stool. Pt states she has orthopnea and sleeps with 3 pillows every night. Pt's son reports pt cannot hold down her medications. Pt was brought to her doctor and labs were taken. According to her MD, "BNP lab results were abnormal" and was told to bring patient to the hospital. Pt presents with SOB, with crackles in lower lungs bilaterally. +1 edema in her LLE bilaterally. Pt has a productive cough with yellow sputum; denies fever/chills. Vital signs stable, patient is resting comfortably in bed in lowest position, side rails up and with son at the bedside.

## 2018-12-07 NOTE — ED ADULT TRIAGE NOTE - CHIEF COMPLAINT QUOTE
we took her out of [facility] on tuesday; her doctor wants her admitted because her lab work was abnormal

## 2018-12-07 NOTE — H&P ADULT - HISTORY OF PRESENT ILLNESS
89y f w PMhx HTN, DM, recent volvulus around fallopian tube s/p decompression, SBO s/p 11/16 exploratory laparotomy w/ MARIA ISABEL, recent diagnosis of CHFpEF ( EF= 55 % in 11/2018), afib on elequis, p/w elevated BNP to 8700. Pt was signed out AMA from [facility], where she had been admitted for intractable nausea/vomiting w/ abd pain earlier this week; when PMD saw most recent lab results, she instructed family to bring pt back to hospital for re-admission. Family was unhappy w/ [facility] and so opted to bring pt here. Pt is no longer vomiting. She has pitting edema +4 in lower extremities, significant cough.  of note, patient was recently in the hospital for (11/27/18-12/1/18 ) for CHF. 89y f w PMhx HTN, DM, recent volvulus around fallopian tube s/p decompression, SBO s/p 11/16 exploratory laparotomy w/ MARIA ISABEL, recent diagnosis of CHFpEF ( EF= 55 % in 11/2018), afib on elequis, p/w elevated BNP to 8700. Pt was signed out AMA from [facility], where she had been admitted for intractable nausea/vomiting w/ abd pain earlier this week; when PMD saw most recent lab results, she instructed family to bring pt back to hospital for re-admission. Family was unhappy w/ [facility] and so opted to bring pt here. Pt is no longer vomiting. She has pitting edema +4 in lower extremities, significant cough.  of note, patient was recently in the hospital for (11/27/18-12/1/18 ) for CHF.       From [facility] : BNP was 8744  Na was 135  cr 0.77  wbc = 9.8  Lipase = 82  CT of the abd with IV contrast with Cardiomegaly with Rt pleural Effusion , Ascites, diffuse edema within the subcutaneous soft tissues consistent with Anasarca ,  Colonic Diverticulosis / no mention about small bowel.   ABd Xrays done and revealed Dilatation of the small bowel suggestive of an ileus or incomplete obstruction.      ED :  Tc = 97.6F  HR = 81  BP = 136/79  RR = 22 SPO2 = 98% RA 89y f w PMhx HTN, Recent volvulus around fallopian tube s/p decompression, SBO s/p 11/16 exploratory laparotomy w/ MARIA ISABEL, recent diagnosis of CHFpEF ( EF= 55 % in 11/2018), Afib on elequis, p/w elevated BNP to 8700 which was done from [facility]. Patient was recently D/C from [facility] due to acute heart failure on 12/1/18.  On 12/4/18, patient was admitted to [facility] for Nausea and Vomiting and  patient's sone signed her out AMA from [facility].  Sone states that patient continues to have cough with whitish phlegm and increased legs swelling and SOB at night and one day post signing out from hospital, he brought the patient to see her PMD .  The PMD saw most recent lab results, she instructed family to bring pt back to hospital for re-admission. Family was unhappy w/ [facility] and so opted to bring pt here. Pt is no longer vomiting. She has pitting edema +4 in lower extremities, significant cough , no fever , no diarrhea , no abd pain.    of note, patient was recently in the hospital for (11/27/18-12/1/18 ) for CHF.       Labs From [facility] : BNP was 8744  Na was 135  cr 0.77  wbc = 9.8  Lipase = 82  CT of the abd with IV contrast with Cardiomegaly with Rt pleural Effusion , Ascites, diffuse edema within the subcutaneous soft tissues consistent with Anasarca   Colonic Diverticulosis / no mention about small bowel.   ABd Xrays done and revealed Dilatation of the small bowel suggestive of an ileus or incomplete obstruction.      [facility] @[facility] :  Tc = 97.6F  HR = 81  BP = 136/79  RR = 22 SPO2 = 98% RA   WBC = 7.7  Na = 128  BUN = 10  Trop= 78  BNP = 6331 Patient received one dose of Lasix 40 mg IV X one

## 2018-12-07 NOTE — ED PROVIDER NOTE - ATTENDING CONTRIBUTION TO CARE
Attending MD Newell:  I personally have seen and examined this patient.  Resident note reviewed and agree on plan of care and except where noted.  See MDM for details.

## 2018-12-07 NOTE — H&P ADULT - NSHPSOURCEINFOTX_GEN_ALL_CORE
MOst of the history is provided by tab Campos who lives with patient Most of the history is provided by tab Campos who lives with patient and translate for patient

## 2018-12-07 NOTE — H&P ADULT - PROBLEM SELECTOR PLAN 3
MOst likely multifactorial ( CHF/s/P Vomiting )   No change in mentation as per son  Repeat BMP tonight

## 2018-12-07 NOTE — H&P ADULT - ASSESSMENT
89y f w PMhx HTN, DM, recent volvulus around fallopian tube s/p decompression, SBO s/p 11/16 exploratory laparotomy w/ MARIA ISABEL, recent diagnosis of CHFpEF ( EF= 55 % in 11/2018), afib on elequis, p/w elevated BNP to 8700 from outside hospital where patient was seen due to Nausea and vomiting. Patient is admitted with acute heart failure with preserved EF.

## 2018-12-07 NOTE — ED PROVIDER NOTE - OBJECTIVE STATEMENT
89y f w PMhx HTN, DM, recent volvulus around fallopian tube s/p decompression, recent diagnosis of CHF, p/w elevated BNP to 8700. Pt was signed out AMA from [facility], where she had been admitted for intractable nausea/vomiting w/ abd pain earlier this week; when PMD saw most recent lab results, she instructed family to bring pt back to hospital for re-admission. Family was unhappy w/ [facility] and so opted to bring pt here. Pt is no longer vomiting. She has pitting edema +4 in lower extremities, significant cough. 89y f w PMhx HTN, DM, recent volvulus around fallopian tube s/p decompression, recent diagnosis of CHF, afib on elequis, p/w elevated BNP to 8700. Pt was signed out AMA from [facility], where she had been admitted for intractable nausea/vomiting w/ abd pain earlier this week; when PMD saw most recent lab results, she instructed family to bring pt back to hospital for re-admission. Family was unhappy w/ [facility] and so opted to bring pt here. Pt is no longer vomiting. She has pitting edema +4 in lower extremities, significant cough.

## 2018-12-07 NOTE — ED PROVIDER NOTE - MEDICAL DECISION MAKING DETAILS
Attending MD Newell: 89 yo female with PMH for HTN, DM, recent abd surgery at [facility] for volvulus, complicated by new onset CHF, 4 days ago developed nausea and abd pain.  She went to [facility] was admitted by son signed her out AMA and came here. BNP was 8500. On exam A & O x 3, NAD, HEENT WNL and no facial asymmetry; lungs bilateral crackles and rhonchi, heart with reg rhythm without murmur; abdomen soft NTND; extremities with 4+ bilateral edema; skin with no rashes, neuro exam non focal with no motor or sensory deficits.

## 2018-12-07 NOTE — H&P ADULT - PROBLEM SELECTOR PLAN 6
ABd Xrays done from Outside hospital revealed Dilatation of the small bowel suggestive of an ileus or incomplete obstruction/ a CT of Abd with IV contrast was done with no mention of Small Bowel   Will repeat abd xrays / place patient on clear liquid diet / Please call surgery and place NPO if abd xrays reveals any SBO   Wound call consult

## 2018-12-08 LAB
ALBUMIN SERPL ELPH-MCNC: 2.5 G/DL — LOW (ref 3.3–5)
ALP SERPL-CCNC: 112 U/L — SIGNIFICANT CHANGE UP (ref 40–120)
ALT FLD-CCNC: 14 U/L — SIGNIFICANT CHANGE UP (ref 10–45)
ANION GAP SERPL CALC-SCNC: 12 MMOL/L — SIGNIFICANT CHANGE UP (ref 5–17)
ANION GAP SERPL CALC-SCNC: 13 MMOL/L — SIGNIFICANT CHANGE UP (ref 5–17)
AST SERPL-CCNC: 19 U/L — SIGNIFICANT CHANGE UP (ref 10–40)
BASOPHILS # BLD AUTO: 0.06 K/UL — SIGNIFICANT CHANGE UP (ref 0–0.2)
BASOPHILS NFR BLD AUTO: 0.9 % — SIGNIFICANT CHANGE UP (ref 0–2)
BILIRUB SERPL-MCNC: 1.3 MG/DL — HIGH (ref 0.2–1.2)
BUN SERPL-MCNC: 10 MG/DL — SIGNIFICANT CHANGE UP (ref 7–23)
BUN SERPL-MCNC: 10 MG/DL — SIGNIFICANT CHANGE UP (ref 7–23)
CALCIUM SERPL-MCNC: 8.3 MG/DL — LOW (ref 8.4–10.5)
CALCIUM SERPL-MCNC: 8.9 MG/DL — SIGNIFICANT CHANGE UP (ref 8.4–10.5)
CHLORIDE SERPL-SCNC: 93 MMOL/L — LOW (ref 96–108)
CHLORIDE SERPL-SCNC: 93 MMOL/L — LOW (ref 96–108)
CO2 SERPL-SCNC: 27 MMOL/L — SIGNIFICANT CHANGE UP (ref 22–31)
CO2 SERPL-SCNC: 27 MMOL/L — SIGNIFICANT CHANGE UP (ref 22–31)
CREAT SERPL-MCNC: 0.64 MG/DL — SIGNIFICANT CHANGE UP (ref 0.5–1.3)
CREAT SERPL-MCNC: 0.69 MG/DL — SIGNIFICANT CHANGE UP (ref 0.5–1.3)
CULTURE RESULTS: SIGNIFICANT CHANGE UP
EOSINOPHIL # BLD AUTO: 0.15 K/UL — SIGNIFICANT CHANGE UP (ref 0–0.5)
EOSINOPHIL NFR BLD AUTO: 2.2 % — SIGNIFICANT CHANGE UP (ref 0–6)
GLUCOSE BLDC GLUCOMTR-MCNC: 108 MG/DL — HIGH (ref 70–99)
GLUCOSE BLDC GLUCOMTR-MCNC: 111 MG/DL — HIGH (ref 70–99)
GLUCOSE BLDC GLUCOMTR-MCNC: 116 MG/DL — HIGH (ref 70–99)
GLUCOSE BLDC GLUCOMTR-MCNC: 93 MG/DL — SIGNIFICANT CHANGE UP (ref 70–99)
GLUCOSE SERPL-MCNC: 154 MG/DL — HIGH (ref 70–99)
GLUCOSE SERPL-MCNC: 99 MG/DL — SIGNIFICANT CHANGE UP (ref 70–99)
HCT VFR BLD CALC: 33 % — LOW (ref 34.5–45)
HGB BLD-MCNC: 11.4 G/DL — LOW (ref 11.5–15.5)
IMM GRANULOCYTES NFR BLD AUTO: 0.9 % — SIGNIFICANT CHANGE UP (ref 0–1.5)
LYMPHOCYTES # BLD AUTO: 0.64 K/UL — LOW (ref 1–3.3)
LYMPHOCYTES # BLD AUTO: 9.3 % — LOW (ref 13–44)
MAGNESIUM SERPL-MCNC: 1.6 MG/DL — SIGNIFICANT CHANGE UP (ref 1.6–2.6)
MCHC RBC-ENTMCNC: 31 PG — SIGNIFICANT CHANGE UP (ref 27–34)
MCHC RBC-ENTMCNC: 34.5 GM/DL — SIGNIFICANT CHANGE UP (ref 32–36)
MCV RBC AUTO: 89.7 FL — SIGNIFICANT CHANGE UP (ref 80–100)
MONOCYTES # BLD AUTO: 1.43 K/UL — HIGH (ref 0–0.9)
MONOCYTES NFR BLD AUTO: 20.8 % — HIGH (ref 2–14)
NEUTROPHILS # BLD AUTO: 4.52 K/UL — SIGNIFICANT CHANGE UP (ref 1.8–7.4)
NEUTROPHILS NFR BLD AUTO: 65.9 % — SIGNIFICANT CHANGE UP (ref 43–77)
PLATELET # BLD AUTO: 330 K/UL — SIGNIFICANT CHANGE UP (ref 150–400)
POTASSIUM SERPL-MCNC: 3.2 MMOL/L — LOW (ref 3.5–5.3)
POTASSIUM SERPL-MCNC: 3.6 MMOL/L — SIGNIFICANT CHANGE UP (ref 3.5–5.3)
POTASSIUM SERPL-SCNC: 3.2 MMOL/L — LOW (ref 3.5–5.3)
POTASSIUM SERPL-SCNC: 3.6 MMOL/L — SIGNIFICANT CHANGE UP (ref 3.5–5.3)
PROT SERPL-MCNC: 5.7 G/DL — LOW (ref 6–8.3)
RAPID RVP RESULT: SIGNIFICANT CHANGE UP
RBC # BLD: 3.68 M/UL — LOW (ref 3.8–5.2)
RBC # FLD: 15.8 % — HIGH (ref 10.3–14.5)
SODIUM SERPL-SCNC: 132 MMOL/L — LOW (ref 135–145)
SODIUM SERPL-SCNC: 133 MMOL/L — LOW (ref 135–145)
SPECIMEN SOURCE: SIGNIFICANT CHANGE UP
TROPONIN T, HIGH SENSITIVITY RESULT: 81 NG/L — HIGH (ref 0–51)
WBC # BLD: 6.86 K/UL — SIGNIFICANT CHANGE UP (ref 3.8–10.5)
WBC # FLD AUTO: 6.86 K/UL — SIGNIFICANT CHANGE UP (ref 3.8–10.5)

## 2018-12-08 PROCEDURE — 93010 ELECTROCARDIOGRAM REPORT: CPT

## 2018-12-08 PROCEDURE — 99223 1ST HOSP IP/OBS HIGH 75: CPT | Mod: GC

## 2018-12-08 RX ORDER — IPRATROPIUM/ALBUTEROL SULFATE 18-103MCG
3 AEROSOL WITH ADAPTER (GRAM) INHALATION EVERY 6 HOURS
Qty: 0 | Refills: 0 | Status: DISCONTINUED | OUTPATIENT
Start: 2018-12-08 | End: 2018-12-11

## 2018-12-08 RX ORDER — POTASSIUM CHLORIDE 20 MEQ
20 PACKET (EA) ORAL ONCE
Qty: 0 | Refills: 0 | Status: COMPLETED | OUTPATIENT
Start: 2018-12-08 | End: 2018-12-08

## 2018-12-08 RX ORDER — MAGNESIUM OXIDE 400 MG ORAL TABLET 241.3 MG
800 TABLET ORAL ONCE
Qty: 0 | Refills: 0 | Status: COMPLETED | OUTPATIENT
Start: 2018-12-08 | End: 2018-12-08

## 2018-12-08 RX ORDER — POTASSIUM CHLORIDE 20 MEQ
20 PACKET (EA) ORAL
Qty: 0 | Refills: 0 | Status: COMPLETED | OUTPATIENT
Start: 2018-12-08 | End: 2018-12-08

## 2018-12-08 RX ADMIN — Medication 650 MILLIGRAM(S): at 20:06

## 2018-12-08 RX ADMIN — Medication 100 MILLIGRAM(S): at 05:57

## 2018-12-08 RX ADMIN — Medication 100 MILLIGRAM(S): at 12:26

## 2018-12-08 RX ADMIN — Medication 20 MILLIEQUIVALENT(S): at 21:28

## 2018-12-08 RX ADMIN — Medication 3 MILLILITER(S): at 17:27

## 2018-12-08 RX ADMIN — Medication 1 TABLET(S): at 12:26

## 2018-12-08 RX ADMIN — Medication 100 MILLIGRAM(S): at 21:27

## 2018-12-08 RX ADMIN — Medication 1 DROP(S): at 16:35

## 2018-12-08 RX ADMIN — MAGNESIUM OXIDE 400 MG ORAL TABLET 800 MILLIGRAM(S): 241.3 TABLET ORAL at 12:26

## 2018-12-08 RX ADMIN — LISINOPRIL 40 MILLIGRAM(S): 2.5 TABLET ORAL at 05:57

## 2018-12-08 RX ADMIN — Medication 20 MILLIGRAM(S): at 05:57

## 2018-12-08 RX ADMIN — Medication 20 MILLIEQUIVALENT(S): at 04:02

## 2018-12-08 RX ADMIN — Medication 100 MILLIGRAM(S): at 16:34

## 2018-12-08 RX ADMIN — APIXABAN 2.5 MILLIGRAM(S): 2.5 TABLET, FILM COATED ORAL at 17:26

## 2018-12-08 RX ADMIN — Medication 20 MILLIGRAM(S): at 17:26

## 2018-12-08 RX ADMIN — Medication 20 MILLIEQUIVALENT(S): at 23:09

## 2018-12-08 RX ADMIN — APIXABAN 2.5 MILLIGRAM(S): 2.5 TABLET, FILM COATED ORAL at 05:57

## 2018-12-08 RX ADMIN — Medication 3 MILLILITER(S): at 23:09

## 2018-12-08 RX ADMIN — Medication 650 MILLIGRAM(S): at 20:40

## 2018-12-08 NOTE — PROGRESS NOTE ADULT - SUBJECTIVE AND OBJECTIVE BOX
Ladarius Brown MD  Interventional Cardiology  [place] Office : [address] [place] [address]. [address]  Tel:   [place] Office : [address] [facility] [place]. [address]  Tel: [phone]  Cell : [phone]    Subjective : Pt lying in bed comfortable, not in distress, son at bedside   	  MEDICATIONS:  apixaban 2.5 milliGRAM(s) Oral every 12 hours  furosemide   Injectable 20 milliGRAM(s) IV Push two times a day  lisinopril 40 milliGRAM(s) Oral daily      ALBUTerol/ipratropium for Nebulization 3 milliLiter(s) Nebulizer every 6 hours  guaiFENesin   Syrup  (Sugar-Free) 100 milliGRAM(s) Oral every 6 hours PRN    acetaminophen   Tablet .. 650 milliGRAM(s) Oral every 6 hours PRN  ondansetron Injectable 4 milliGRAM(s) IV Push every 6 hours PRN    docusate sodium 100 milliGRAM(s) Oral three times a day      artificial  tears Solution 1 Drop(s) Both EYES daily  multivitamin/minerals 1 Tablet(s) Oral daily      PHYSICAL EXAM:  T(C): 36.6 (12-08-18 @ 13:41), Max: 36.8 (12-08-18 @ 04:42)  HR: 92 (12-08-18 @ 17:23) (70 - 92)  BP: 119/61 (12-08-18 @ 17:23) (119/61 - 146/79)  RR: 18 (12-08-18 @ 17:23) (18 - 18)  SpO2: 96% (12-08-18 @ 17:23) (96% - 98%)  Wt(kg): --  I&O's Summary    08 Dec 2018 07:01  -  08 Dec 2018 18:43  --------------------------------------------------------  IN: 385 mL / OUT: 2 mL / NET: 383 mL      Height (cm): 64 (12-08 @ 12:08)      HEENT:   Normal oral mucosa, PERRL, EOMI	  Cardiovascular: Normal S1 S2, No JVD systolic murmur +, No edema  Respiratory: b/l basal creps 	  Gastrointestinal:  Soft, Non-tender, + BS	  Extremities: No clubbing, cyanosis or edema                                    11.4   6.86  )-----------( 330      ( 08 Dec 2018 11:54 )             33.0     12-08    132<L>  |  93<L>  |  10  ----------------------------<  154<H>  3.2<L>   |  27  |  0.69    Ca    8.3<L>      08 Dec 2018 10:09  Mg     1.6     12-08    TPro  5.7<L>  /  Alb  2.5<L>  /  TBili  1.3<H>  /  DBili  x   /  AST  19  /  ALT  14  /  AlkPhos  112  12-08    proBNP:   Lipid Profile:   HgA1c:   TSH:

## 2018-12-08 NOTE — CONSULT NOTE ADULT - SUBJECTIVE AND OBJECTIVE BOX
CHIEF COMPLAINT: phlegm sob    HPI:  89 F with htn, grade III diastolic HF, recent SBO s/p decompression and exploratory lapratomy requiring MARIA ISABEL  89y f w PMhx HTN, Recent volvulus around fallopian tube s/p decompression, SBO s/p  exploratory laparotomy w/ MARIA ISABEL, recent diagnosis of CHFpEF ( EF= 55 % in 2018), Afib on elequis      PAST MEDICAL & SURGICAL HISTORY:  Small bowel obstruction  CHF (congestive heart failure)  Atrial fibrillation  Hypertension  Hyperlipidemia  Arthritis  HTN (hypertension)  H/O abdominal hysterectomy  History of cholecystectomy      FAMILY HISTORY:  No pertinent family history in first degree relatives      SOCIAL HISTORY:  Smoking: [ ] Never Smoked [ ] Former Smoker (__ packs x ___ years) [ ] Current Smoker  (__ packs x ___ years)  Substance Use: [ ] Never Used [ ] Used ____  EtOH Use:  Marital Status: [ ] Single [ ]  [ ]  [ ]   Sexual History:   Occupation:  Recent Travel:  Country of Birth:  Advance Directives:    Allergies    No Known Allergies    Intolerances        HOME MEDICATIONS:  Home Medications:  Centrum oral tablet: 1 tab(s) orally once a day (07 Dec 2018 17:52)  ocular lubricant ophthalmic solution: 1 drop(s) to each affected eye once a day, As Needed (07 Dec 2018 17:52)  potassium chloride 10 mEq oral capsule, extended release: 1 tab(s) orally once a day (07 Dec 2018 17:52)  Saline Mist 0.65% nasal spray: 1 spray(s) nasal once a day, As Needed (07 Dec 2018 17:52)  Tylenol 8 Hour 650 mg oral tablet, extended release: 1 tab(s) orally , As Needed (07 Dec 2018 17:52)      REVIEW OF SYSTEMS:  Constitutional: [ ] negative [ ] fevers [ ] chills [ ] weight loss [ ] weight gain  HEENT: [ ] negative [ ] dry eyes [ ] eye irritation [ ] postnasal drip [ ] nasal congestion  CV: [ ] negative  [ ] chest pain [ ] orthopnea [ ] palpitations [ ] murmur  Resp: [ ] negative [ ] cough [ ] shortness of breath [ ] dyspnea [ ] wheezing [ ] sputum [ ] hemoptysis  GI: [ ] negative [ ] nausea [ ] vomiting [ ] diarrhea [ ] constipation [ ] abd pain [ ] dysphagia   : [ ] negative [ ] dysuria [ ] nocturia [ ] hematuria [ ] increased urinary frequency  Musculoskeletal: [ ] negative [ ] back pain [ ] myalgias [ ] arthralgias [ ] fracture  Skin: [ ] negative [ ] rash [ ] itch  Neurological: [ ] negative [ ] headache [ ] dizziness [ ] syncope [ ] weakness [ ] numbness  Psychiatric: [ ] negative [ ] anxiety [ ] depression  Endocrine: [ ] negative [ ] diabetes [ ] thyroid problem  Hematologic/Lymphatic: [ ] negative [ ] anemia [ ] bleeding problem  Allergic/Immunologic: [ ] negative [ ] itchy eyes [ ] nasal discharge [ ] hives [ ] angioedema  [ ] All other systems negative  [ ] Unable to assess ROS because ________    OBJECTIVE:  ICU Vital Signs Last 24 Hrs  T(C): 26.5 (08 Dec 2018 08:33), Max: 36.8 (07 Dec 2018 17:06)  T(F): 79.7 (08 Dec 2018 08:33), Max: 98.3 (07 Dec 2018 17:06)  HR: 82 (08 Dec 2018 08:33) (69 - 82)  BP: 146/79 (08 Dec 2018 08:33) (122/75 - 146/79)  BP(mean): --  ABP: --  ABP(mean): --  RR: 18 (08 Dec 2018 08:33) (18 - 22)  SpO2: 98% (08 Dec 2018 08:33) (96% - 98%)        CAPILLARY BLOOD GLUCOSE      POCT Blood Glucose.: 111 mg/dL (08 Dec 2018 11:51)      PHYSICAL EXAM:  General:   HEENT:   Lymph Nodes:  Neck:   Respiratory:   Cardiovascular:   Abdomen:   Extremities:   Skin:   Neurological:  Psychiatry:    LINES:     HOSPITAL MEDICATIONS:  Standing Meds:  ALBUTerol/ipratropium for Nebulization 3 milliLiter(s) Nebulizer every 6 hours  apixaban 2.5 milliGRAM(s) Oral every 12 hours  artificial  tears Solution 1 Drop(s) Both EYES daily  docusate sodium 100 milliGRAM(s) Oral three times a day  furosemide   Injectable 20 milliGRAM(s) IV Push two times a day  lisinopril 40 milliGRAM(s) Oral daily  multivitamin/minerals 1 Tablet(s) Oral daily      PRN Meds:  acetaminophen   Tablet .. 650 milliGRAM(s) Oral every 6 hours PRN  guaiFENesin   Syrup  (Sugar-Free) 100 milliGRAM(s) Oral every 6 hours PRN  ondansetron Injectable 4 milliGRAM(s) IV Push every 6 hours PRN      LABS:                        12.9   7.7   )-----------( 357      ( 07 Dec 2018 15:26 )             38.8     Hgb Trend: 12.9<--  12-08    132<L>  |  93<L>  |  10  ----------------------------<  154<H>  3.2<L>   |  27  |  0.69    Ca    8.3<L>      08 Dec 2018 10:09  Mg     1.6         TPro  5.7<L>  /  Alb  2.5<L>  /  TBili  1.3<H>  /  DBili  x   /  AST  19  /  ALT  14  /  AlkPhos  112  12-    Creatinine Trend: 0.69<--, 0.64<--, 0.57<--, 0.82<--, 0.79<--, 0.75<--  PT/INR - ( 07 Dec 2018 17:01 )   PT: 19.2 sec;   INR: 1.66 ratio         PTT - ( 07 Dec 2018 17:01 )  PTT:31.4 sec  Urinalysis Basic - ( 07 Dec 2018 15:29 )    Color: Light Yellow / Appearance: Clear / S.007 / pH: x  Gluc: x / Ketone: Negative  / Bili: Negative / Urobili: Negative   Blood: x / Protein: Negative / Nitrite: Negative   Leuk Esterase: Negative / RBC: 2 /hpf / WBC 1 /hpf   Sq Epi: x / Non Sq Epi: 1 /hpf / Bacteria: Negative            MICROBIOLOGY:       RADIOLOGY:  [ ] Reviewed and interpreted by me    PULMONARY FUNCTION TESTS:    EKG: CHIEF COMPLAINT: phlegm sob    HPI:  89 F with htn, grade III diastolic HF, recent SBO s/p decompression and exploratory laparotomy requiring ezequiel NICOLAS on eliquis, recently hospitalized for ADHF now here with sob and productive cough.    Patient's son at bedside to translate in [place] of [place].  States that since her SBO in November, she has always had a cough productive of whitish-yellowish sputum.  Throughout her hospitalizations this has waxed and waned.  However, earlier this week, the cough got worse, so he took her to [facility].  She was reportedly treated for HF there and no abx, and left AMA.  Her PCP did lab work and suggested she come in to the hospital.  She does not have fevers, chills, sob, or wheezing.  No cp.  No weight loss.  No sick contacts.      PAST MEDICAL & SURGICAL HISTORY:  Small bowel obstruction  CHF (congestive heart failure)  Atrial fibrillation  Hypertension  Hyperlipidemia  Arthritis  HTN (hypertension)  H/O abdominal hysterectomy  History of cholecystectomy      FAMILY HISTORY:  No pertinent family history in first degree relatives      SOCIAL HISTORY:  Smoking: [x ] Never Smoked [ ] Former Smoker (__ packs x ___ years) [ ] Current Smoker  (__ packs x ___ years)  Substance Use: [ ] Never Used [ ] Used ____  EtOH Use:  Marital Status: [ ] Single [ ]  [ ]  [ ]   Sexual History:   Occupation:  Recent Travel:  Country of Birth:  - [place]  Advance Directives:    Allergies    No Known Allergies    Intolerances        HOME MEDICATIONS:  Home Medications:  Centrum oral tablet: 1 tab(s) orally once a day (07 Dec 2018 17:52)  ocular lubricant ophthalmic solution: 1 drop(s) to each affected eye once a day, As Needed (07 Dec 2018 17:52)  potassium chloride 10 mEq oral capsule, extended release: 1 tab(s) orally once a day (07 Dec 2018 17:52)  Saline Mist 0.65% nasal spray: 1 spray(s) nasal once a day, As Needed (07 Dec 2018 17:52)  Tylenol 8 Hour 650 mg oral tablet, extended release: 1 tab(s) orally , As Needed (07 Dec 2018 17:52)      REVIEW OF SYSTEMS:  Constitutional: [x ] negative [ ] fevers [ ] chills [ ] weight loss [ ] weight gain  HEENT: [x ] negative [ ] dry eyes [ ] eye irritation [ ] postnasal drip [ ] nasal congestion  CV: [ ] negative  [ ] chest pain [x ] orthopnea [ ] palpitations [ ] murmur  Resp: [ ] negative [ x] cough [ ] shortness of breath [ ] dyspnea [ ] wheezing [x ] sputum [ ] hemoptysis  GI: [ x] negative [ ] nausea [ ] vomiting [ ] diarrhea [ ] constipation [ ] abd pain [ ] dysphagia   : [x ] negative [ ] dysuria [ ] nocturia [ ] hematuria [ ] increased urinary frequency  Musculoskeletal: [ x] negative [ ] back pain [ ] myalgias [ ] arthralgias [ ] fracture  Skin: [x ] negative [ ] rash [ ] itch  Neurological: [x ] negative [ ] headache [ ] dizziness [ ] syncope [ ] weakness [ ] numbness  Psychiatric: [x ] negative [ ] anxiety [ ] depression  Endocrine: [x ] negative [ ] diabetes [ ] thyroid problem  Hematologic/Lymphatic: [x ] negative [ ] anemia [ ] bleeding problem  Allergic/Immunologic: [ x] negative [ ] itchy eyes [ ] nasal discharge [ ] hives [ ] angioedema  [ ] All other systems negative  [ ] Unable to assess ROS because ________    OBJECTIVE:  ICU Vital Signs Last 24 Hrs  T(C): 26.5 (08 Dec 2018 08:33), Max: 36.8 (07 Dec 2018 17:06)  T(F): 79.7 (08 Dec 2018 08:33), Max: 98.3 (07 Dec 2018 17:06)  HR: 82 (08 Dec 2018 08:33) (69 - 82)  BP: 146/79 (08 Dec 2018 08:33) (122/75 - 146/79)  BP(mean): --  ABP: --  ABP(mean): --  RR: 18 (08 Dec 2018 08:33) (18 - 22)  SpO2: 98% (08 Dec 2018 08:33) (96% - 98%)        CAPILLARY BLOOD GLUCOSE      POCT Blood Glucose.: 111 mg/dL (08 Dec 2018 11:51)      PHYSICAL EXAM:  GENERAL: NAD  HEENT:  Atraumatic, Normocephalic  EYES: EOMI, PERRLA, conjunctiva and sclera clear  NECK: Supple, No JVD  CHEST/LUNG: decreased breath sounds right base  HEART: Regular rate and rhythm; No murmurs, rubs, or gallops  ABDOMEN: Soft, Nontender, Nondistended; Bowel sounds present  EXTREMITIES:  1+ pitting edema b/l LE  PSYCH: AAOx3  NEUROLOGY: non-focal exam  SKIN: No rashes or lesions    LINES:     HOSPITAL MEDICATIONS:  Standing Meds:  ALBUTerol/ipratropium for Nebulization 3 milliLiter(s) Nebulizer every 6 hours  apixaban 2.5 milliGRAM(s) Oral every 12 hours  artificial  tears Solution 1 Drop(s) Both EYES daily  docusate sodium 100 milliGRAM(s) Oral three times a day  furosemide   Injectable 20 milliGRAM(s) IV Push two times a day  lisinopril 40 milliGRAM(s) Oral daily  multivitamin/minerals 1 Tablet(s) Oral daily      PRN Meds:  acetaminophen   Tablet .. 650 milliGRAM(s) Oral every 6 hours PRN  guaiFENesin   Syrup  (Sugar-Free) 100 milliGRAM(s) Oral every 6 hours PRN  ondansetron Injectable 4 milliGRAM(s) IV Push every 6 hours PRN      LABS:                        12.9   7.7   )-----------( 357      ( 07 Dec 2018 15:26 )             38.8     Hgb Trend: 12.9<--  12-08    132<L>  |  93<L>  |  10  ----------------------------<  154<H>  3.2<L>   |  27  |  0.69    Ca    8.3<L>      08 Dec 2018 10:09  Mg     1.6     12-    TPro  5.7<L>  /  Alb  2.5<L>  /  TBili  1.3<H>  /  DBili  x   /  AST  19  /  ALT  14  /  AlkPhos  112  12-08    Creatinine Trend: 0.69<--, 0.64<--, 0.57<--, 0.82<--, 0.79<--, 0.75<--  PT/INR - ( 07 Dec 2018 17:01 )   PT: 19.2 sec;   INR: 1.66 ratio         PTT - ( 07 Dec 2018 17:01 )  PTT:31.4 sec  Urinalysis Basic - ( 07 Dec 2018 15:29 )    Color: Light Yellow / Appearance: Clear / S.007 / pH: x  Gluc: x / Ketone: Negative  / Bili: Negative / Urobili: Negative   Blood: x / Protein: Negative / Nitrite: Negative   Leuk Esterase: Negative / RBC: 2 /hpf / WBC 1 /hpf   Sq Epi: x / Non Sq Epi: 1 /hpf / Bacteria: Negative            MICROBIOLOGY:       RADIOLOGY:  [ x] Reviewed and interpreted by me  cxr 18: right pleural effusion (small), clear lungs otherwise  PULMONARY FUNCTION TESTS:    EKG:

## 2018-12-08 NOTE — PROGRESS NOTE ADULT - SUBJECTIVE AND OBJECTIVE BOX
SUBJECTIVE / OVERNIGHT EVENTS: c/o cough / shortness of breath on exertion       MEDICATIONS  (STANDING):  ALBUTerol/ipratropium for Nebulization 3 milliLiter(s) Nebulizer every 6 hours  apixaban 2.5 milliGRAM(s) Oral every 12 hours  artificial  tears Solution 1 Drop(s) Both EYES daily  docusate sodium 100 milliGRAM(s) Oral three times a day  furosemide   Injectable 20 milliGRAM(s) IV Push two times a day  lisinopril 40 milliGRAM(s) Oral daily  multivitamin/minerals 1 Tablet(s) Oral daily    MEDICATIONS  (PRN):  acetaminophen   Tablet .. 650 milliGRAM(s) Oral every 6 hours PRN Temp greater or equal to 38C (100.4F), Moderate Pain (4 - 6)  guaiFENesin   Syrup  (Sugar-Free) 100 milliGRAM(s) Oral every 6 hours PRN Cough  ondansetron Injectable 4 milliGRAM(s) IV Push every 6 hours PRN Nausea    Vital Signs Last 24 Hrs  T(C): 36.7 (08 Dec 2018 20:28), Max: 36.8 (08 Dec 2018 04:42)  T(F): 98.1 (08 Dec 2018 20:28), Max: 98.2 (08 Dec 2018 04:42)  HR: 77 (08 Dec 2018 20:28) (70 - 92)  BP: 115/64 (08 Dec 2018 20:28) (115/64 - 146/79)  BP(mean): 85 (08 Dec 2018 13:41) (85 - 85)  RR: 18 (08 Dec 2018 20:28) (18 - 18)  SpO2: 97% (08 Dec 2018 20:28) (96% - 98%)    CAPILLARY BLOOD GLUCOSE      POCT Blood Glucose.: 116 mg/dL (08 Dec 2018 21:07)  POCT Blood Glucose.: 108 mg/dL (08 Dec 2018 16:28)  POCT Blood Glucose.: 111 mg/dL (08 Dec 2018 11:51)  POCT Blood Glucose.: 93 mg/dL (08 Dec 2018 07:07)    I&O's Summary    08 Dec 2018 07:01  -  08 Dec 2018 23:41  --------------------------------------------------------  IN: 385 mL / OUT: 2 mL / NET: 383 mL        Constitutional: No fever, fatigue  Skin: No rash.  Eyes: No recent vision problems or eye pain.  ENT: No congestion, ear pain, or sore throat.  Cardiovascular: No chest pain or palpation.  Respiratory: cough / sob+  Gastrointestinal: No abdominal pain, nausea, vomiting, or diarrhea.  Genitourinary: No dysuria.  Musculoskeletal: No joint swelling.  Neurologic: No headache.    PHYSICAL EXAM:  GENERAL: NAD  EYES: EOMI, PERRLA  NECK: Supple, No JVD  CHEST/LUNG: dec breath sounds at bases   HEART:  S1 , S2 +  ABDOMEN: soft, bs+  EXTREMITIES:  trace edema  NEUROLOGY:alert awake oriented       LABS:                        11.4   6.86  )-----------( 330      ( 08 Dec 2018 11:54 )             33.0     12-08    132<L>  |  93<L>  |  10  ----------------------------<  154<H>  3.2<L>   |  27  |  0.69    Ca    8.3<L>      08 Dec 2018 10:09  Mg     1.6     12-08    TPro  5.7<L>  /  Alb  2.5<L>  /  TBili  1.3<H>  /  DBili  x   /  AST  19  /  ALT  14  /  AlkPhos  112  12-08    PT/INR - ( 07 Dec 2018 17:01 )   PT: 19.2 sec;   INR: 1.66 ratio         PTT - ( 07 Dec 2018 17:01 )  PTT:31.4 sec      Urinalysis Basic - ( 07 Dec 2018 15:29 )    Color: Light Yellow / Appearance: Clear / S.007 / pH: x  Gluc: x / Ketone: Negative  / Bili: Negative / Urobili: Negative   Blood: x / Protein: Negative / Nitrite: Negative   Leuk Esterase: Negative / RBC: 2 /hpf / WBC 1 /hpf   Sq Epi: x / Non Sq Epi: 1 /hpf / Bacteria: Negative        RADIOLOGY & ADDITIONAL TESTS:    Imaging Personally Reviewed:    Consultant(s) Notes Reviewed:      Care Discussed with Consultants/Other Providers:

## 2018-12-08 NOTE — PROVIDER CONTACT NOTE (OTHER) - BACKGROUND
Admit Diagnosis) Heart failure   CHF (congestive heart failure)  Hyperlipidemia Hypertension  Atrial fibrillation  (PMH) Arthritis

## 2018-12-09 LAB
ALBUMIN SERPL ELPH-MCNC: 2.8 G/DL — LOW (ref 3.3–5)
ALP SERPL-CCNC: 133 U/L — HIGH (ref 40–120)
ALT FLD-CCNC: 15 U/L — SIGNIFICANT CHANGE UP (ref 10–45)
ANION GAP SERPL CALC-SCNC: 11 MMOL/L — SIGNIFICANT CHANGE UP (ref 5–17)
AST SERPL-CCNC: 21 U/L — SIGNIFICANT CHANGE UP (ref 10–40)
BILIRUB SERPL-MCNC: 1.2 MG/DL — SIGNIFICANT CHANGE UP (ref 0.2–1.2)
BUN SERPL-MCNC: 11 MG/DL — SIGNIFICANT CHANGE UP (ref 7–23)
CALCIUM SERPL-MCNC: 8.7 MG/DL — SIGNIFICANT CHANGE UP (ref 8.4–10.5)
CHLORIDE SERPL-SCNC: 92 MMOL/L — LOW (ref 96–108)
CO2 SERPL-SCNC: 26 MMOL/L — SIGNIFICANT CHANGE UP (ref 22–31)
CREAT SERPL-MCNC: 0.7 MG/DL — SIGNIFICANT CHANGE UP (ref 0.5–1.3)
GLUCOSE BLDC GLUCOMTR-MCNC: 102 MG/DL — HIGH (ref 70–99)
GLUCOSE BLDC GLUCOMTR-MCNC: 109 MG/DL — HIGH (ref 70–99)
GLUCOSE BLDC GLUCOMTR-MCNC: 124 MG/DL — HIGH (ref 70–99)
GLUCOSE SERPL-MCNC: 112 MG/DL — HIGH (ref 70–99)
HCT VFR BLD CALC: 34.2 % — LOW (ref 34.5–45)
HGB BLD-MCNC: 11.8 G/DL — SIGNIFICANT CHANGE UP (ref 11.5–15.5)
MCHC RBC-ENTMCNC: 31.9 PG — SIGNIFICANT CHANGE UP (ref 27–34)
MCHC RBC-ENTMCNC: 34.5 GM/DL — SIGNIFICANT CHANGE UP (ref 32–36)
MCV RBC AUTO: 92.4 FL — SIGNIFICANT CHANGE UP (ref 80–100)
OSMOLALITY UR: 276 MOS/KG — SIGNIFICANT CHANGE UP (ref 50–1200)
PLATELET # BLD AUTO: 360 K/UL — SIGNIFICANT CHANGE UP (ref 150–400)
POTASSIUM SERPL-MCNC: 4.1 MMOL/L — SIGNIFICANT CHANGE UP (ref 3.5–5.3)
POTASSIUM SERPL-SCNC: 4.1 MMOL/L — SIGNIFICANT CHANGE UP (ref 3.5–5.3)
PROT SERPL-MCNC: 6.2 G/DL — SIGNIFICANT CHANGE UP (ref 6–8.3)
RBC # BLD: 3.7 M/UL — LOW (ref 3.8–5.2)
RBC # FLD: 15.6 % — HIGH (ref 10.3–14.5)
SODIUM SERPL-SCNC: 129 MMOL/L — LOW (ref 135–145)
WBC # BLD: 10.05 K/UL — SIGNIFICANT CHANGE UP (ref 3.8–10.5)
WBC # FLD AUTO: 10.05 K/UL — SIGNIFICANT CHANGE UP (ref 3.8–10.5)

## 2018-12-09 RX ADMIN — Medication 100 MILLIGRAM(S): at 23:13

## 2018-12-09 RX ADMIN — Medication 1 DROP(S): at 13:28

## 2018-12-09 RX ADMIN — Medication 100 MILLIGRAM(S): at 05:55

## 2018-12-09 RX ADMIN — APIXABAN 2.5 MILLIGRAM(S): 2.5 TABLET, FILM COATED ORAL at 05:55

## 2018-12-09 RX ADMIN — Medication 3 MILLILITER(S): at 18:04

## 2018-12-09 RX ADMIN — Medication 20 MILLIGRAM(S): at 18:04

## 2018-12-09 RX ADMIN — APIXABAN 2.5 MILLIGRAM(S): 2.5 TABLET, FILM COATED ORAL at 18:04

## 2018-12-09 RX ADMIN — Medication 100 MILLIGRAM(S): at 00:08

## 2018-12-09 RX ADMIN — Medication 1 TABLET(S): at 13:27

## 2018-12-09 RX ADMIN — LISINOPRIL 40 MILLIGRAM(S): 2.5 TABLET ORAL at 05:54

## 2018-12-09 RX ADMIN — Medication 3 MILLILITER(S): at 23:12

## 2018-12-09 RX ADMIN — Medication 20 MILLIGRAM(S): at 05:55

## 2018-12-09 RX ADMIN — Medication 3 MILLILITER(S): at 05:54

## 2018-12-09 RX ADMIN — Medication 650 MILLIGRAM(S): at 23:58

## 2018-12-09 RX ADMIN — Medication 650 MILLIGRAM(S): at 00:32

## 2018-12-09 RX ADMIN — Medication 650 MILLIGRAM(S): at 01:10

## 2018-12-09 RX ADMIN — Medication 100 MILLIGRAM(S): at 18:04

## 2018-12-09 RX ADMIN — Medication 3 MILLILITER(S): at 13:33

## 2018-12-09 NOTE — PROGRESS NOTE ADULT - SUBJECTIVE AND OBJECTIVE BOX
SUBJECTIVE / OVERNIGHT EVENTS: c/o cough / shortness of breath on exertion     MEDICATIONS  (STANDING):  ALBUTerol/ipratropium for Nebulization 3 milliLiter(s) Nebulizer every 6 hours  apixaban 2.5 milliGRAM(s) Oral every 12 hours  artificial  tears Solution 1 Drop(s) Both EYES daily  docusate sodium 100 milliGRAM(s) Oral three times a day  furosemide   Injectable 20 milliGRAM(s) IV Push two times a day  levoFLOXacin  Tablet 750 milliGRAM(s) Oral every 48 hours  lisinopril 40 milliGRAM(s) Oral daily  multivitamin/minerals 1 Tablet(s) Oral daily    MEDICATIONS  (PRN):  acetaminophen   Tablet .. 650 milliGRAM(s) Oral every 6 hours PRN Temp greater or equal to 38C (100.4F), Moderate Pain (4 - 6)  guaiFENesin   Syrup  (Sugar-Free) 100 milliGRAM(s) Oral every 6 hours PRN Cough  ondansetron Injectable 4 milliGRAM(s) IV Push every 6 hours PRN Nausea    Vital Signs Last 24 Hrs  T(C): 36.6 (09 Dec 2018 20:33), Max: 36.8 (09 Dec 2018 17:54)  T(F): 97.9 (09 Dec 2018 20:33), Max: 98.3 (09 Dec 2018 17:54)  HR: 76 (09 Dec 2018 20:33) (74 - 82)  BP: 93/58 (09 Dec 2018 20:33) (93/58 - 122/66)  BP(mean): --  RR: 18 (09 Dec 2018 20:33) (18 - 18)  SpO2: 98% (09 Dec 2018 20:33) (97% - 99%)    Constitutional: No fever, fatigue  Skin: No rash.  Eyes: No recent vision problems or eye pain.  ENT: No congestion, ear pain, or sore throat.  Cardiovascular: No chest pain or palpation.  Respiratory: cough / sob+  Gastrointestinal: No abdominal pain, nausea, vomiting, or diarrhea.  Genitourinary: No dysuria.  Musculoskeletal: No joint swelling.  Neurologic: No headache.    PHYSICAL EXAM:  GENERAL: NAD  EYES: EOMI, PERRLA  NECK: Supple, No JVD  CHEST/LUNG: dec breath sounds at bases   HEART:  S1 , S2 +  ABDOMEN: soft, bs+  EXTREMITIES:  trace edema  NEUROLOGY:alert awake oriented     LABS:  12-09    129<L>  |  92<L>  |  11  ----------------------------<  112<H>  4.1   |  26  |  0.70    Ca    8.7      09 Dec 2018 06:30  Mg     1.6         TPro  6.2  /  Alb  2.8<L>  /  TBili  1.2  /  DBili      /  AST  21  /  ALT  15  /  AlkPhos  133<H>      Creatinine Trend: 0.70 <--, 0.69 <--, 0.64 <--, 0.57 <--                        11.8   10.05 )-----------( 360      ( 09 Dec 2018 08:11 )             34.2     Urine Studies:  Urinalysis Basic - ( 07 Dec 2018 15:29 )    Color: Light Yellow / Appearance: Clear / S.007 / pH:   Gluc:  / Ketone: Negative  / Bili: Negative / Urobili: Negative   Blood:  / Protein: Negative / Nitrite: Negative   Leuk Esterase: Negative / RBC: 2 /hpf / WBC 1 /hpf   Sq Epi:  / Non Sq Epi: 1 /hpf / Bacteria: Negative      Osmolality, Random Urine: 276 mos/kg ( @ 21:37)  Sodium, Random Urine: 106 mmol/L ( @ 20:43)          LIVER FUNCTIONS - ( 09 Dec 2018 06:30 )  Alb: 2.8 g/dL / Pro: 6.2 g/dL / ALK PHOS: 133 U/L / ALT: 15 U/L / AST: 21 U/L / GGT: x               Color: Light Yellow / Appearance: Clear / S.007 / pH: x  Gluc: x / Ketone: Negative  / Bili: Negative / Urobili: Negative   Blood: x / Protein: Negative / Nitrite: Negative   Leuk Esterase: Negative / RBC: 2 /hpf / WBC 1 /hpf   Sq Epi: x / Non Sq Epi: 1 /hpf / Bacteria: Negative        RADIOLOGY & ADDITIONAL TESTS:    Imaging Personally Reviewed:    Consultant(s) Notes Reviewed:      Care Discussed with Consultants/Other Providers:

## 2018-12-09 NOTE — PROGRESS NOTE ADULT - SUBJECTIVE AND OBJECTIVE BOX
MARTI ALLEN:[id number],   89yFemale followed for:  No Known Allergies    PAST MEDICAL & SURGICAL HISTORY:  Small bowel obstruction  CHF (congestive heart failure)  Atrial fibrillation  Hypertension  Hyperlipidemia  Arthritis  HTN (hypertension)  H/O abdominal hysterectomy  History of cholecystectomy    FAMILY HISTORY:  No pertinent family history in first degree relatives    MEDICATIONS  (STANDING):  ALBUTerol/ipratropium for Nebulization 3 milliLiter(s) Nebulizer every 6 hours  apixaban 2.5 milliGRAM(s) Oral every 12 hours  artificial  tears Solution 1 Drop(s) Both EYES daily  docusate sodium 100 milliGRAM(s) Oral three times a day  furosemide   Injectable 20 milliGRAM(s) IV Push two times a day  lisinopril 40 milliGRAM(s) Oral daily  multivitamin/minerals 1 Tablet(s) Oral daily    MEDICATIONS  (PRN):  acetaminophen   Tablet .. 650 milliGRAM(s) Oral every 6 hours PRN Temp greater or equal to 38C (100.4F), Moderate Pain (4 - 6)  guaiFENesin   Syrup  (Sugar-Free) 100 milliGRAM(s) Oral every 6 hours PRN Cough  ondansetron Injectable 4 milliGRAM(s) IV Push every 6 hours PRN Nausea      Vital Signs Last 24 Hrs  T(C): 36.6 (09 Dec 2018 04:46), Max: 36.7 (08 Dec 2018 20:28)  T(F): 97.9 (09 Dec 2018 04:46), Max: 98.1 (08 Dec 2018 20:28)  HR: 74 (09 Dec 2018 04:46) (72 - 92)  BP: 122/66 (09 Dec 2018 04:46) (115/64 - 123/66)  BP(mean): 85 (08 Dec 2018 13:41) (85 - 85)  RR: 18 (09 Dec 2018 04:46) (18 - 18)  SpO2: 98% (09 Dec 2018 04:46) (96% - 98%)  nc/at  s1s2  cta  soft, nt, nd no guarding or rebound  no c/c/e    CBC Full  -  ( 09 Dec 2018 08:11 )  WBC Count : 10.05 K/uL  Hemoglobin : 11.8 g/dL  Hematocrit : 34.2 %  Platelet Count - Automated : 360 K/uL  Mean Cell Volume : 92.4 fl  Mean Cell Hemoglobin : 31.9 pg  Mean Cell Hemoglobin Concentration : 34.5 gm/dL  Auto Neutrophil # : x  Auto Lymphocyte # : x  Auto Monocyte # : x  Auto Eosinophil # : x  Auto Basophil # : x  Auto Neutrophil % : x  Auto Lymphocyte % : x  Auto Monocyte % : x  Auto Eosinophil % : x  Auto Basophil % : x    12-09    129<L>  |  92<L>  |  11  ----------------------------<  112<H>  4.1   |  26  |  0.70    Ca    8.7      09 Dec 2018 06:30  Mg     1.6     12-08    TPro  6.2  /  Alb  2.8<L>  /  TBili  1.2  /  DBili  x   /  AST  21  /  ALT  15  /  AlkPhos  133<H>  12-09    PT/INR - ( 07 Dec 2018 17:01 )   PT: 19.2 sec;   INR: 1.66 ratio         PTT - ( 07 Dec 2018 17:01 )  PTT:31.4 sec

## 2018-12-09 NOTE — PROGRESS NOTE ADULT - SUBJECTIVE AND OBJECTIVE BOX
Ladarius Brown MD  Interventional Cardiology  [place] Office : [phone] [place] [address] [address] [address]  Tel:   [place] Office : [address] [facility] [place] [address]  Tel: [phone]  Cell : [phone]    Subjective : Pt lying in bed comfortable, not in distress, denies any chest pain or SOB  	  MEDICATIONS:  apixaban 2.5 milliGRAM(s) Oral every 12 hours  furosemide   Injectable 20 milliGRAM(s) IV Push two times a day  lisinopril 40 milliGRAM(s) Oral daily    levoFLOXacin  Tablet 750 milliGRAM(s) Oral every 48 hours    ALBUTerol/ipratropium for Nebulization 3 milliLiter(s) Nebulizer every 6 hours  guaiFENesin   Syrup  (Sugar-Free) 100 milliGRAM(s) Oral every 6 hours PRN    acetaminophen   Tablet .. 650 milliGRAM(s) Oral every 6 hours PRN  ondansetron Injectable 4 milliGRAM(s) IV Push every 6 hours PRN    docusate sodium 100 milliGRAM(s) Oral three times a day      artificial  tears Solution 1 Drop(s) Both EYES daily  multivitamin/minerals 1 Tablet(s) Oral daily      PHYSICAL EXAM:  T(C): 36.6 (12-09-18 @ 20:33), Max: 36.8 (12-09-18 @ 17:54)  HR: 76 (12-09-18 @ 20:33) (74 - 82)  BP: 93/58 (12-09-18 @ 20:33) (93/58 - 122/66)  RR: 18 (12-09-18 @ 20:33) (18 - 18)  SpO2: 98% (12-09-18 @ 20:33) (97% - 99%)  Wt(kg): --  I&O's Summary    08 Dec 2018 07:01  -  09 Dec 2018 07:00  --------------------------------------------------------  IN: 385 mL / OUT: 2 mL / NET: 383 mL    09 Dec 2018 07:01  -  09 Dec 2018 23:57  --------------------------------------------------------  IN: 600 mL / OUT: 950 mL / NET: -350 mL          HEENT:   Normal oral mucosa, PERRL, EOMI	  Cardiovascular: Normal S1 S2, No JVD systolic murmur +, No edema  Respiratory: b/l basal creps 	  Gastrointestinal:  Soft, Non-tender, + BS	  Extremities: No clubbing, cyanosis or edema                                    11.8   10.05 )-----------( 360      ( 09 Dec 2018 08:11 )             34.2     12-09    129<L>  |  92<L>  |  11  ----------------------------<  112<H>  4.1   |  26  |  0.70    Ca    8.7      09 Dec 2018 06:30  Mg     1.6     12-08    TPro  6.2  /  Alb  2.8<L>  /  TBili  1.2  /  DBili  x   /  AST  21  /  ALT  15  /  AlkPhos  133<H>  12-09    proBNP:   Lipid Profile:   HgA1c:   TSH:

## 2018-12-09 NOTE — PROGRESS NOTE ADULT - SUBJECTIVE AND OBJECTIVE BOX
CHIEF COMPLAINT: sob    Interval Events:  No events overnight.  Still productive cough.  RVP negative.  No fevers/chills.    REVIEW OF SYSTEMS:  Constitutional: [x ] negative [ ] fevers [ ] chills [ ] weight loss [ ] weight gain  HEENT: [x ] negative [ ] dry eyes [ ] eye irritation [ ] postnasal drip [ ] nasal congestion  CV: [ ] negative  [ ] chest pain [x ] orthopnea [ ] palpitations [ ] murmur  Resp: [ ] negative [ x] cough [ ] shortness of breath [ ] dyspnea [ ] wheezing [x ] sputum [ ] hemoptysis  GI: [ x] negative [ ] nausea [ ] vomiting [ ] diarrhea [ ] constipation [ ] abd pain [ ] dysphagia   : [x ] negative [ ] dysuria [ ] nocturia [ ] hematuria [ ] increased urinary frequency  Musculoskeletal: [ x] negative [ ] back pain [ ] myalgias [ ] arthralgias [ ] fracture  Skin: [x ] negative [ ] rash [ ] itch  Neurological: [x ] negative [ ] headache [ ] dizziness [ ] syncope [ ] weakness [ ] numbness  Psychiatric: [x ] negative [ ] anxiety [ ] depression  Endocrine: [x ] negative [ ] diabetes [ ] thyroid problem  Hematologic/Lymphatic: [x ] negative [ ] anemia [ ] bleeding problem  Allergic/Immunologic: [ x] negative [ ] itchy eyes [ ] nasal discharge [ ] hives [ ] angioedema  [ ] All other systems negative  [ ] Unable to assess ROS because ________    OBJECTIVE:  ICU Vital Signs Last 24 Hrs  T(C): 36.6 (09 Dec 2018 04:46), Max: 36.7 (08 Dec 2018 20:28)  T(F): 97.9 (09 Dec 2018 04:46), Max: 98.1 (08 Dec 2018 20:28)  HR: 74 (09 Dec 2018 04:46) (72 - 92)  BP: 122/66 (09 Dec 2018 04:46) (115/64 - 123/66)  BP(mean): 85 (08 Dec 2018 13:41) (85 - 85)  ABP: --  ABP(mean): --  RR: 18 (09 Dec 2018 04:46) (18 - 18)  SpO2: 98% (09 Dec 2018 04:46) (96% - 98%)         @ 07: @ 07:00  --------------------------------------------------------  IN: 385 mL / OUT: 2 mL / NET: 383 mL     @ 07: @ 11:08  --------------------------------------------------------  IN: 360 mL / OUT: 200 mL / NET: 160 mL      CAPILLARY BLOOD GLUCOSE      POCT Blood Glucose.: 109 mg/dL (09 Dec 2018 07:35)      PHYSICAL EXAM:  GENERAL: NAD  HEENT:  Atraumatic, Normocephalic  EYES: EOMI, PERRLA, conjunctiva and sclera clear  NECK: Supple, No JVD  CHEST/LUNG: decreased breath sounds right base  HEART: Regular rate and rhythm; No murmurs, rubs, or gallops  ABDOMEN: Soft, Nontender, Nondistended; Bowel sounds present  EXTREMITIES:  1+ pitting edema b/l LE  PSYCH: AAOx3  NEUROLOGY: non-focal exam  SKIN: No rashes or lesions    HOSPITAL MEDICATIONS:  MEDICATIONS  (STANDING):  ALBUTerol/ipratropium for Nebulization 3 milliLiter(s) Nebulizer every 6 hours  apixaban 2.5 milliGRAM(s) Oral every 12 hours  artificial  tears Solution 1 Drop(s) Both EYES daily  docusate sodium 100 milliGRAM(s) Oral three times a day  furosemide   Injectable 20 milliGRAM(s) IV Push two times a day  lisinopril 40 milliGRAM(s) Oral daily  multivitamin/minerals 1 Tablet(s) Oral daily    MEDICATIONS  (PRN):  acetaminophen   Tablet .. 650 milliGRAM(s) Oral every 6 hours PRN Temp greater or equal to 38C (100.4F), Moderate Pain (4 - 6)  guaiFENesin   Syrup  (Sugar-Free) 100 milliGRAM(s) Oral every 6 hours PRN Cough  ondansetron Injectable 4 milliGRAM(s) IV Push every 6 hours PRN Nausea      LABS:                        11.8   10.05 )-----------( 360      ( 09 Dec 2018 08:11 )             34.2     Hgb Trend: 11.8<--, 11.4<--, 12.9<--  12    129<L>  |  92<L>  |  11  ----------------------------<  112<H>  4.1   |  26  |  0.70    Ca    8.7      09 Dec 2018 06:30  Mg     1.6         TPro  6.2  /  Alb  2.8<L>  /  TBili  1.2  /  DBili  x   /  AST  21  /  ALT  15  /  AlkPhos  133<H>      Creatinine Trend: 0.70<--, 0.69<--, 0.64<--, 0.57<--, 0.82<--, 0.79<--  PT/INR - ( 07 Dec 2018 17:01 )   PT: 19.2 sec;   INR: 1.66 ratio         PTT - ( 07 Dec 2018 17:01 )  PTT:31.4 sec  Urinalysis Basic - ( 07 Dec 2018 15:29 )    Color: Light Yellow / Appearance: Clear / S.007 / pH: x  Gluc: x / Ketone: Negative  / Bili: Negative / Urobili: Negative   Blood: x / Protein: Negative / Nitrite: Negative   Leuk Esterase: Negative / RBC: 2 /hpf / WBC 1 /hpf   Sq Epi: x / Non Sq Epi: 1 /hpf / Bacteria: Negative            MICROBIOLOGY:     Culture - Urine (collected 08 Dec 2018 00:11)  Source: .Urine Clean Catch (Midstream)  Final Report (08 Dec 2018 21:00):    10,000 - 49,000 CFU/mL Streptococcus agalactiae (Group B) isolated    Group B streptococci are susceptible to ampicillin,    penicillin and cefazolin, but may be resistant to    erythromycin and clindamycin.    Recommendations for intrapartum prophylaxis for Group B    streptococci are penicillin or ampicillin.        RADIOLOGY:  [ ] Reviewed and interpreted by me    PULMONARY FUNCTION TESTS:    EKG:

## 2018-12-10 ENCOUNTER — TRANSCRIPTION ENCOUNTER (OUTPATIENT)
Age: 83
End: 2018-12-10

## 2018-12-10 LAB
ANION GAP SERPL CALC-SCNC: 13 MMOL/L — SIGNIFICANT CHANGE UP (ref 5–17)
ANION GAP SERPL CALC-SCNC: 14 MMOL/L — SIGNIFICANT CHANGE UP (ref 5–17)
BUN SERPL-MCNC: 10 MG/DL — SIGNIFICANT CHANGE UP (ref 7–23)
BUN SERPL-MCNC: 11 MG/DL — SIGNIFICANT CHANGE UP (ref 7–23)
CALCIUM SERPL-MCNC: 8.4 MG/DL — SIGNIFICANT CHANGE UP (ref 8.4–10.5)
CALCIUM SERPL-MCNC: 8.6 MG/DL — SIGNIFICANT CHANGE UP (ref 8.4–10.5)
CHLORIDE SERPL-SCNC: 90 MMOL/L — LOW (ref 96–108)
CHLORIDE SERPL-SCNC: 92 MMOL/L — LOW (ref 96–108)
CO2 SERPL-SCNC: 24 MMOL/L — SIGNIFICANT CHANGE UP (ref 22–31)
CO2 SERPL-SCNC: 26 MMOL/L — SIGNIFICANT CHANGE UP (ref 22–31)
CREAT SERPL-MCNC: 0.77 MG/DL — SIGNIFICANT CHANGE UP (ref 0.5–1.3)
CREAT SERPL-MCNC: 0.81 MG/DL — SIGNIFICANT CHANGE UP (ref 0.5–1.3)
GLUCOSE SERPL-MCNC: 141 MG/DL — HIGH (ref 70–99)
GLUCOSE SERPL-MCNC: 87 MG/DL — SIGNIFICANT CHANGE UP (ref 70–99)
HCT VFR BLD CALC: 33.9 % — LOW (ref 34.5–45)
HGB BLD-MCNC: 11.4 G/DL — LOW (ref 11.5–15.5)
MAGNESIUM SERPL-MCNC: 1.9 MG/DL — SIGNIFICANT CHANGE UP (ref 1.6–2.6)
MCHC RBC-ENTMCNC: 31 PG — SIGNIFICANT CHANGE UP (ref 27–34)
MCHC RBC-ENTMCNC: 33.6 GM/DL — SIGNIFICANT CHANGE UP (ref 32–36)
MCV RBC AUTO: 92.1 FL — SIGNIFICANT CHANGE UP (ref 80–100)
PHOSPHATE SERPL-MCNC: 3.1 MG/DL — SIGNIFICANT CHANGE UP (ref 2.5–4.5)
PLATELET # BLD AUTO: 376 K/UL — SIGNIFICANT CHANGE UP (ref 150–400)
POTASSIUM SERPL-MCNC: 3.9 MMOL/L — SIGNIFICANT CHANGE UP (ref 3.5–5.3)
POTASSIUM SERPL-MCNC: 4 MMOL/L — SIGNIFICANT CHANGE UP (ref 3.5–5.3)
POTASSIUM SERPL-SCNC: 3.9 MMOL/L — SIGNIFICANT CHANGE UP (ref 3.5–5.3)
POTASSIUM SERPL-SCNC: 4 MMOL/L — SIGNIFICANT CHANGE UP (ref 3.5–5.3)
RBC # BLD: 3.68 M/UL — LOW (ref 3.8–5.2)
RBC # FLD: 15.5 % — HIGH (ref 10.3–14.5)
SODIUM SERPL-SCNC: 128 MMOL/L — LOW (ref 135–145)
SODIUM SERPL-SCNC: 131 MMOL/L — LOW (ref 135–145)
WBC # BLD: 10.41 K/UL — SIGNIFICANT CHANGE UP (ref 3.8–10.5)
WBC # FLD AUTO: 10.41 K/UL — SIGNIFICANT CHANGE UP (ref 3.8–10.5)

## 2018-12-10 PROCEDURE — 99232 SBSQ HOSP IP/OBS MODERATE 35: CPT

## 2018-12-10 RX ORDER — MAGNESIUM SULFATE 500 MG/ML
1 VIAL (ML) INJECTION ONCE
Qty: 0 | Refills: 0 | Status: COMPLETED | OUTPATIENT
Start: 2018-12-10 | End: 2018-12-10

## 2018-12-10 RX ORDER — FUROSEMIDE 40 MG
40 TABLET ORAL DAILY
Qty: 0 | Refills: 0 | Status: DISCONTINUED | OUTPATIENT
Start: 2018-12-10 | End: 2018-12-11

## 2018-12-10 RX ORDER — LANOLIN ALCOHOL/MO/W.PET/CERES
3 CREAM (GRAM) TOPICAL ONCE
Qty: 0 | Refills: 0 | Status: COMPLETED | OUTPATIENT
Start: 2018-12-10 | End: 2018-12-10

## 2018-12-10 RX ADMIN — Medication 100 MILLIGRAM(S): at 05:24

## 2018-12-10 RX ADMIN — APIXABAN 2.5 MILLIGRAM(S): 2.5 TABLET, FILM COATED ORAL at 05:24

## 2018-12-10 RX ADMIN — Medication 3 MILLILITER(S): at 11:28

## 2018-12-10 RX ADMIN — Medication 650 MILLIGRAM(S): at 10:10

## 2018-12-10 RX ADMIN — Medication 100 MILLIGRAM(S): at 11:28

## 2018-12-10 RX ADMIN — Medication 650 MILLIGRAM(S): at 18:37

## 2018-12-10 RX ADMIN — Medication 3 MILLILITER(S): at 05:24

## 2018-12-10 RX ADMIN — Medication 650 MILLIGRAM(S): at 19:30

## 2018-12-10 RX ADMIN — LISINOPRIL 40 MILLIGRAM(S): 2.5 TABLET ORAL at 05:24

## 2018-12-10 RX ADMIN — Medication 3 MILLIGRAM(S): at 23:37

## 2018-12-10 RX ADMIN — Medication 650 MILLIGRAM(S): at 09:11

## 2018-12-10 RX ADMIN — Medication 650 MILLIGRAM(S): at 00:20

## 2018-12-10 RX ADMIN — Medication 1 DROP(S): at 11:28

## 2018-12-10 RX ADMIN — Medication 100 MILLIGRAM(S): at 21:31

## 2018-12-10 RX ADMIN — Medication 100 GRAM(S): at 21:31

## 2018-12-10 RX ADMIN — Medication 3 MILLILITER(S): at 23:37

## 2018-12-10 RX ADMIN — Medication 40 MILLIGRAM(S): at 05:24

## 2018-12-10 RX ADMIN — APIXABAN 2.5 MILLIGRAM(S): 2.5 TABLET, FILM COATED ORAL at 16:54

## 2018-12-10 RX ADMIN — Medication 1 TABLET(S): at 11:28

## 2018-12-10 RX ADMIN — Medication 3 MILLILITER(S): at 16:55

## 2018-12-10 RX ADMIN — Medication 100 MILLIGRAM(S): at 09:11

## 2018-12-10 NOTE — CONSULT NOTE ADULT - ASSESSMENT
89 F with htn, grade III diastolic HF, recent SBO s/p decompression and exploratory laparotomy requiring MARIA ISABEL, afib on Eliquis recently hospitalized for ADHF now here with sob and productive cough.    #productive cough, possibly due to viral pna  - would check RVP first, f/u cbc  - if RVP negative, would start antibiotics for possible pna (?aspiration); consider zosyn or levaquin  - nebulizers prn  - diuresis per primary team  - would get speech and swallow evaluation    d/w family and primary NP     --  Teresita Childers MD  Pulmonary & Critical Care Medicine Fellow | PGY-7  [phone]
A/P: 89y f w PMhx HTN, DM, recent volvulus around fallopian tube s/p decompression, SBO s/p 11/16 exploratory laparotomy w/ MARIA ISABEL, recent diagnosis of CHFpEF ( EF= 55 % in 11/2018), afib on elequis, p/w elevated BNP to 8700 from outside hospital where patient was seen due to Nausea and vomiting. Patient is admitted with acute heart failure with preserved EF.        Abdominal wound w/ skin dehiscence= AQUACEL packing  COnsider calling Dr Amaro/ general surgery-  BLE elevation  Abx per Medicine/ ID  Moisturize intact skin w/ SWEEN cream BID  con't Nutrition (as tolerated), Nutrition Consult  con't Offloading   con't Pericare  Care as per medicine will follow w/ you  Upon discharge f/u as outpatient at [facility] [address] [place] [address] [phone]  Seen w/ attng and D/w team  Thank you for this consult  Nayely Rosa PA-C [id number]

## 2018-12-10 NOTE — PROGRESS NOTE ADULT - SUBJECTIVE AND OBJECTIVE BOX
Ladarius Brown MD  Interventional Cardiology / Advance Heart Failure and Cardiac Transplant Specialist  [place] Office : [phone] [place] [address] [facility] [address]  Tel:   [place] Office : [address] [facility] [place] [address]  Tel: [phone]  Cell : [phone]    Subjective : Pt lying in bed comfortable, not in distress, denies any chest pain or SOB  	  MEDICATIONS:  apixaban 2.5 milliGRAM(s) Oral every 12 hours  furosemide    Tablet 40 milliGRAM(s) Oral daily  lisinopril 40 milliGRAM(s) Oral daily  levoFLOXacin  Tablet 750 milliGRAM(s) Oral every 48 hours  ALBUTerol/ipratropium for Nebulization 3 milliLiter(s) Nebulizer every 6 hours  guaiFENesin   Syrup  (Sugar-Free) 100 milliGRAM(s) Oral every 6 hours PRN  acetaminophen   Tablet .. 650 milliGRAM(s) Oral every 6 hours PRN  melatonin 3 milliGRAM(s) Oral once  ondansetron Injectable 4 milliGRAM(s) IV Push every 6 hours PRN  docusate sodium 100 milliGRAM(s) Oral three times a day  artificial  tears Solution 1 Drop(s) Both EYES daily  multivitamin/minerals 1 Tablet(s) Oral daily      PHYSICAL EXAM:  T(C): 36.7 (12-10-18 @ 20:28), Max: 37 (12-10-18 @ 19:10)  HR: 92 (12-10-18 @ 20:28) (76 - 92)  BP: 124/72 (12-10-18 @ 20:28) (116/57 - 139/61)  RR: 18 (12-10-18 @ 20:28) (18 - 18)  SpO2: 98% (12-10-18 @ 20:28) (96% - 98%)  Wt(kg): --  I&O's Summary    09 Dec 2018 07:01  -  10 Dec 2018 07:00  --------------------------------------------------------  IN: 600 mL / OUT: 950 mL / NET: -350 mL    10 Dec 2018 07:01  -  10 Dec 2018 22:29  --------------------------------------------------------  IN: 815 mL / OUT: 700 mL / NET: 115 mL          Appearance: Normal	  HEENT:   Normal oral mucosa, PERRL, EOMI	  Cardiovascular: Normal S1 S2, No JVD, No murmurs, No edema  Respiratory: Lungs clear to auscultation	  Gastrointestinal:  Soft, Non-tender, + BS	  Extremities: Normal range of motion, No clubbing, cyanosis or edema                                    11.4   10.41 )-----------( 376      ( 10 Dec 2018 07:52 )             33.9     12-10    128<L>  |  90<L>  |  11  ----------------------------<  141<H>  3.9   |  24  |  0.81    Ca    8.4      10 Dec 2018 20:22  Phos  3.1     12-10  Mg     1.9     12-10    TPro  6.2  /  Alb  2.8<L>  /  TBili  1.2  /  DBili  x   /  AST  21  /  ALT  15  /  AlkPhos  133<H>  12-09    proBNP:   Lipid Profile:   HgA1c:   TSH:

## 2018-12-10 NOTE — PROGRESS NOTE ADULT - SUBJECTIVE AND OBJECTIVE BOX
SUBJECTIVE / OVERNIGHT EVENTS: c/o cough / shortness of breath on exertion     MEDICATIONS  (STANDING):  ALBUTerol/ipratropium for Nebulization 3 milliLiter(s) Nebulizer every 6 hours  apixaban 2.5 milliGRAM(s) Oral every 12 hours  artificial  tears Solution 1 Drop(s) Both EYES daily  docusate sodium 100 milliGRAM(s) Oral three times a day  furosemide    Tablet 40 milliGRAM(s) Oral daily  levoFLOXacin  Tablet 750 milliGRAM(s) Oral every 48 hours  lisinopril 40 milliGRAM(s) Oral daily  multivitamin/minerals 1 Tablet(s) Oral daily    MEDICATIONS  (PRN):  acetaminophen   Tablet .. 650 milliGRAM(s) Oral every 6 hours PRN Temp greater or equal to 38C (100.4F), Moderate Pain (4 - 6)  guaiFENesin   Syrup  (Sugar-Free) 100 milliGRAM(s) Oral every 6 hours PRN Cough  ondansetron Injectable 4 milliGRAM(s) IV Push every 6 hours PRN Nausea    Vital Signs Last 24 Hrs  T(C): 36.7 (10 Dec 2018 20:28), Max: 37 (10 Dec 2018 19:10)  T(F): 98 (10 Dec 2018 20:28), Max: 98.6 (10 Dec 2018 19:10)  HR: 92 (10 Dec 2018 20:28) (76 - 92)  BP: 124/72 (10 Dec 2018 20:28) (116/57 - 139/61)  BP(mean): --  RR: 18 (10 Dec 2018 20:28) (18 - 18)  SpO2: 98% (10 Dec 2018 20:28) (96% - 98%)    Constitutional: No fever, fatigue  Skin: No rash.  Eyes: No recent vision problems or eye pain.  ENT: No congestion, ear pain, or sore throat.  Cardiovascular: No chest pain or palpation.  Respiratory: cough / sob+  Gastrointestinal: No abdominal pain, nausea, vomiting, or diarrhea.  Genitourinary: No dysuria.  Musculoskeletal: No joint swelling.  Neurologic: No headache.    PHYSICAL EXAM:  GENERAL: NAD  EYES: EOMI, PERRLA  NECK: Supple, No JVD  CHEST/LUNG: dec breath sounds at bases   HEART:  S1 , S2 +  ABDOMEN: soft, bs+  EXTREMITIES:  trace edema  NEUROLOGY:alert awake oriented     LABS:  12-10    128<L>  |  90<L>  |  11  ----------------------------<  141<H>  3.9   |  24  |  0.81    Ca    8.4      10 Dec 2018 20:22  Phos  3.1     12-10  Mg     1.9     12-10    TPro  6.2  /  Alb  2.8<L>  /  TBili  1.2  /  DBili      /  AST  21  /  ALT  15  /  AlkPhos  133<H>      Creatinine Trend: 0.81 <--, 0.77 <--, 0.70 <--, 0.69 <--, 0.64 <--, 0.57 <--                        11.4   10.41 )-----------( 376      ( 10 Dec 2018 07:52 )             33.9     Urine Studies:  Urinalysis Basic - ( 07 Dec 2018 15:29 )    Color: Light Yellow / Appearance: Clear / S.007 / pH:   Gluc:  / Ketone: Negative  / Bili: Negative / Urobili: Negative   Blood:  / Protein: Negative / Nitrite: Negative   Leuk Esterase: Negative / RBC: 2 /hpf / WBC 1 /hpf   Sq Epi:  / Non Sq Epi: 1 /hpf / Bacteria: Negative      Osmolality, Random Urine: 276 mos/kg ( @ 21:37)  Sodium, Random Urine: 106 mmol/L ( @ 20:43)          LIVER FUNCTIONS - ( 09 Dec 2018 06:30 )  Alb: 2.8 g/dL / Pro: 6.2 g/dL / ALK PHOS: 133 U/L / ALT: 15 U/L / AST: 21 U/L / GGT: x

## 2018-12-10 NOTE — DISCHARGE NOTE ADULT - PLAN OF CARE
to remain without worsened or reoccurring symptoms of heart failure including shortness of breath, weight gain and or swelling Weigh yourself daily.  If you gain 3lbs in 3 days, or 5lbs in a week call your Health Care Provider.  Do not eat or drink foods containing more than 2000mg of salt (sodium) in your diet every day.  Call your Health Care Provider if you have any swelling or increased swelling in your feet, ankles, and/or stomach.  Take all of your medication as directed.  If you become dizzy call your Health Care Provider. Pneumonia is a lung infection that can cause a fever, cough, and trouble breathing.  Continue all antibiotics as ordered until complete.  Nutrition is important, eat small frequent meals.  Get lots of rest and drink fluids.  Call your health care provider upon arrival home from hospital and make a follow up appointment for one week.  If your cough worsens, you develop fever greater than 101', you have shaking chills, a fast heartbeat, trouble breathing and/or feel your are breathing much faster than usual, call your healthcare provider.  Make sure you wash your hands frequently. Atrial fibrillation is the most common heart rhythm problem.  The condition puts you at risk for has stroke and heart attack  It helps if you control your blood pressure, not drink more than 1-2 alcohol drinks per day, cut down on caffeine, getting treatment for over active thyroid gland, and get regular exercise  Call your doctor if you feel your heart racing or beating unusually, chest tightness or pain, lightheaded, faint, shortness of breath especially with exercise  It is important to take your heart medication as prescribed  You may be on anticoagulation which is very important to take as directed - you may need blood work to monitor drug levels Continue current medications, follow up with PMD for management Follow up with your medical doctor to establish long term blood pressure treatment goals. Recent surgery with abdominal wound dehiscence Follow up with Surgery as directed, Dr. Xavier  Follow up at [facility] [address] [facility] [phone]

## 2018-12-10 NOTE — DISCHARGE NOTE ADULT - CARE PROVIDERS DIRECT ADDRESSES
[email or web address]

## 2018-12-10 NOTE — DISCHARGE NOTE ADULT - ADDITIONAL INSTRUCTIONS
1. Please call to schedule an appointment with Dr. Xavier within 1 weeks  2. please call to schedule an appointment at the [facility] [phone]

## 2018-12-10 NOTE — DISCHARGE NOTE ADULT - CARE PROVIDER_API CALL
Dionisio Nunez (MD), Surgery  [facility]  [address] [place] [address]  [address]  [place] [address]  Phone: [phone]  Fax: [phone]    Ventura Leonard), ColonRectal Surgery; Surgery  [address] [place] [address]  Phone: [phone]  Fax: [phone]    Ramon Perez; MBBS), Internal Medicine  [address] [place] [address]  Phone: [phone]  Fax: [phone]

## 2018-12-10 NOTE — CONSULT NOTE ADULT - ATTENDING COMMENTS
89 yo Taiwanese speaking female , admitted 72 hrs ago, noted to have an abdominal wound  In November [facility]ABEL [facility],  with this the third post op hospitalization [facility]  Son reports no bowel resection  Under care of Cynthia KHAN  Son, Andres, present  The patient is awake and alert , NAD, who is capable of walking limited distance  Tolerating a clear liquid diet  Patient is frail, petite  Abdomen is soft with a healed midline wound from a remote hysterectomy  Superimposed on the superior aspect of this wound, is a small, stapled, also midline wound, with staples in place, in superior aspect of wound  The inferior portion of wound , approx 3 cm, open , with staples having apparently been previously removed ,which communicates  with a  3cm length of  undermined stapled potion of wound  Fascia is intact , and no apparent incisional hernia with coughing  'Scant serous discharge is noted  Aquacel packing placed  Son reports having used Plain packing prior  Status d/w Andres, who will contact operating surgeon, Dr LUAN Amaro,  about mom's current admission 89 yo Moroccan speaking female , admitted 72 hrs ago, noted to have an abdominal wound  In November had x MARIA ISABEL Becker, at [facility],  with this the third post op hospitalization following [facility] discharge  Son reports no bowel resection  Under care of Cynthia KHAN  Son, nAdres, present  The patient is awake and alert , NAD, who is capable of walking limited distance  Tolerating a clear liquid diet  Patient is frail, petite  Abdomen is soft with a healed midline wound from a remote hysterectomy  Superimposed on the superior aspect of this wound, is a small, stapled, also midline wound, with staples in place, in superior aspect of wound  The inferior portion of wound , approx 3 cm, open , with staples having apparently been previously removed ,which communicates  with a  3cm length of  undermined stapled potion of wound  Fascia is intact , and no apparent incisional hernia with coughing  'Scant serous discharge is noted  Aquacel packing placed  Son reports having used Plain packing prior  Status d/w Andres, who will contact operating surgeon, Dr LUAN Leonard,  about mom's current admission

## 2018-12-10 NOTE — CONSULT NOTE ADULT - SUBJECTIVE AND OBJECTIVE BOX
Wound SURGERY CONSULT NOTE    HPI:  89y f w PMhx HTN, Recent volvulus around fallopian tube s/p decompression, SBO s/p 11/16 exploratory laparotomy w/ MARIA ISABEL, recent diagnosis of CHFpEF ( EF= 55 % in 11/2018), Afib on elequis, p/w elevated BNP to 8700 which was done from [facility]. Patient was recently D/C from [facility] due to acute heart failure on 12/1/18.  On 12/4/18, patient was admitted to [facility] for Nausea and Vomiting and  patient's son signed her out AMA from [facility].  Sone states that patient continues to have cough with whitish phlegm and increased legs swelling and SOB at night and one day post signing out from hospital, he brought the patient to see her PMD .  The PMD saw most recent lab results, she instructed family to bring pt back to hospital for re-admission. Family was unhappy w/ [facility] and so opted to bring pt here. Pt is no longer vomiting. She has pitting edema +4 in lower extremities, significant cough , no fever , no diarrhea , no abd pain.  Of note, patient was recently in the hospital for (11/27/18-12/1/18 ) for CHF.       Labs From [facility] : BNP was 8744  Na was 135  cr 0.77  wbc = 9.8  Lipase = 82  CT of the abd with IV contrast with Cardiomegaly with Rt pleural Effusion , Ascites, diffuse edema within the subcutaneous soft tissues consistent with Anasarca   Colonic Diverticulosis / no mention about small bowel.   ABd Xrays done and revealed Dilatation of the small bowel suggestive of an ileus or incomplete obstruction.      Wound consult requested to assist w/ management of abdominal  wound/ pressure ulcer.  At home son reports using packing from brown bottle.  Pt has had f/u w/ Dr Amaro who did her surgery in [facility].   DSD placed awaiting consult. No odor, redness, warmth, pain, f/c/s noted. Drainage not managed by dressing.  Pt's son at bedside.  All questions asked and answered to pt & family's satisfaction.       PAST MEDICAL & SURGICAL HISTORY:  Small bowel obstruction s/p Ex Lap w/ MARIA ISABEL  CHF (congestive heart failure)  Atrial fibrillation  Hypertension  Hyperlipidemia  Arthritis  s/p abdominal hysterectomy  s/p cholecystectomy      REVIEW OF SYSTEMS  Skin/ GI: see HPI  All other systems negative    MEDICATIONS  (STANDING):  ALBUTerol/ipratropium for Nebulization 3 milliLiter(s) Nebulizer every 6 hours  apixaban 2.5 milliGRAM(s) Oral every 12 hours  artificial  tears Solution 1 Drop(s) Both EYES daily  docusate sodium 100 milliGRAM(s) Oral three times a day  furosemide    Tablet 40 milliGRAM(s) Oral daily  levoFLOXacin  Tablet 750 milliGRAM(s) Oral every 48 hours  lisinopril 40 milliGRAM(s) Oral daily  multivitamin/minerals 1 Tablet(s) Oral daily    MEDICATIONS  (PRN):  acetaminophen   Tablet .. 650 milliGRAM(s) Oral every 6 hours PRN Temp greater or equal to 38C (100.4F), Moderate Pain (4 - 6)  guaiFENesin   Syrup  (Sugar-Free) 100 milliGRAM(s) Oral every 6 hours PRN Cough  ondansetron Injectable 4 milliGRAM(s) IV Push every 6 hours PRN Nausea    No Known Allergies    SOCIAL HISTORY:  ; lives w/ family; Denies smoking, ETOH, drugs    FAMILY HISTORY:  No pertinent family history in first degree relatives      Vital Signs Last 24 Hrs  T(C): 36.6 (10 Dec 2018 12:07), Max: 36.8 (09 Dec 2018 17:54)  T(F): 97.9 (10 Dec 2018 12:07), Max: 98.3 (09 Dec 2018 17:54)  HR: 78 (10 Dec 2018 12:07) (76 - 82)  BP: 127/74 (10 Dec 2018 12:07) (93/58 - 127/74)  BP(mean): --  RR: 18 (10 Dec 2018 12:07) (18 - 18)  SpO2: 96% (10 Dec 2018 12:07) (96% - 98%)    NAD /  A&Ox3/ frail  WD/ WN/ WG/   Versa Care P500 bed    Cardiovascular: RRR (+)m    Respiratory: rhonchi    Gastrointestinal soft NT/ND (+)BS    Midline abdominal incision w/ skin dehiscence - intact fascia  distal aspect w/ 3cm opening and 3 cm tunnel superiorly under intact incision w/staples  wound bed moist & granular  (+) serosanguinous drainage  No odor, erythema, increased warmth, tenderness, induration, fluctuance    Neurology  weakened strength & sensation grossly intact    Musculoskeletal/Vascular: fROM x4  mild BLE edema equal  BLE equally warm  no acute ischemia noted    Skin:  moist w/ good turgor      LABS:  12-10    131<L>  |  92<L>  |  10  ----------------------------<  87  4.0   |  26  |  0.77    Ca    8.6      10 Dec 2018 06:14    TPro  6.2  /  Alb  2.8<L>  /  TBili  1.2  /  DBili  x   /  AST  21  /  ALT  15  /  AlkPhos  133<H>  12-09                          11.4   10.41 )-----------( 376      ( 10 Dec 2018 07:52 )             33.9           RADIOLOGY & ADDITIONAL STUDIES:    < from: Xray Abdomen 2 Views (12.07.18 @ 19:32) >  FINDINGS:  Nonobstructive bowel gas pattern.  There is no evidence of intraperitoneal free air.  There is no evidence of organomegaly or pathologic calcification.  Degenerative disease of the visualized thoracolumbar spine    IMPRESSION:   Nonobstructive bowel gas pattern without evidence of free air.    < from: Xray Chest 1 View AP/PA (12.07.18 @ 14:50) >  Impression:    The heart is enlarged. Right pleural effusion. The left lung appears to   be clear. The bones are markedly demineralized.    < from: US Duplex Venous Lower Ext Complete, Bilateral (11.30.18 @ 14:55) >  FINDINGS:    There is normal compressibility of the bilateral common femoral, femoral   and popliteal veins. The left posterior tibial vein was unremarkable..   The remainder of the deep veins were not visualized.     Doppler examination shows normal spontaneous and phasic flow.    There is a complex left popliteal fossa cyst measuring 2.2 x 3.3 x 1.2 cm    IMPRESSION:     No evidence of bilateral lower extremity deep venous thrombosis.    Left Baker's cyst.      Cultures:    Culture - Urine (12.08.18 @ 00:11)    Specimen Source: .Urine Clean Catch (Midstream)    Culture Results:   10,000 - 49,000 CFU/mL Streptococcus agalactiae (Group B) isolated  Group B streptococci are susceptible to ampicillin,  penicillin and cefazolin, but may be resistant to  erythromycin and clindamycin.  Recommendations for intrapartum prophylaxis for Group B  streptococci are penicillin or ampicillin.

## 2018-12-10 NOTE — PROGRESS NOTE ADULT - SUBJECTIVE AND OBJECTIVE BOX
MARTI ALLEN:[id number],   89yFemale followed for:  No Known Allergies    PAST MEDICAL & SURGICAL HISTORY:  Small bowel obstruction  CHF (congestive heart failure)  Atrial fibrillation  Hypertension  Hyperlipidemia  Arthritis  HTN (hypertension)  H/O abdominal hysterectomy  History of cholecystectomy    FAMILY HISTORY:  No pertinent family history in first degree relatives    MEDICATIONS  (STANDING):  ALBUTerol/ipratropium for Nebulization 3 milliLiter(s) Nebulizer every 6 hours  apixaban 2.5 milliGRAM(s) Oral every 12 hours  artificial  tears Solution 1 Drop(s) Both EYES daily  docusate sodium 100 milliGRAM(s) Oral three times a day  furosemide    Tablet 40 milliGRAM(s) Oral daily  levoFLOXacin  Tablet 750 milliGRAM(s) Oral every 48 hours  lisinopril 40 milliGRAM(s) Oral daily  multivitamin/minerals 1 Tablet(s) Oral daily    MEDICATIONS  (PRN):  acetaminophen   Tablet .. 650 milliGRAM(s) Oral every 6 hours PRN Temp greater or equal to 38C (100.4F), Moderate Pain (4 - 6)  guaiFENesin   Syrup  (Sugar-Free) 100 milliGRAM(s) Oral every 6 hours PRN Cough  ondansetron Injectable 4 milliGRAM(s) IV Push every 6 hours PRN Nausea      Vital Signs Last 24 Hrs  T(C): 36.6 (10 Dec 2018 04:38), Max: 36.8 (09 Dec 2018 17:54)  T(F): 97.8 (10 Dec 2018 04:38), Max: 98.3 (09 Dec 2018 17:54)  HR: 76 (10 Dec 2018 04:38) (76 - 82)  BP: 116/57 (10 Dec 2018 04:38) (93/58 - 121/70)  BP(mean): --  RR: 18 (10 Dec 2018 04:38) (18 - 18)  SpO2: 98% (10 Dec 2018 04:38) (97% - 99%)  nc/at  s1s2  cta  soft, nt, nd no guarding or rebound  no c/c/e    CBC Full  -  ( 10 Dec 2018 07:52 )  WBC Count : 10.41 K/uL  Hemoglobin : 11.4 g/dL  Hematocrit : 33.9 %  Platelet Count - Automated : 376 K/uL  Mean Cell Volume : 92.1 fl  Mean Cell Hemoglobin : 31.0 pg  Mean Cell Hemoglobin Concentration : 33.6 gm/dL  Auto Neutrophil # : x  Auto Lymphocyte # : x  Auto Monocyte # : x  Auto Eosinophil # : x  Auto Basophil # : x  Auto Neutrophil % : x  Auto Lymphocyte % : x  Auto Monocyte % : x  Auto Eosinophil % : x  Auto Basophil % : x    12-10    131<L>  |  92<L>  |  10  ----------------------------<  87  4.0   |  26  |  0.77    Ca    8.6      10 Dec 2018 06:14    TPro  6.2  /  Alb  2.8<L>  /  TBili  1.2  /  DBili  x   /  AST  21  /  ALT  15  /  AlkPhos  133<H>  12-09

## 2018-12-10 NOTE — DISCHARGE NOTE ADULT - MEDICATION SUMMARY - MEDICATIONS TO TAKE
I will START or STAY ON the medications listed below when I get home from the hospital:    Robitussin sugar -free syrup  -- 5 milliliter(s) by mouth every 6 hours, As Needed  cough   -- Indication: For cough    Tylenol 8 Hour 650 mg oral tablet, extended release  -- 1 tab(s) by mouth , As Needed  -- Indication: For Mild pain     lisinopril 40 mg oral tablet  -- 1 tab(s) by mouth once a day  -- Indication: For blood pressure     Eliquis 2.5 mg oral tablet  -- 1 tab(s) by mouth 2 times a day   -- Check with your doctor before becoming pregnant.  It is very important that you take or use this exactly as directed.  Do not skip doses or discontinue unless directed by your doctor.  Obtain medical advice before taking any non-prescription drugs as some may affect the action of this medication.    -- Indication: For Atrial fibrillation, unspecified type    ipratropium-albuterol 0.5 mg-2.5 mg/3 mLinhalation solution  -- 3 milliliter(s) inhaled every 6 hours  -- Indication: For inhaltion therapy - bronchodilator    furosemide 40 mg oral tablet  -- 1 tab(s) by mouth once a day  -- Indication: For water pill     docusate sodium 100 mg oral capsule  -- 1 cap(s) by mouth 3 times a day  -- Indication: For Stool softner     potassium chloride 10 mEq oral capsule, extended release  -- 1 tab(s) by mouth once a day  -- Indication: For Potassium supplement    Saline Mist 0.65% nasal spray  -- 1 spray(s) into nose once a day, As Needed  -- Indication: For nasal mist     ocular lubricant ophthalmic solution  -- 1 drop(s) to each affected eye once a day, As Needed  -- Indication: For Eye lubricant for dry eyse     levoFLOXacin 750 mg oral tablet  -- 1 tab(s) by mouth every 48 hours last dose on 12/13/18  -- Indication: For Antibiotic  - take on 12/13/18    Centrum oral tablet  -- 1 tab(s) by mouth once a day  -- Indication: For vitamin supplement

## 2018-12-10 NOTE — CHART NOTE - NSCHARTNOTEFT_GEN_A_CORE
Notified by RN that patient had 10 beats of Wide complex tachycardia on tele. Patient asymptomatic. Tele reviewed. WCT/ ventricular aberrancy with HR in 150' during the event, broke to baseline Afib with HR in Notified by RN that patient had 10 beats of Wide complex tachycardia on tele. Patient asymptomatic. Tele reviewed. WCT/ ventricular aberrancy with HR in 150' during the event, broke to baseline Afib with HR in 70's.     Vital Signs Last 24 Hrs  T(C): 36.7 (10 Dec 2018 20:28), Max: 37 (10 Dec 2018 19:10)  T(F): 98 (10 Dec 2018 20:28), Max: 98.6 (10 Dec 2018 19:10)  HR: 92 (10 Dec 2018 20:28) (76 - 92)  BP: 124/72 (10 Dec 2018 20:28) (116/57 - 139/61)  BP(mean): --  RR: 18 (10 Dec 2018 20:28) (18 - 18)  SpO2: 98% (10 Dec 2018 20:28) (96% - 98%)    < from: Transthoracic Echocardiogram (11.17.18 @ 10:42) >    1. Mitral annular calcification. Moderate mitral  regurgitation.  2. Normal trileaflet aortic valve.  3. Aortic Root: 3.3 cm.  4. Severe left atrial enlargement.  5. Severe concentric left ventricular hypertrophy.  6. Normal Left Ventricular Systolic Function,  (EF = 55%)  7. Grade III diastolic dysfunction.  8. Normal right atrium.  9. Normal right ventricular size and function.  10. RV systolic pressure is mildly increased at41 mm Hg.  11. There is mild tricuspid regurgitation.  12. There is mild pulmonic regurgitation.  13. Normal pericardium with no pericardial effusion.  14. Left pleural effusion.    89y f w PMhx HTN, DM, recent volvulus around fallopian tube s/p decompression, SBO s/p 11/16 exploratory laparotomy w/ MARIA ISABEL, recent diagnosis of CHFpEF ( EF= 55 % in 11/2018), afib on elequis, p/w elevated BNP to 8700 from outside hospital where patient was seen due to Nausea and vomiting. Patient is admitted with acute heart failure with preserved EF.    Patient now with 10 beats of WCT/ ventricular aberrancy, had 5 beats of WCT on 12/8.  Baseline Afib with HR in 70's. patient asymptomatic  Stat electrolytes ordered, F/U results  F/U with cardiology in am  Will follow closely  Will update with primary team    Yanira An [facility]  [id number]

## 2018-12-10 NOTE — DISCHARGE NOTE ADULT - MEDICATION SUMMARY - MEDICATIONS TO STOP TAKING
I will STOP taking the medications listed below when I get home from the hospital:    Norvasc 5 mg oral tablet  -- 1 tab(s) by mouth once a day    furosemide 20 mg oral tablet  -- 3 tab(s) by mouth once a day   -- Avoid prolonged or excessive exposure to direct and/or artificial sunlight while taking this medication.  It is very important that you take or use this exactly as directed.  Do not skip doses or discontinue unless directed by your doctor.  It may be advisable to drink a full glass orange juice or eat a banana daily while taking this medication.

## 2018-12-10 NOTE — DISCHARGE NOTE ADULT - PATIENT PORTAL LINK FT
You can access the [facility] Patient Portal, offered by [facility], by registering with the following website: [email or web address]

## 2018-12-10 NOTE — PROGRESS NOTE ADULT - SUBJECTIVE AND OBJECTIVE BOX
CHIEF COMPLAINT:    Interval Events:    REVIEW OF SYSTEMS:  [x] All other systems negative  [ ] Unable to assess ROS because ________    OBJECTIVE:  ICU Vital Signs Last 24 Hrs  T(C): 36.6 (10 Dec 2018 04:38), Max: 36.8 (09 Dec 2018 17:54)  T(F): 97.8 (10 Dec 2018 04:38), Max: 98.3 (09 Dec 2018 17:54)  HR: 76 (10 Dec 2018 04:38) (76 - 82)  BP: 116/57 (10 Dec 2018 04:38) (93/58 - 121/70)  BP(mean): --  ABP: --  ABP(mean): --  RR: 18 (10 Dec 2018 04:38) (18 - 18)  SpO2: 98% (10 Dec 2018 04:38) (97% - 99%)        12-09 @ 07:01  -  12-10 @ 07:00  --------------------------------------------------------  IN: 600 mL / OUT: 950 mL / NET: -350 mL      CAPILLARY BLOOD GLUCOSE      POCT Blood Glucose.: 102 mg/dL (09 Dec 2018 16:45)      PHYSICAL EXAM:  GENERAL: NAD  HEENT:  Atraumatic, Normocephalic  EYES: EOMI, PERRLA, conjunctiva and sclera clear  NECK: Supple, No JVD  CHEST/LUNG: decreased breath sounds right base  HEART: Regular rate and rhythm; No murmurs, rubs, or gallops  ABDOMEN: Soft, Nontender, Nondistended; Bowel sounds present  EXTREMITIES:  1+ pitting edema b/l LE  PSYCH: AAOx3  NEUROLOGY: non-focal exam  SKIN: No rashes or lesions    HOSPITAL MEDICATIONS:  MEDICATIONS  (STANDING):  ALBUTerol/ipratropium for Nebulization 3 milliLiter(s) Nebulizer every 6 hours  apixaban 2.5 milliGRAM(s) Oral every 12 hours  artificial  tears Solution 1 Drop(s) Both EYES daily  docusate sodium 100 milliGRAM(s) Oral three times a day  furosemide    Tablet 40 milliGRAM(s) Oral daily  levoFLOXacin  Tablet 750 milliGRAM(s) Oral every 48 hours  lisinopril 40 milliGRAM(s) Oral daily  multivitamin/minerals 1 Tablet(s) Oral daily    MEDICATIONS  (PRN):  acetaminophen   Tablet .. 650 milliGRAM(s) Oral every 6 hours PRN Temp greater or equal to 38C (100.4F), Moderate Pain (4 - 6)  guaiFENesin   Syrup  (Sugar-Free) 100 milliGRAM(s) Oral every 6 hours PRN Cough  ondansetron Injectable 4 milliGRAM(s) IV Push every 6 hours PRN Nausea      LABS:                        11.8   10.05 )-----------( 360      ( 09 Dec 2018 08:11 )             34.2     12-10    131<L>  |  92<L>  |  10  ----------------------------<  87  4.0   |  26  |  0.77    Ca    8.6      10 Dec 2018 06:14  Mg     1.6     12-08    TPro  6.2  /  Alb  2.8<L>  /  TBili  1.2  /  DBili  x   /  AST  21  /  ALT  15  /  AlkPhos  133<H>  12-09              MICROBIOLOGY:     RADIOLOGY:  [ ] Reviewed and interpreted by me    Point of Care Ultrasound Findings:    PFT:    EKG: CHIEF COMPLAINT: SOB    Interval Events: Still coughing this morning but overall feels better. Only noting arthritis in her bilateral hands    REVIEW OF SYSTEMS:  [x] All other systems negative  [ ] Unable to assess ROS because ________    OBJECTIVE:  ICU Vital Signs Last 24 Hrs  T(C): 36.6 (10 Dec 2018 04:38), Max: 36.8 (09 Dec 2018 17:54)  T(F): 97.8 (10 Dec 2018 04:38), Max: 98.3 (09 Dec 2018 17:54)  HR: 76 (10 Dec 2018 04:38) (76 - 82)  BP: 116/57 (10 Dec 2018 04:38) (93/58 - 121/70)  BP(mean): --  ABP: --  ABP(mean): --  RR: 18 (10 Dec 2018 04:38) (18 - 18)  SpO2: 98% (10 Dec 2018 04:38) (97% - 99%)        12-09 @ 07:01  -  12-10 @ 07:00  --------------------------------------------------------  IN: 600 mL / OUT: 950 mL / NET: -350 mL      CAPILLARY BLOOD GLUCOSE      POCT Blood Glucose.: 102 mg/dL (09 Dec 2018 16:45)      PHYSICAL EXAM:  GENERAL: NAD  HEENT:  Atraumatic, Normocephalic  EYES: EOMI, PERRLA, conjunctiva and sclera clear  NECK: Supple, No JVD  CHEST/LUNG: decreased breath sounds right base  HEART: Regular rate and rhythm; No murmurs, rubs, or gallops  ABDOMEN: Soft, Nontender, Nondistended; Bowel sounds present  EXTREMITIES:  1+ pitting edema b/l LE  PSYCH: AAOx3  NEUROLOGY: non-focal exam  SKIN: No rashes or lesions    HOSPITAL MEDICATIONS:  MEDICATIONS  (STANDING):  ALBUTerol/ipratropium for Nebulization 3 milliLiter(s) Nebulizer every 6 hours  apixaban 2.5 milliGRAM(s) Oral every 12 hours  artificial  tears Solution 1 Drop(s) Both EYES daily  docusate sodium 100 milliGRAM(s) Oral three times a day  furosemide    Tablet 40 milliGRAM(s) Oral daily  levoFLOXacin  Tablet 750 milliGRAM(s) Oral every 48 hours  lisinopril 40 milliGRAM(s) Oral daily  multivitamin/minerals 1 Tablet(s) Oral daily    MEDICATIONS  (PRN):  acetaminophen   Tablet .. 650 milliGRAM(s) Oral every 6 hours PRN Temp greater or equal to 38C (100.4F), Moderate Pain (4 - 6)  guaiFENesin   Syrup  (Sugar-Free) 100 milliGRAM(s) Oral every 6 hours PRN Cough  ondansetron Injectable 4 milliGRAM(s) IV Push every 6 hours PRN Nausea      LABS:                        11.8   10.05 )-----------( 360      ( 09 Dec 2018 08:11 )             34.2     12-10    131<L>  |  92<L>  |  10  ----------------------------<  87  4.0   |  26  |  0.77    Ca    8.6      10 Dec 2018 06:14  Mg     1.6     12-08    TPro  6.2  /  Alb  2.8<L>  /  TBili  1.2  /  DBili  x   /  AST  21  /  ALT  15  /  AlkPhos  133<H>  12-09              MICROBIOLOGY:     RADIOLOGY:  [x ] Reviewed and interpreted by me

## 2018-12-10 NOTE — DISCHARGE NOTE ADULT - CARE PLAN
Principal Discharge DX:	Acute on chronic diastolic congestive heart failure  Goal:	to remain without worsened or reoccurring symptoms of heart failure including shortness of breath, weight gain and or swelling  Assessment and plan of treatment:	Weigh yourself daily.  If you gain 3lbs in 3 days, or 5lbs in a week call your Health Care Provider.  Do not eat or drink foods containing more than 2000mg of salt (sodium) in your diet every day.  Call your Health Care Provider if you have any swelling or increased swelling in your feet, ankles, and/or stomach.  Take all of your medication as directed.  If you become dizzy call your Health Care Provider.  Secondary Diagnosis:	Pneumonia  Assessment and plan of treatment:	Pneumonia is a lung infection that can cause a fever, cough, and trouble breathing.  Continue all antibiotics as ordered until complete.  Nutrition is important, eat small frequent meals.  Get lots of rest and drink fluids.  Call your health care provider upon arrival home from hospital and make a follow up appointment for one week.  If your cough worsens, you develop fever greater than 101', you have shaking chills, a fast heartbeat, trouble breathing and/or feel your are breathing much faster than usual, call your healthcare provider.  Make sure you wash your hands frequently.  Secondary Diagnosis:	Atrial fibrillation, unspecified type  Assessment and plan of treatment:	Atrial fibrillation is the most common heart rhythm problem.  The condition puts you at risk for has stroke and heart attack  It helps if you control your blood pressure, not drink more than 1-2 alcohol drinks per day, cut down on caffeine, getting treatment for over active thyroid gland, and get regular exercise  Call your doctor if you feel your heart racing or beating unusually, chest tightness or pain, lightheaded, faint, shortness of breath especially with exercise  It is important to take your heart medication as prescribed  You may be on anticoagulation which is very important to take as directed - you may need blood work to monitor drug levels  Secondary Diagnosis:	Hyponatremia  Assessment and plan of treatment:	Continue current medications, follow up with PMD for management  Secondary Diagnosis:	Hypertension  Assessment and plan of treatment:	Follow up with your medical doctor to establish long term blood pressure treatment goals.  Secondary Diagnosis:	Small bowel obstruction  Goal:	Recent surgery with abdominal wound dehiscence  Assessment and plan of treatment:	Follow up with Surgery as directed, Dr. Xavier  Follow up at [facility] [address] [place] [address] [phone]

## 2018-12-10 NOTE — DISCHARGE NOTE ADULT - HOSPITAL COURSE
to be done 89y f w PMhx HTN, DM, recent volvulus around fallopian tube s/p decompression, SBO s/p 11/16 exploratory laparotomy w/ MARIA ISABEL, recent diagnosis of CHFpEF ( EF= 55 % in 11/2018), afib on elequis, p/w elevated BNP to 8700. Pt was signed out AMA from [facility], where she had been admitted for intractable nausea/vomiting w/ abd pain earlier this week; when PMD saw most recent lab results, she instructed family to bring pt back to hospital for re-admission. Family was unhappy w/ [facility] and so opted to bring pt here. Pt is no longer vomiting. She has pitting edema +4 in lower extremities, significant cough.  of note, patient was recently in the hospital for (11/27/18-12/1/18 ) for CHF.      Problem/plan # 1:   CHF    - Contniue Lasix 40 mg po daily    - Continue  Lisonopril 40 daily    Problem/Plan # 2:  Productive cough possibly due to viral pna vs aspiration pna    - RVP negative    - Continuie Levofloxacin 750 mg po renal dose   last dose on 12/13    - Contniue Robitussin q 6 as needed for cough   - continue Duonebs     Problem/Plan # 3  Afib rate controlled,  - Contnue Eliquis   - Doppler negative for DVT     Problem/plan # 4 Hyponatremia   -  Fluid restrict 1000 cc per day   - Dysphagia mecahical soft diet     Problem/plan # 5 Abdominal wound   - Follow up in the wound care center for further treatment   - Follow up with Dr. Leonard (colon Rectal Surgery) within 1-2 week after discharge

## 2018-12-11 VITALS
TEMPERATURE: 98 F | HEART RATE: 74 BPM | SYSTOLIC BLOOD PRESSURE: 128 MMHG | RESPIRATION RATE: 18 BRPM | WEIGHT: 110.45 LBS | DIASTOLIC BLOOD PRESSURE: 68 MMHG | OXYGEN SATURATION: 100 %

## 2018-12-11 LAB
ANION GAP SERPL CALC-SCNC: 13 MMOL/L — SIGNIFICANT CHANGE UP (ref 5–17)
BUN SERPL-MCNC: 10 MG/DL — SIGNIFICANT CHANGE UP (ref 7–23)
CALCIUM SERPL-MCNC: 8.4 MG/DL — SIGNIFICANT CHANGE UP (ref 8.4–10.5)
CHLORIDE SERPL-SCNC: 90 MMOL/L — LOW (ref 96–108)
CO2 SERPL-SCNC: 25 MMOL/L — SIGNIFICANT CHANGE UP (ref 22–31)
CREAT SERPL-MCNC: 0.78 MG/DL — SIGNIFICANT CHANGE UP (ref 0.5–1.3)
GLUCOSE SERPL-MCNC: 88 MG/DL — SIGNIFICANT CHANGE UP (ref 70–99)
HCT VFR BLD CALC: 34.2 % — LOW (ref 34.5–45)
HGB BLD-MCNC: 11.6 G/DL — SIGNIFICANT CHANGE UP (ref 11.5–15.5)
MCHC RBC-ENTMCNC: 31.9 PG — SIGNIFICANT CHANGE UP (ref 27–34)
MCHC RBC-ENTMCNC: 33.8 GM/DL — SIGNIFICANT CHANGE UP (ref 32–36)
MCV RBC AUTO: 94.4 FL — SIGNIFICANT CHANGE UP (ref 80–100)
PLATELET # BLD AUTO: 336 K/UL — SIGNIFICANT CHANGE UP (ref 150–400)
POTASSIUM SERPL-MCNC: 4 MMOL/L — SIGNIFICANT CHANGE UP (ref 3.5–5.3)
POTASSIUM SERPL-SCNC: 4 MMOL/L — SIGNIFICANT CHANGE UP (ref 3.5–5.3)
RBC # BLD: 3.62 M/UL — LOW (ref 3.8–5.2)
RBC # FLD: 14.2 % — SIGNIFICANT CHANGE UP (ref 10.3–14.5)
SODIUM SERPL-SCNC: 128 MMOL/L — LOW (ref 135–145)
WBC # BLD: 8.8 K/UL — SIGNIFICANT CHANGE UP (ref 3.8–10.5)
WBC # FLD AUTO: 8.8 K/UL — SIGNIFICANT CHANGE UP (ref 3.8–10.5)

## 2018-12-11 PROCEDURE — 83880 ASSAY OF NATRIURETIC PEPTIDE: CPT

## 2018-12-11 PROCEDURE — 80053 COMPREHEN METABOLIC PANEL: CPT

## 2018-12-11 PROCEDURE — 82962 GLUCOSE BLOOD TEST: CPT

## 2018-12-11 PROCEDURE — 87086 URINE CULTURE/COLONY COUNT: CPT

## 2018-12-11 PROCEDURE — 87633 RESP VIRUS 12-25 TARGETS: CPT

## 2018-12-11 PROCEDURE — 96374 THER/PROPH/DIAG INJ IV PUSH: CPT

## 2018-12-11 PROCEDURE — 84100 ASSAY OF PHOSPHORUS: CPT

## 2018-12-11 PROCEDURE — 80048 BASIC METABOLIC PNL TOTAL CA: CPT

## 2018-12-11 PROCEDURE — 94640 AIRWAY INHALATION TREATMENT: CPT

## 2018-12-11 PROCEDURE — 87486 CHLMYD PNEUM DNA AMP PROBE: CPT

## 2018-12-11 PROCEDURE — 84300 ASSAY OF URINE SODIUM: CPT

## 2018-12-11 PROCEDURE — 99285 EMERGENCY DEPT VISIT HI MDM: CPT | Mod: 25

## 2018-12-11 PROCEDURE — 85027 COMPLETE CBC AUTOMATED: CPT

## 2018-12-11 PROCEDURE — 83735 ASSAY OF MAGNESIUM: CPT

## 2018-12-11 PROCEDURE — 87581 M.PNEUMON DNA AMP PROBE: CPT

## 2018-12-11 PROCEDURE — 84484 ASSAY OF TROPONIN QUANT: CPT

## 2018-12-11 PROCEDURE — 87798 DETECT AGENT NOS DNA AMP: CPT

## 2018-12-11 PROCEDURE — 85730 THROMBOPLASTIN TIME PARTIAL: CPT

## 2018-12-11 PROCEDURE — 74019 RADEX ABDOMEN 2 VIEWS: CPT

## 2018-12-11 PROCEDURE — 93005 ELECTROCARDIOGRAM TRACING: CPT

## 2018-12-11 PROCEDURE — 81001 URINALYSIS AUTO W/SCOPE: CPT

## 2018-12-11 PROCEDURE — 71045 X-RAY EXAM CHEST 1 VIEW: CPT

## 2018-12-11 PROCEDURE — 85610 PROTHROMBIN TIME: CPT

## 2018-12-11 PROCEDURE — 83935 ASSAY OF URINE OSMOLALITY: CPT

## 2018-12-11 RX ORDER — DOCUSATE SODIUM 100 MG
1 CAPSULE ORAL
Qty: 90 | Refills: 0 | OUTPATIENT
Start: 2018-12-11 | End: 2019-01-09

## 2018-12-11 RX ORDER — IPRATROPIUM/ALBUTEROL SULFATE 18-103MCG
3 AEROSOL WITH ADAPTER (GRAM) INHALATION
Qty: 540 | Refills: 0 | OUTPATIENT
Start: 2018-12-11 | End: 2019-01-09

## 2018-12-11 RX ORDER — CIPROFLOXACIN LACTATE 400MG/40ML
1 VIAL (ML) INTRAVENOUS
Qty: 1 | Refills: 0 | OUTPATIENT
Start: 2018-12-11 | End: 2018-12-11

## 2018-12-11 RX ORDER — FUROSEMIDE 40 MG
1 TABLET ORAL
Qty: 30 | Refills: 0 | OUTPATIENT
Start: 2018-12-11 | End: 2019-01-09

## 2018-12-11 RX ADMIN — Medication 650 MILLIGRAM(S): at 03:00

## 2018-12-11 RX ADMIN — Medication 650 MILLIGRAM(S): at 12:20

## 2018-12-11 RX ADMIN — Medication 650 MILLIGRAM(S): at 11:22

## 2018-12-11 RX ADMIN — Medication 1 TABLET(S): at 11:22

## 2018-12-11 RX ADMIN — Medication 100 MILLIGRAM(S): at 17:40

## 2018-12-11 RX ADMIN — APIXABAN 2.5 MILLIGRAM(S): 2.5 TABLET, FILM COATED ORAL at 17:40

## 2018-12-11 RX ADMIN — Medication 100 MILLIGRAM(S): at 01:49

## 2018-12-11 RX ADMIN — Medication 1 DROP(S): at 11:22

## 2018-12-11 RX ADMIN — Medication 100 MILLIGRAM(S): at 05:19

## 2018-12-11 RX ADMIN — Medication 40 MILLIGRAM(S): at 05:19

## 2018-12-11 RX ADMIN — Medication 3 MILLILITER(S): at 17:39

## 2018-12-11 RX ADMIN — Medication 100 MILLIGRAM(S): at 11:21

## 2018-12-11 RX ADMIN — Medication 650 MILLIGRAM(S): at 01:52

## 2018-12-11 RX ADMIN — Medication 3 MILLILITER(S): at 11:21

## 2018-12-11 RX ADMIN — Medication 650 MILLIGRAM(S): at 17:39

## 2018-12-11 RX ADMIN — Medication 3 MILLILITER(S): at 05:19

## 2018-12-11 RX ADMIN — Medication 100 MILLIGRAM(S): at 11:22

## 2018-12-11 RX ADMIN — APIXABAN 2.5 MILLIGRAM(S): 2.5 TABLET, FILM COATED ORAL at 05:19

## 2018-12-11 RX ADMIN — LISINOPRIL 40 MILLIGRAM(S): 2.5 TABLET ORAL at 05:19

## 2018-12-11 NOTE — PROGRESS NOTE ADULT - PROBLEM SELECTOR PLAN 2
Rate controlled   Cont Eliquis

## 2018-12-11 NOTE — PROGRESS NOTE ADULT - SUBJECTIVE AND OBJECTIVE BOX
Ladarius Brown MD  Interventional Cardiology  [place] Office : [phone] [place] [address] [facility] [address]  Tel:   [place] Office : [phone] [facility] [place] [address]  Tel: [phone]  Cell : [phone]    Subjective : Pt lying in bed comfortable, not in distress, denies any chest pain or SOB  	  MEDICATIONS:  apixaban 2.5 milliGRAM(s) Oral every 12 hours  furosemide    Tablet 40 milliGRAM(s) Oral daily  lisinopril 40 milliGRAM(s) Oral daily    levoFLOXacin  Tablet 750 milliGRAM(s) Oral every 48 hours    ALBUTerol/ipratropium for Nebulization 3 milliLiter(s) Nebulizer every 6 hours  guaiFENesin   Syrup  (Sugar-Free) 100 milliGRAM(s) Oral every 6 hours PRN    acetaminophen   Tablet .. 650 milliGRAM(s) Oral every 6 hours PRN  ondansetron Injectable 4 milliGRAM(s) IV Push every 6 hours PRN    docusate sodium 100 milliGRAM(s) Oral three times a day      artificial  tears Solution 1 Drop(s) Both EYES daily  multivitamin/minerals 1 Tablet(s) Oral daily      PHYSICAL EXAM:  T(C): 36.6 (12-11-18 @ 11:44), Max: 36.7 (12-11-18 @ 04:39)  HR: 74 (12-11-18 @ 11:44) (74 - 77)  BP: 128/68 (12-11-18 @ 11:44) (114/63 - 128/68)  RR: 18 (12-11-18 @ 11:44) (18 - 18)  SpO2: 100% (12-11-18 @ 11:44) (96% - 100%)  Wt(kg): --  I&O's Summary    10 Dec 2018 07:01  -  11 Dec 2018 07:00  --------------------------------------------------------  IN: 815 mL / OUT: 700 mL / NET: 115 mL    11 Dec 2018 07:01  -  11 Dec 2018 22:32  --------------------------------------------------------  IN: 575 mL / OUT: 0 mL / NET: 575 mL        Appearance: Normal	  HEENT:   Normal oral mucosa, PERRL, EOMI	  Cardiovascular: Normal S1 S2, No JVD, No murmurs, No edema  Respiratory: Decrease BS B/L   Gastrointestinal:  Soft, Non-tender, + BS	  Extremities: Normal range of motion, No clubbing, cyanosis or edema                                      11.6   8.8   )-----------( 336      ( 11 Dec 2018 06:52 )             34.2     12-11    128<L>  |  90<L>  |  10  ----------------------------<  88  4.0   |  25  |  0.78    Ca    8.4      11 Dec 2018 06:52  Phos  3.1     12-10  Mg     1.9     12-10      proBNP:   Lipid Profile:   HgA1c:   TSH:

## 2018-12-11 NOTE — PROGRESS NOTE ADULT - PROBLEM SELECTOR PROBLEM 1
Acute on chronic diastolic congestive heart failure

## 2018-12-11 NOTE — PROGRESS NOTE ADULT - PROBLEM SELECTOR PLAN 3
MOst likely multifactorial ( CHF/s/P Vomiting )   No change in mentation as per son
fluid restriction

## 2018-12-11 NOTE — PROGRESS NOTE ADULT - PROBLEM SELECTOR PLAN 6
ABd Xrays done from Outside hospital revealed Dilatation of the small bowel suggestive of an ileus or incomplete obstruction/ a CT of Abd with IV contrast was done with no mention of Small Bowel   Will repeat abd xrays / place patient on clear liquid diet / Please call surgery and place NPO if abd xrays reveals any SBO   Wound call consult
ABd Xrays done from Outside hospital revealed Dilatation of the small bowel suggestive of an ileus or incomplete obstruction/ a CT of Abd with IV contrast was done with no mention of Small Bowel - benign abd exam  Will repeat abd xrays / place patient on clear liquid diet / Please call surgery and place NPO if abd xrays reveals any SBO   Wound call consult

## 2018-12-11 NOTE — PROGRESS NOTE ADULT - SUBJECTIVE AND OBJECTIVE BOX
INTERVAL HPI/OVERNIGHT EVENTS: family at bedside and acted as translators, no abd pain or vomiting, passing gas and having BMs, CT scan cancelled, d/c planning    MEDICATIONS  (STANDING):  ALBUTerol/ipratropium for Nebulization 3 milliLiter(s) Nebulizer every 6 hours  apixaban 2.5 milliGRAM(s) Oral every 12 hours  artificial  tears Solution 1 Drop(s) Both EYES daily  docusate sodium 100 milliGRAM(s) Oral three times a day  furosemide    Tablet 40 milliGRAM(s) Oral daily  levoFLOXacin  Tablet 750 milliGRAM(s) Oral every 48 hours  lisinopril 40 milliGRAM(s) Oral daily  multivitamin/minerals 1 Tablet(s) Oral daily    MEDICATIONS  (PRN):  acetaminophen   Tablet .. 650 milliGRAM(s) Oral every 6 hours PRN Temp greater or equal to 38C (100.4F), Moderate Pain (4 - 6)  guaiFENesin   Syrup  (Sugar-Free) 100 milliGRAM(s) Oral every 6 hours PRN Cough  ondansetron Injectable 4 milliGRAM(s) IV Push every 6 hours PRN Nausea      Allergies    No Known Allergies    Intolerances            PHYSICAL EXAM:   Vital Signs:  Vital Signs Last 24 Hrs  T(C): 36.7 (11 Dec 2018 04:39), Max: 37 (10 Dec 2018 19:10)  T(F): 98 (11 Dec 2018 04:39), Max: 98.6 (10 Dec 2018 19:10)  HR: 77 (11 Dec 2018 04:39) (77 - 92)  BP: 114/63 (11 Dec 2018 04:39) (114/63 - 139/61)  BP(mean): --  RR: 18 (11 Dec 2018 04:39) (18 - 18)  SpO2: 96% (11 Dec 2018 04:39) (96% - 98%)  Daily     Daily Weight in k.1 (10 Dec 2018 15:14)    GENERAL:  no distress  HEENT:  NC/AT,  anicteric  CHEST:   no increased effort, breath sounds clear  HEART:  Regular rhythm  ABDOMEN:  Soft, non-tender, non-distended, normoactive bowel sounds,  no masses no signs of obstruction  EXTEREMITIES:  no cyanosis      LABS:                        11.6   8.8   )-----------( 336      ( 11 Dec 2018 06:52 )             34.2     12-11    128<L>  |  90<L>  |  10  ----------------------------<  88  4.0   |  25  |  0.78    Ca    8.4      11 Dec 2018 06:52  Phos  3.1     12-10  Mg     1.9     12-10            RADIOLOGY & ADDITIONAL TESTS:

## 2018-12-11 NOTE — PROGRESS NOTE ADULT - REASON FOR ADMISSION
Abnormal Lab ( high BNP)  from PCP

## 2018-12-11 NOTE — PROGRESS NOTE ADULT - SUBJECTIVE AND OBJECTIVE BOX
SUBJECTIVE / OVERNIGHT EVENTS: c/o cough / shortness of breath on exertion     MEDICATIONS  (STANDING):  ALBUTerol/ipratropium for Nebulization 3 milliLiter(s) Nebulizer every 6 hours  apixaban 2.5 milliGRAM(s) Oral every 12 hours  artificial  tears Solution 1 Drop(s) Both EYES daily  docusate sodium 100 milliGRAM(s) Oral three times a day  furosemide    Tablet 40 milliGRAM(s) Oral daily  levoFLOXacin  Tablet 750 milliGRAM(s) Oral every 48 hours  lisinopril 40 milliGRAM(s) Oral daily  multivitamin/minerals 1 Tablet(s) Oral daily    MEDICATIONS  (PRN):  acetaminophen   Tablet .. 650 milliGRAM(s) Oral every 6 hours PRN Temp greater or equal to 38C (100.4F), Moderate Pain (4 - 6)  guaiFENesin   Syrup  (Sugar-Free) 100 milliGRAM(s) Oral every 6 hours PRN Cough  ondansetron Injectable 4 milliGRAM(s) IV Push every 6 hours PRN Nausea    Vital Signs Last 24 Hrs  T(C): 36.6 (11 Dec 2018 11:44), Max: 37 (10 Dec 2018 19:10)  T(F): 97.9 (11 Dec 2018 11:44), Max: 98.6 (10 Dec 2018 19:10)  HR: 74 (11 Dec 2018 11:44) (74 - 92)  BP: 128/68 (11 Dec 2018 11:44) (114/63 - 139/61)  BP(mean): --  RR: 18 (11 Dec 2018 11:44) (18 - 18)  SpO2: 100% (11 Dec 2018 11:44) (96% - 100%)    Constitutional: No fever, fatigue  Skin: No rash.  Eyes: No recent vision problems or eye pain.  ENT: No congestion, ear pain, or sore throat.  Cardiovascular: No chest pain or palpation.  Respiratory: cough / sob+  Gastrointestinal: No abdominal pain, nausea, vomiting, or diarrhea.  Genitourinary: No dysuria.  Musculoskeletal: No joint swelling.  Neurologic: No headache.    PHYSICAL EXAM:  GENERAL: NAD  EYES: EOMI, PERRLA  NECK: Supple, No JVD  CHEST/LUNG: dec breath sounds at bases   HEART:  S1 , S2 +  ABDOMEN: soft, bs+  EXTREMITIES:  trace edema  NEUROLOGY:alert awake oriented     LABS:      128<L>  |  90<L>  |  10  ----------------------------<  88  4.0   |  25  |  0.78    Ca    8.4      11 Dec 2018 06:52  Phos  3.1     12-10  Mg     1.9     12-10      Creatinine Trend: 0.78 <--, 0.81 <--, 0.77 <--, 0.70 <--, 0.69 <--, 0.64 <--, 0.57 <--                        11.6   8.8   )-----------( 336      ( 11 Dec 2018 06:52 )             34.2     Urine Studies:  Urinalysis Basic - ( 07 Dec 2018 15:29 )    Color: Light Yellow / Appearance: Clear / S.007 / pH:   Gluc:  / Ketone: Negative  / Bili: Negative / Urobili: Negative   Blood:  / Protein: Negative / Nitrite: Negative   Leuk Esterase: Negative / RBC: 2 /hpf / WBC 1 /hpf   Sq Epi:  / Non Sq Epi: 1 /hpf / Bacteria: Negative      Osmolality, Random Urine: 276 mos/kg ( @ 21:37)  Sodium, Random Urine: 106 mmol/L ( @ 20:43)

## 2018-12-11 NOTE — PROGRESS NOTE ADULT - PROVIDER SPECIALTY LIST ADULT
Cardiology
Gastroenterology
Internal Medicine
Pulmonology
Pulmonology
Internal Medicine

## 2018-12-11 NOTE — PROGRESS NOTE ADULT - ASSESSMENT
89 F with htn, grade III diastolic HF, recent SBO s/p decompression and exploratory laparotomy requiring MARIA ISABEL, afib on Eliquis recently hospitalized for ADHF now here with sob and productive cough.    #productive cough, possibly due to viral pna vs aspiration pna  - would start course of zosyn for likely aspiration pna  - diuresis per primary team  - speech and swallow eval  - nebulizers prn    --  Teresita Childers MD  Pulmonary & Critical Care Medicine Fellow | PGY-7  [phone]
89y f w PMhx HTN, DM, recent volvulus around fallopian tube s/p decompression, SBO s/p 11/16 exploratory laparotomy w/ MARIA ISABEL, recent diagnosis of CHFpEF ( EF= 55 % in 11/2018), afib on elequis, p/w elevated BNP to 8700 from outside hospital where patient was seen due to Nausea and vomiting. Patient is admitted with acute heart failure with preserved EF.
EKG Afib RBBB, Inf q waves     Echo < from: Transthoracic Echocardiogram (11.17.18 @ 10:42) >  1. Mitral annular calcification. Moderate mitral  regurgitation.  2. Normal trileaflet aortic valve.  3. Aortic Root: 3.3 cm.  4. Severe left atrial enlargement.  5. Severe concentric left ventricular hypertrophy.  6. Normal Left Ventricular Systolic Function,  (EF = 55%)  7. Grade III diastolic dysfunction.  8. Normal right atrium.  9. Normal right ventricular size and function.  10. RV systolic pressure is mildly increased at41 mm Hg.  11. There is mild tricuspid regurgitation.  12. There is mild pulmonic regurgitation.  13. Normal pericardium with no pericardial effusion.  14. Left pleural effusion.    < end of copied text >    Assessment and Plan     1) HFpEF: c/w LASIX 40 po daily, Lisonopril 40 daily    2) Productive Cough: Pulm on board , On ABX     3) Afib rate controlled, on eliquis , metoprolol was d/donn last admission due to bradycardia to 30's     4) DVT PPX eliquis
EKG Afib RBBB, Inf q waves     Echo < from: Transthoracic Echocardiogram (11.17.18 @ 10:42) >  1. Mitral annular calcification. Moderate mitral  regurgitation.  2. Normal trileaflet aortic valve.  3. Aortic Root: 3.3 cm.  4. Severe left atrial enlargement.  5. Severe concentric left ventricular hypertrophy.  6. Normal Left Ventricular Systolic Function,  (EF = 55%)  7. Grade III diastolic dysfunction.  8. Normal right atrium.  9. Normal right ventricular size and function.  10. RV systolic pressure is mildly increased at41 mm Hg.  11. There is mild tricuspid regurgitation.  12. There is mild pulmonic regurgitation.  13. Normal pericardium with no pericardial effusion.  14. Left pleural effusion.    < end of copied text >    Assessment and Plan     1) HFpEF: c/w lasix 20 IV bid, Lisonopril 40 daily    2) Productive Cough: Pulm on board , 2 recent admissions , one with intubation for surgery, at risk for HCAP , on Levofloxacin      3) Afib rate controlled, on eliquis , metoprolol was d/donn last admission due to bradycardia to 30's     4) DVT PPX eliquis
EKG Afib RBBB, Inf q waves     Echo < from: Transthoracic Echocardiogram (11.17.18 @ 10:42) >  1. Mitral annular calcification. Moderate mitral  regurgitation.  2. Normal trileaflet aortic valve.  3. Aortic Root: 3.3 cm.  4. Severe left atrial enlargement.  5. Severe concentric left ventricular hypertrophy.  6. Normal Left Ventricular Systolic Function,  (EF = 55%)  7. Grade III diastolic dysfunction.  8. Normal right atrium.  9. Normal right ventricular size and function.  10. RV systolic pressure is mildly increased at41 mm Hg.  11. There is mild tricuspid regurgitation.  12. There is mild pulmonic regurgitation.  13. Normal pericardium with no pericardial effusion.  14. Left pleural effusion.    < end of copied text >    Assessment and Plan     1) HFpEF: c/w lasix 20 IV bid, Lisonopril 40 daily    2) Productive Cough: Pulm on board , 2 recent admissions , one with intubation for surgery, at risk for HCAP , on Levofloxacin      3) Afib rate controlled, on eliquis , metoprolol was d/donn last admission due to bradycardia to 30's     4) DVT PPX eliquis
EKG Afib RBBB, Inf q waves     Echo < from: Transthoracic Echocardiogram (11.17.18 @ 10:42) >  1. Mitral annular calcification. Moderate mitral  regurgitation.  2. Normal trileaflet aortic valve.  3. Aortic Root: 3.3 cm.  4. Severe left atrial enlargement.  5. Severe concentric left ventricular hypertrophy.  6. Normal Left Ventricular Systolic Function,  (EF = 55%)  7. Grade III diastolic dysfunction.  8. Normal right atrium.  9. Normal right ventricular size and function.  10. RV systolic pressure is mildly increased at41 mm Hg.  11. There is mild tricuspid regurgitation.  12. There is mild pulmonic regurgitation.  13. Normal pericardium with no pericardial effusion.  14. Left pleural effusion.    < end of copied text >    Assessment and Plan     1) HFpEF: c/w lasix 20 IV bid, Lisonopril 40 daily    2) Productive Cough: Pulm on board , 2 recent admissions , one with intubation for surgery, at risk for HCAP , procal pending     3) Afib rate controlled, on eliquis , metoprolol was d/donn last admission due to bradycardia to 30's     4) DVT PPX eliquis
case d/w son.  pt had surgery/ ally at [facility].  pt with scaphoid soft abdomen, secondary intent. await ct.  doubt recurrent sbo
clinically not obstructed.  await ct scan
no evidence of SBO, plan per medical team
89 F with htn, grade III diastolic HF, recent SBO s/p decompression and exploratory laparotomy requiring MARIA ISABEL, afib on Eliquis recently hospitalized for ADHF now here with sob and productive cough likely related to aspiration pna    #productive cough, possibly due to viral pna vs aspiration pna  - continue levaquin for aspiration pna  - diuresis per primary team  - speech and swallow eval  - nebulizers prn  - pulm to sign off; please call back with any questions    Fred Martinez MD, PGY4  Pulmonary and Critical Care Fellow  [id number]
89y f w PMhx HTN, DM, recent volvulus around fallopian tube s/p decompression, SBO s/p 11/16 exploratory laparotomy w/ MARIA ISABEL, recent diagnosis of CHFpEF ( EF= 55 % in 11/2018), afib on elequis, p/w elevated BNP to 8700 from outside hospital where patient was seen due to Nausea and vomiting. Patient is admitted with acute heart failure with preserved EF.

## 2018-12-11 NOTE — PROGRESS NOTE ADULT - PROBLEM SELECTOR PROBLEM 5
Mitral valve insufficiency, unspecified etiology

## 2018-12-11 NOTE — PROGRESS NOTE ADULT - PROBLEM SELECTOR PLAN 1
diuresis
diuresis  pulm/ cards input appreciated

## 2018-12-11 NOTE — PROGRESS NOTE ADULT - PROBLEM SELECTOR PLAN 4
COnt Lisinopril   Monitor BP

## 2020-03-08 PROBLEM — Z00.00 ENCOUNTER FOR PREVENTIVE HEALTH EXAMINATION: Status: ACTIVE | Noted: 2020-03-08

## 2020-03-10 ENCOUNTER — APPOINTMENT (OUTPATIENT)
Dept: ORTHOPEDIC SURGERY | Facility: CLINIC | Age: 85
End: 2020-03-10
Payer: MEDICARE

## 2020-03-10 VITALS — WEIGHT: 114 LBS | BODY MASS INDEX: 25.64 KG/M2 | HEIGHT: 56 IN

## 2020-03-10 DIAGNOSIS — Z78.9 OTHER SPECIFIED HEALTH STATUS: ICD-10-CM

## 2020-03-10 DIAGNOSIS — Z86.79 PERSONAL HISTORY OF OTHER DISEASES OF THE CIRCULATORY SYSTEM: ICD-10-CM

## 2020-03-10 DIAGNOSIS — Z87.39 PERSONAL HISTORY OF OTHER DISEASES OF THE MUSCULOSKELETAL SYSTEM AND CONNECTIVE TISSUE: ICD-10-CM

## 2020-03-10 PROCEDURE — 29085 APPL CAST HAND&LWR FOREARM: CPT | Mod: RT

## 2020-03-10 PROCEDURE — 73110 X-RAY EXAM OF WRIST: CPT | Mod: RT

## 2020-03-10 PROCEDURE — 99204 OFFICE O/P NEW MOD 45 MIN: CPT | Mod: 25

## 2020-03-10 NOTE — PHYSICAL EXAM
[de-identified] : Physical Examination\par General: well nourished, in no acute distress, alert and oriented to person, place and time\par Psychiatric: normal mood and affect, no abnormal movements or speech patterns\par Eyes: vision intact - glasses\par Throat: no thyromegaly\par Lymph: no enlarged nodes, no lymphedema in extremity\par Respiratory: no wheezing, no shortness of breath with ambulation\par Cardiac: no cardiac leg swelling, 2+ peripheral pulses\par Neurology: normal gross sensation in extremities to light touch\par Abdomen: soft, non-tender, tympanic, no masses\par \par Severely limited examination\par Hand			Right			Left\par Appearance\par      Skin			normal			normal\par      Swelling/Deformity	swelling of the hand with ecchymosis			normal\par  Range of Motion\par      Wrist Flexion		75&			75\par      Wrist Extension	60&			60\par      Finger Flexion  	0 cm palmar crease&	0 cm palmar crease\par      Finger Extension	Full&			Full\par      Supination		85&			85\par      Pronation		90&			90\par \par Palpation\par      Snuff box		-			-\par      ScaphoLunate 	-			-\par      ECU/Ulna Styloid	-			-\par      Cubital Tunnel 	-			-\par      OTHER		distal radius			-\par Strength Examination\par      Wrist Flexion		5+ / 0&			5+ / 0\par      Wrist Extension	5+ / 0&			5+ / 0\par      Finger Flexion  	5+ / 0&			5+ / 0\par      Finger Extension	5+ / 0&			5+ / 0\par      			-&			-\par      Pincer		-&			-\par Sensation\par      Axillary		normal			normal\par      LatAntCubBrach 	normal			normal\par      Median 		normal			normal\par      Ulnar 		normal			normal\par      Radial 		normal			normal\par Motor&\par      AIN 			normal			normal\par      Ulnar 		normal			normal\par      Radial 		normal			normal\par      PIN 			normal			normal\par Pulses&\par      Radial	 	2+			2+\par  [de-identified] : 3 views of the right wrist (AP, Oblique, Lateral)\par Were ordered taken and evaluated by myself today\par demonstrate:\par Distal radius intra-articular fracture with neutral volar tilt and loss of radial height and tilt\par 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

## 2020-03-10 NOTE — DISCUSSION/SUMMARY
[de-identified] : Right distal radius fracture and scapholunate widening osteoarthritic changes\par \par Short arm cast as patient unhappy with long-arm splint\par \par Elevate ice\par \par P.r.n. over-the-counter anti-inflammatories or Tylenol for the PMD\par \par Follow up in 4 weeks

## 2020-03-10 NOTE — HISTORY OF PRESENT ILLNESS
[de-identified] : CC R Hand\par \par HPI 89 yo female right HD presents with onset of 2 weeks of activity related pain in the right wrist after a fall. The pain is same, and rated a 6 out of 10, described as pain, [without radiation]. Nothing makes the pain better and everything makes the pain worse. The patient reports associated symptoms of swelling a neck hemostasis. The patient + pain at night affecting sleep, - neck pain, and - similar pain previously. unable to elicit exam from patient or obtain history\par \par The patient has tried the following treatments:\par Activity modification	+\par Ice			+\par Brace			+\par Nsaids    		-\par Physical Therapy  	-\par Cortisone Injection	-\par Arthroscopy/Surgery	-\par \par Review of Systems is positive for the above musculoskeletal symptoms and is otherwise non-contributory for general, constitutional, psychiatric, neurologic, HEENT, cardiac, respiratory, gastrointestinal, reproductive, lymphatic, and dermatologic complaints.\par \par Consult by

## 2020-03-31 ENCOUNTER — APPOINTMENT (OUTPATIENT)
Dept: ORTHOPEDIC SURGERY | Facility: CLINIC | Age: 85
End: 2020-03-31
Payer: MEDICARE

## 2020-03-31 VITALS — HEART RATE: 54 BPM | DIASTOLIC BLOOD PRESSURE: 53 MMHG | TEMPERATURE: 97.7 F | SYSTOLIC BLOOD PRESSURE: 104 MMHG

## 2020-03-31 PROCEDURE — 73110 X-RAY EXAM OF WRIST: CPT | Mod: RT

## 2020-03-31 PROCEDURE — 99214 OFFICE O/P EST MOD 30 MIN: CPT

## 2020-03-31 RX ORDER — RIVAROXABAN 15 MG/1
15 TABLET, FILM COATED ORAL
Qty: 30 | Refills: 0 | Status: ACTIVE | COMMUNITY
Start: 2020-01-06

## 2020-03-31 RX ORDER — POTASSIUM CHLORIDE 750 MG/1
10 TABLET, FILM COATED, EXTENDED RELEASE ORAL
Qty: 30 | Refills: 0 | Status: ACTIVE | COMMUNITY
Start: 2019-11-22

## 2020-03-31 NOTE — PHYSICAL EXAM
[de-identified] : Physical Examination\par General: well nourished, in no acute distress, alert and oriented to person, place and time\par Psychiatric: normal mood and affect, no abnormal movements or speech patterns\par Eyes: vision intact - glasses\par Throat: no thyromegaly\par Lymph: no enlarged nodes, no lymphedema in extremity\par Respiratory: no wheezing, no shortness of breath with ambulation\par Cardiac: no cardiac leg swelling, 2+ peripheral pulses\par Neurology: normal gross sensation in extremities to light touch\par Abdomen: soft, non-tender, tympanic, no masses\par \par limited examination SAC in place\par Hand			Right			Left\par Appearance\par      Skin			normal			normal\par      Swelling/Deformity	swelling of the hand with ecchymosis			normal\par  Range of Motion\par      Wrist Flexion		75&			75\par      Wrist Extension	60&			60\par      Finger Flexion  	0 cm palmar crease&	0 cm palmar crease\par      Finger Extension	Full&			Full\par      Supination		85&			85\par      Pronation		90&			90\par \par Palpation\par      Snuff box		-			-\par      ScaphoLunate 	-			-\par      ECU/Ulna Styloid	-			-\par      Cubital Tunnel 	-			-\par      OTHER		distal radius			-\par Strength Examination\par      Wrist Flexion		5+ / 0&			5+ / 0\par      Wrist Extension	5+ / 0&			5+ / 0\par      Finger Flexion  	5+ / 0&			5+ / 0\par      Finger Extension	5+ / 0&			5+ / 0\par      			-&			-\par      Pincer		-&			-\par Sensation\par      Axillary		normal			normal\par      LatAntCubBrach 	normal			normal\par      Median 		normal			normal\par      Ulnar 		normal			normal\par      Radial 		normal			normal\par Motor&\par      AIN 			normal			normal\par      Ulnar 		normal			normal\par      Radial 		normal			normal\par      PIN 			normal			normal\par Pulses&\par      Radial	 	2+			2+\par  [de-identified] : 3 views of the right wrist (AP, Oblique, Lateral)\par 3-10-20\par demonstrate:\par Distal radius intra-articular fracture with neutral volar tilt and loss of radial height and tilt\par 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\par \par 3 views of the right wrist (AP, Oblique, Lateral)\par Were ordered taken and evaluated by myself today\par demonstrate:\par Distal radius intra-articular fracture with neutral volar tilt and loss of radial height and tilt\par 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

## 2020-03-31 NOTE — DISCUSSION/SUMMARY
[de-identified] : Right distal radius fracture and scapholunate widening osteoarthritic changes\par \par Short arm cast for 2 more weeks\par \par Elevate ice\par \par P.r.n. over-the-counter anti-inflammatories or Tylenol for the PMD\par \par Follow up in 2 weeks

## 2020-03-31 NOTE — HISTORY OF PRESENT ILLNESS
[de-identified] : CC R Hand\par \par HPI 89 yo female right HD presents for 3 week FU of acute onset of 2 weeks of activity related pain in the right wrist after a fall. The pain is same, and rated a 6 out of 10, described as pain, [without radiation]. Nothing makes the pain better and everything makes the pain worse. The patient reports associated symptoms of swelling a neck hemostasis. The patient + pain at night affecting sleep, - neck pain, and - similar pain previously. unable to elicit exam from patient or obtain history\par \par The patient has tried the following treatments:\par Activity modification	+\par Ice			+\par Brace			+\par Nsaids    		-\par Physical Therapy  	-\par Cortisone Injection	-\par Arthroscopy/Surgery	-\par \par wrist remains painful benjamin in evening\par \par Review of Systems is positive for the above musculoskeletal symptoms and is otherwise non-contributory for general, constitutional, psychiatric, neurologic, HEENT, cardiac, respiratory, gastrointestinal, reproductive, lymphatic, and dermatologic complaints.\par \par Consult by

## 2020-04-01 ENCOUNTER — APPOINTMENT (OUTPATIENT)
Dept: ORTHOPEDIC SURGERY | Facility: CLINIC | Age: 85
End: 2020-04-01

## 2020-04-21 ENCOUNTER — APPOINTMENT (OUTPATIENT)
Dept: ORTHOPEDIC SURGERY | Facility: CLINIC | Age: 85
End: 2020-04-21
Payer: MEDICARE

## 2020-04-21 VITALS
HEIGHT: 56 IN | SYSTOLIC BLOOD PRESSURE: 149 MMHG | WEIGHT: 105 LBS | BODY MASS INDEX: 23.62 KG/M2 | HEART RATE: 53 BPM | TEMPERATURE: 97.5 F | DIASTOLIC BLOOD PRESSURE: 84 MMHG

## 2020-04-21 DIAGNOSIS — M19.131 POST-TRAUMATIC OSTEOARTHRITIS, RIGHT WRIST: ICD-10-CM

## 2020-04-21 DIAGNOSIS — S52.571A OTHER INTRAARTICULAR FRACTURE OF LOWER END OF RIGHT RADIUS, INITIAL ENCOUNTER FOR CLOSED FRACTURE: ICD-10-CM

## 2020-04-21 PROCEDURE — 99214 OFFICE O/P EST MOD 30 MIN: CPT

## 2020-04-21 PROCEDURE — 73110 X-RAY EXAM OF WRIST: CPT | Mod: RT

## 2020-04-21 RX ORDER — LISINOPRIL 40 MG/1
40 TABLET ORAL
Qty: 90 | Refills: 0 | Status: ACTIVE | COMMUNITY
Start: 2019-12-26

## 2020-04-21 NOTE — HISTORY OF PRESENT ILLNESS
[de-identified] : CC R Hand\par \par HPI 89 yo female right HD presents for 6 week FU of acute onset of 2 weeks of activity related pain in the right wrist after a fall. The pain is same, and rated a 6 out of 10, described as pain, [without radiation]. Nothing makes the pain better and everything makes the pain worse. The patient reports associated symptoms of swelling a neck hemostasis. The patient + pain at night affecting sleep, - neck pain, and - similar pain previously. unable to elicit exam from patient or obtain history\par \par The patient has tried the following treatments:\par Activity modification	+\par Ice			+\par Brace			+\par Nsaids    		-\par Physical Therapy  	-\par Cortisone Injection	-\par Arthroscopy/Surgery	-\par \par no pain, swelling better\par \par Review of Systems is positive for the above musculoskeletal symptoms and is otherwise non-contributory for general, constitutional, psychiatric, neurologic, HEENT, cardiac, respiratory, gastrointestinal, reproductive, lymphatic, and dermatologic complaints.\par \par Consult by

## 2020-04-21 NOTE — PHYSICAL EXAM
[de-identified] : Physical Examination\par General: well nourished, in no acute distress, alert and oriented to person, place and time\par Psychiatric: normal mood and affect, no abnormal movements or speech patterns\par Eyes: vision intact - glasses\par Throat: no thyromegaly\par Lymph: no enlarged nodes, no lymphedema in extremity\par Respiratory: no wheezing, no shortness of breath with ambulation\par Cardiac: no cardiac leg swelling, 2+ peripheral pulses\par Neurology: normal gross sensation in extremities to light touch\par Abdomen: soft, non-tender, tympanic, no masses\par \par Hand			Right			Left\par Appearance\par      Skin			normal			normal\par      Swelling/Deformity	resolved swelling of the hand with ecchymosis			normal\par  Range of Motion\par      Wrist Flexion		10			75\par      Wrist Extension	10			60\par      Finger Flexion  	0 cm palmar crease&	0 cm palmar crease\par      Finger Extension	limited			limited\par      Supination		85&			85\par      Pronation		90&			90\par \par Palpation\par      Snuff box		-			-\par      ScaphoLunate 	-			-\par      ECU/Ulna Styloid	-			-\par      Cubital Tunnel 	-			-\par      OTHER		-			-\par Strength Examination\par      Wrist Flexion		5+ / 0&			5+ / 0\par      Wrist Extension	5+ / 0&			5+ / 0\par      Finger Flexion  	5+ / 0&			5+ / 0\par      Finger Extension	5+ / 0&			5+ / 0\par      			-&			-\par      Pincer		-&			-\par Sensation\par      Axillary		normal			normal\par      LatAntCubBrach 	normal			normal\par      Median 		normal			normal\par      Ulnar 		normal			normal\par      Radial 		normal			normal\par Motor&\par      AIN 			normal			normal\par      Ulnar 		normal			normal\par      Radial 		normal			normal\par      PIN 			normal			normal\par Pulses&\par      Radial	 	2+			2+\par  [de-identified] : 3 views of the right wrist (AP, Oblique, Lateral)\par 3-10-20\par demonstrate:\par Distal radius intra-articular fracture with neutral volar tilt and loss of radial height and tilt\par 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\par \par 3 views of the right wrist (AP, Oblique, Lateral)\par 3-31-20\par demonstrate:\par Distal radius intra-articular fracture with neutral volar tilt and loss of radial height and tilt\par 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\par \par 3 views of the right wrist (AP, Oblique, Lateral)\par Were ordered taken and evaluated by myself today\par demonstrate:\par Distal radius intra-articular fracture with neutral volar tilt and loss of radial height and tilt unchanged and callous healing\par 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

## 2020-04-21 NOTE — DISCUSSION/SUMMARY
[de-identified] : Right distal radius fracture and scapholunate widening osteoarthritic changes\par \par wrist splint\par \par home exercises\par \par Elevate ice\par \par P.r.n. over-the-counter anti-inflammatories or Tylenol for the PMD\par \par Follow up in 10-12 weeks

## 2020-06-30 ENCOUNTER — APPOINTMENT (OUTPATIENT)
Dept: ORTHOPEDIC SURGERY | Facility: CLINIC | Age: 85
End: 2020-06-30

## 2020-08-17 DIAGNOSIS — R06.00 DYSPNEA, UNSPECIFIED: ICD-10-CM

## 2020-08-17 PROBLEM — I50.9 CHF (CONGESTIVE HEART FAILURE): Status: ACTIVE | Noted: 2020-08-17

## 2020-08-17 PROBLEM — I48.91 AFIB: Status: ACTIVE | Noted: 2020-08-17

## 2020-08-17 PROBLEM — I35.0 AORTIC STENOSIS: Status: ACTIVE | Noted: 2020-08-17

## 2020-08-17 PROBLEM — I10 BENIGN HYPERTENSION: Status: ACTIVE | Noted: 2020-08-17

## 2020-08-17 RX ORDER — GINKGO BILOBA 60 MG
60 TABLET ORAL DAILY
Refills: 0 | Status: ACTIVE | COMMUNITY

## 2020-08-17 RX ORDER — LEVOTHYROXINE SODIUM 0.03 MG/1
25 TABLET ORAL
Qty: 30 | Refills: 0 | Status: ACTIVE | COMMUNITY

## 2020-08-17 RX ORDER — MULTIVIT-MIN/FA/LYCOPEN/LUTEIN .4-300-25
TABLET ORAL DAILY
Refills: 0 | Status: ACTIVE | COMMUNITY

## 2020-08-18 ENCOUNTER — APPOINTMENT (OUTPATIENT)
Dept: CARDIOTHORACIC SURGERY | Facility: CLINIC | Age: 85
End: 2020-08-18
Payer: MEDICARE

## 2020-08-18 ENCOUNTER — OUTPATIENT (OUTPATIENT)
Dept: OUTPATIENT SERVICES | Facility: HOSPITAL | Age: 85
LOS: 1 days | End: 2020-08-18
Payer: COMMERCIAL

## 2020-08-18 VITALS
WEIGHT: 101 LBS | HEART RATE: 50 BPM | OXYGEN SATURATION: 94 % | RESPIRATION RATE: 30 BRPM | BODY MASS INDEX: 20.36 KG/M2 | HEIGHT: 59 IN | SYSTOLIC BLOOD PRESSURE: 143 MMHG | DIASTOLIC BLOOD PRESSURE: 88 MMHG

## 2020-08-18 DIAGNOSIS — I35.0 NONRHEUMATIC AORTIC (VALVE) STENOSIS: ICD-10-CM

## 2020-08-18 DIAGNOSIS — I48.91 UNSPECIFIED ATRIAL FIBRILLATION: ICD-10-CM

## 2020-08-18 DIAGNOSIS — J90 PLEURAL EFFUSION, NOT ELSEWHERE CLASSIFIED: ICD-10-CM

## 2020-08-18 DIAGNOSIS — I34.0 NONRHEUMATIC MITRAL (VALVE) INSUFFICIENCY: ICD-10-CM

## 2020-08-18 DIAGNOSIS — Z90.49 ACQUIRED ABSENCE OF OTHER SPECIFIED PARTS OF DIGESTIVE TRACT: Chronic | ICD-10-CM

## 2020-08-18 DIAGNOSIS — I50.23 ACUTE ON CHRONIC SYSTOLIC (CONGESTIVE) HEART FAILURE: ICD-10-CM

## 2020-08-18 DIAGNOSIS — I07.1 RHEUMATIC TRICUSPID INSUFFICIENCY: ICD-10-CM

## 2020-08-18 DIAGNOSIS — I50.9 HEART FAILURE, UNSPECIFIED: ICD-10-CM

## 2020-08-18 DIAGNOSIS — Z90.710 ACQUIRED ABSENCE OF BOTH CERVIX AND UTERUS: Chronic | ICD-10-CM

## 2020-08-18 DIAGNOSIS — I10 ESSENTIAL (PRIMARY) HYPERTENSION: ICD-10-CM

## 2020-08-18 PROCEDURE — 93306 TTE W/DOPPLER COMPLETE: CPT

## 2020-08-18 PROCEDURE — 93306 TTE W/DOPPLER COMPLETE: CPT | Mod: 26

## 2020-08-18 PROCEDURE — 99205 OFFICE O/P NEW HI 60 MIN: CPT

## 2020-08-18 PROCEDURE — 99204 OFFICE O/P NEW MOD 45 MIN: CPT

## 2020-08-18 RX ORDER — FUROSEMIDE 40 MG/1
40 TABLET ORAL TWICE DAILY
Qty: 20 | Refills: 0 | Status: ACTIVE | COMMUNITY

## 2020-08-18 RX ORDER — POTASSIUM CHLORIDE 750 MG/1
10 TABLET, EXTENDED RELEASE ORAL DAILY
Refills: 0 | Status: ACTIVE | COMMUNITY

## 2020-08-18 RX ORDER — LEVOTHYROXINE SODIUM 0.03 MG/1
25 TABLET ORAL DAILY
Refills: 0 | Status: ACTIVE | COMMUNITY

## 2020-08-18 NOTE — HISTORY OF PRESENT ILLNESS
[FreeTextEntry1] : This is a 89 year old female with past medical history of hypertension, Afib( On Xarelto) , SBO S/P Exp lap , lysis of adhesion, repair of umbilical hernia on 2/16/18 , HFpEF  and Known Aortic stenosis for 2 years with complaints of Dyspnea on exertion 6 months got worse past the 2 months , fatigue , pedal edema and palpitations . She is lethargic today, arousal to verbal and tactile stimuli but falls asleep easily. She is tachypneic, and reportedly had difficulty laying down for TTE today.She has PND and orthopnea that was previously alleviated with two pillows but she now has trouble sleeping at night. Her appetite has decreased and her son reports that he has to "force her to eat". She had multiple hospital admissions due to congestive heart failure, last one in Feb 2020. She can walk up to 25 feet without shortness of breath and uses 2 pillows to sleep . She is fully dependent with ADLs. As per her son, she is incontinent of urine for the past 1 week. Her STS score is 7.47%.She is referred by Dr. Guerrero to valve clinic for management for Mitral Regurgitation. Denies any chest pain, syncope .

## 2020-08-18 NOTE — REVIEW OF SYSTEMS
[Recent Weight Gain (___ Lbs)] : recent [unfilled] ~Ulb weight gain [Feeling Fatigued] : feeling fatigued [Shortness Of Breath] : shortness of breath [Dyspnea on exertion] : dyspnea during exertion [Lower Ext Edema] : lower extremity edema [Palpitations] : palpitations [Change in Appetite] : change in appetite [Incontinence] : incontinence [Easy Bleeding] : a tendency for easy bleeding [Easy Bruising] : a tendency for easy bruising [Negative] : Endocrine [Fever] : no fever [Chest  Pressure] : no chest pressure [Chills] : no chills [Abdominal Pain] : no abdominal pain [Chest Pain] : no chest pain [Leg Claudication] : no intermittent leg claudication [Nausea] : no nausea [Heartburn] : no heartburn [Anxiety] : no anxiety [Confusion] : no confusion was observed

## 2020-08-18 NOTE — CONSULT LETTER
[Courtesy Letter:] : I had the pleasure of seeing your patient, [unfilled], in my office today. [Dear  ___] : Dear  [unfilled], [Consult Closing:] : Thank you very much for allowing me to participate in the care of this patient.  If you have any questions, please do not hesitate to contact me. [Please see my note below.] : Please see my note below. [Sincerely,] : Sincerely, [FreeTextEntry2] : Leatha Guerrero M.D.\[address]\[place], [place], [address]\par  [FreeTextEntry3] : Jaquan Cody MD\par \par Cardiovascular & Thoracic Surgery\par Professor\par [facility]\par \par

## 2020-08-18 NOTE — ASSESSMENT
[FreeTextEntry1] : This is a 89 year old female with past medical history of hypertension, Afib( On Xarelto) , SBO S/P Exp lap , lysis of adhesion, repair of umbilical hernia on 2/16/18 , HFpEF  and Known Aortic stenosis for 2 years with complaints of Dyspnea on exertion 6 months got worse past the 2 months , fatigue , pedal edema and palpitations . She is lethargic today, arousal to verbal and tactile stimuli but falls asleep easily. She is tachypneic, and reportedly had difficulty laying down for TTE today.She has PND and orthopnea that was previously alleviated with two pillows but she now has trouble sleeping at night. Her appetite has decreased and her son reports that he has to "force her to eat". She had multiple hospital admissions due to congestive heart failure, last one in Feb 2020. She can walk up to 25 feet without shortness of breath and uses 2 pillows to sleep . She is fully dependent with ADLs. As per her son, she is incontinent of urine for the past 1 week. Her STS score is 7.47%.She is referred by Dr. Guerrero to valve clinic for evaluation of mitral Regurgitation. Denies any chest pain, syncope . \par \par Echo repeated today shows severe MR, TR, biventricular failure with LVEF about 10% and large left pleural effusion.  The patient is barely arousable and has witnessed Cheyne-Hidalgo respiration.  The son was offered admission to the hospital but refused stating that it was against her wishes.  It is unlikely that she will survive much longer and appears to be at the end of severe progressive heart disease. \par \par \par

## 2020-08-18 NOTE — PHYSICAL EXAM
[General Appearance - Alert] : alert [Sclera] : the sclera and conjunctiva were normal [PERRL With Normal Accommodation] : pupils were equal in size, round, and reactive to light [Decreased Breath Sounds] : breath sounds were decreased diffusely [___ +] : bilateral [unfilled]U+ pitting edema to the ankles [II] : a grade 2 [Examination Of The Chest] : the chest was normal in appearance [2+] : left 2+ [Bowel Sounds] : normal bowel sounds [Abdomen Soft] : soft [No CVA Tenderness] : no ~M costovertebral angle tenderness [Involuntary Movements] : no involuntary movements were seen [Skin Color & Pigmentation] : normal skin color and pigmentation [No Focal Deficits] : no focal deficits [Memory Recent] : recent memory was not impaired [FreeTextEntry1] : Deferred

## 2020-08-18 NOTE — PHYSICAL EXAM
[Acutely Ill] : acutely ill [Looks Tired] : appears tired [Normal Conjunctiva] : the conjunctiva exhibited no abnormalities [Normal Oral Mucosa] : normal oral mucosa [JVD Elevated _____cm] : JVD elevated [unfilled] ~U cm above clavicle [Normal Rate] : normal [Irregularly Irregular] : irregularly irregular [Tachypnea] : tachypnea was noted [Acc Muscles Use] : accessory muscle use was noted [Shallow] : shallow respirations were noted [Decreased Breath Sounds] : breath sounds were decreased diffusely [Bowel Sounds] : normal bowel sounds [Abdomen Tenderness] : non-tender [Abnormal Walk] : normal gait [Nail Clubbing] : no clubbing of the fingernails [Skin Turgor] : normal skin turgor [] : no rash [Well Nourished] : not well nourished [Uncomfortable] : uncomfortable [___ +] : bilateral [unfilled]U+ pretibial pitting edema [I] : a grade 1 [Lt] : no varicose veins of the left leg [Rt] : no varicose veins of the right leg [Air Hunger] : air hunger was noted [Audible Wheezing] : no audible wheezing [Dry Cough] : no dry cough [Nasal Flaring] : no nasal flaring [Decreased Breath Sounds Bilaterally Bases] : breath sounds were diminished over both bases [Memory Recent] : recent memory was not impaired [FreeTextEntry1] : Lethargic, falling asleep

## 2020-08-18 NOTE — HISTORY OF PRESENT ILLNESS
[FreeTextEntry1] : Mrs. Lewis is a 89 year old female, referred by Dr. Guerrero for evaluation of aortic stenosis. She is lethargic today, arousal to verbal and tactile stimuli but falls asleep easily. She is tachypneic, and reportedly had difficulty laying down for TTE today. Her son is with her today, reports admission for CHF in February at [facility] requiring IV diuresis. He notes two months of progressive fatigue, dyspnea with minimal exertion, and dependent edema to BLE and Left arm. She has PND and orthopnea that was previously alleviated with two pillows but she now has trouble sleeping at night. Her appetite has decreased and her son reports that he has to "force her to eat". She has developed urinary incontinence over the past week since her furosemide was increased. \par \par Past medical history includes HTN, CHF, AFib on Eliquis, and small bowel obstruction s/p ex-lap in 2018. \par \par Repeat echocardiogram today demonstrates severely reduced LV function, torrential MR and TR, and a large pleural effusion.

## 2020-08-18 NOTE — DATA REVIEWED
[FreeTextEntry1] : 11/17/18 ECHO revealed EF 55%, Moderate mitral Regurgitation, trileaflet aortic valve.\par \par 8/18/20 TTE results Moderate to severe Mitral Regurgitation, torrential TR

## 2020-08-18 NOTE — REVIEW OF SYSTEMS
[Palpitations] : palpitations [Feeling Tired] : feeling tired [Lower Ext Edema] : lower extremity edema [Shortness Of Breath] : shortness of breath [SOB on Exertion] : shortness of breath during exertion [Negative] : Heme/Lymph [Chest Pain] : no chest pain [Dizziness] : no dizziness [Fainting] : no fainting [Easy Bleeding] : no tendency for easy bleeding [Easy Bruising] : no tendency for easy bruising

## 2020-08-18 NOTE — ASSESSMENT
[FreeTextEntry1] : Ms Lewis presents with her son for evaluation in the setting of CHF and severe MR. She is severely decompensated today with pleural effusions, tachypnea, lethargy, edema, and a markedly diminished appetite, as per her son. She is cachectic and severely frail. Her TTE demonstrates severely reduced LV function, as noted above. She appears critically ill, and Dr Cody and I have encouraged emergent hospitalization and treatment. She and her son unequivocally and adamantly do not wish for her to be hospitalized at this time. I have explained my concerns in detail, including the very limited time frame of her survival in the current state. He states he is aware of this and she has repeatedly told him she did not wish to die in a hospital, but at home. As such, I recommended immediate home hospice evaluation--at which time he informed me that hospice is calling him on 8/20/20 to come and assess her for home hospice--for the sake of her comfort, I encouraged him to pursue such intervention as offered. I have notified Dr Guerrero of our findings and recommendations. Dr Guerrero just doubled her Lasix yesterday from 40mg daily to BID, which I agree with.

## 2021-08-27 NOTE — PATIENT PROFILE ADULT - OVER THE PAST TWO WEEKS HAVE YOU FELT DOWN, DEPRESSED OR HOPELESS?
[facility] Radiation Treatment Management Note 1-5    Patient:  Winnie Oseguera  Age:  48year old  Visit Diagnosis:  Recurrent R breast cancer   Primary Rad/Onc:  Dr. Smith China    Site Delivered Dose (cGy) Prescribed Dose (cGy) Enedina Philip
no

## 2021-09-01 NOTE — PATIENT PROFILE ADULT - LIVES WITH
Picato Counseling:  I discussed with the patient the risks of Picato including but not limited to erythema, scaling, itching, weeping, crusting, and pain. alone

## 2022-01-24 NOTE — PROGRESS NOTE ADULT - SUBJECTIVE AND OBJECTIVE BOX
59M with Berna's gangrene, s/p debridement, followed by Urology. E.coli and Candida albicans grew on culture (urine culture also grew Candida albicans). On Zosyn and fluconazole. Repeat US 1/22 with  4.7 x 2.3 x 2.5 cm residual abscess; draining spontaneously? IR can not aspirate.    Recommendations:  - continue antibiotics; fluconazole and zosyn reasonable while inpatient. On d/c, can de-escalate to PO fluconazole and PO cipro.   - duration of abx until radiographic resolution of abscess. Consider more definitive drainage of abscess if possible; would significantly shorten the duration of antibiotics  - repeat imaging ~2 weeks to reassess size of fluid collection  - if procedure done, please send updated surgical cultures  - please notify ID with any new growth on culture    Discussed with patient side effects of quinolones (including but not limited to qtc prolongation, hypoglycemia, achilles tendon rupture, worsening of aneurysms, c.diff) and these are acceptable risks and preferable to IV medication and risk of picc line complications. Qtc = 452. Avoid milk products within 2 hours     Follow-up appointment will be arranged by the ID clinic and will be found in the patient's appointments tab.     Prior to discharge, please ensure the patient's follow-up has been scheduled.     If there is still no follow-up scheduled prior to discharge, please send an EPIC message to Dulce Augustine in Infectious Diseases.                   INTERVAL HPI/OVERNIGHT EVENTS: No acute overnight events.    PRESSORS: [x ] YES [ ] NO  WHICH: Levophed    ANTIBIOTICS: Zosyn                DATE STARTED:     Antimicrobial:  piperacillin/tazobactam IVPB. 3.375 Gram(s) IV Intermittent every 8 hours    Cardiovascular:  norepinephrine Infusion 0.05 MICROgram(s)/kG/Min IV Continuous <Continuous>    Pulmonary:    Hematalogic:  heparin  Injectable 5000 Unit(s) SubCutaneous every 8 hours    Other:  chlorhexidine 4% Liquid 1 Application(s) Topical two times a day  dexmedetomidine Infusion 0.5 MICROgram(s)/kG/Hr IV Continuous <Continuous>  fentaNYL   Infusion 2 MICROgram(s)/kG/Hr IV Continuous <Continuous>  pantoprazole  Injectable 40 milliGRAM(s) IV Push daily    chlorhexidine 4% Liquid 1 Application(s) Topical two times a day  dexmedetomidine Infusion 0.5 MICROgram(s)/kG/Hr IV Continuous <Continuous>  fentaNYL   Infusion 2 MICROgram(s)/kG/Hr IV Continuous <Continuous>  heparin  Injectable 5000 Unit(s) SubCutaneous every 8 hours  norepinephrine Infusion 0.05 MICROgram(s)/kG/Min IV Continuous <Continuous>  pantoprazole  Injectable 40 milliGRAM(s) IV Push daily  piperacillin/tazobactam IVPB. 3.375 Gram(s) IV Intermittent every 8 hours    Drug Dosing Weight  Height (cm): 167.64 (2018 18:38)  Weight (kg): 58.4 (2018 18:38)  BMI (kg/m2): 20.8 (2018 18:38)  BSA (m2): 1.66 (2018 18:38)    CENTRAL LINE: [ ] YES [ ] NO  LOCATION:         ROSADO: [ ] YES [ ] NO          A-LINE:  [ ] YES [ ] NO  LOCATION:             ICU Vital Signs Last 24 Hrs  T(C): 36.5 (2018 00:00), Max: 36.7 (2018 07:15)  T(F): 97.7 (2018 00:00), Max: 98.1 (2018 07:15)  HR: 60 (2018 23:30) (49 - 96)  BP: 93/43 (2018 23:30) (70/50 - 176/79)  BP(mean): 56 (2018 23:30) (44 - 110)  ABP: --  ABP(mean): --  RR: 17 (2018 23:30) (16 - 27)  SpO2: 100% (2018 23:30) (85% - 100%)      ABG - ( 2018 04:33 )  pH, Arterial: 7.44  pH, Blood: x     /  pCO2: 30    /  pO2: 179   / HCO3: 20    / Base Excess: -2.6  /  SaO2: >99.1                  @ 07:01  -   @ 07:00  --------------------------------------------------------  IN: 1720.6 mL / OUT: 850 mL / NET: 870.6 mL        Mode: AC/ CMV (Assist Control/ Continuous Mandatory Ventilation)  RR (machine): 12  TV (machine): 400  FiO2: 40  PEEP: 5  ITime: 1  MAP: 10  PIP: 21      PHYSICAL EXAM:    GENERAL: Sedated  HEAD:  Atraumatic, Normocephalic  EYES: PERRLA, conjunctiva and sclera clear  ENMT: No tonsillar erythema, exudates, or enlargement;  NECK: Supple, normal appearance, No JVD;   NERVOUS SYSTEM:  Sedated but off sedation moves all 4 ext, not following commands.   CHEST/LUNG: No chest deformity; Normal percussion bilaterally  HEART: Regular rate and rhythm; No murmurs, rubs, or gallops  ABDOMEN: Mild distension with generalized tenderness  EXTREMITIES: 2+ Peripheral Pulses,No clubbing, cyanosis, or edema Bilat lower extremity edema  LYMPH: No lymphadenopathy noted  SKIN: No rashes or lesions; [ ]Good capillary refill      LABS:  CBC Full  -  ( 2018 06:05 )  WBC Count : 7.1 K/uL  Hemoglobin : 12.6 g/dL  Hematocrit : 38.5 %  Platelet Count - Automated : 191 K/uL  Mean Cell Volume : 95.2 fl  Mean Cell Hemoglobin : 31.1 pg  Mean Cell Hemoglobin Concentration : 32.7 gm/dL  Auto Neutrophil # : 5.8 K/uL  Auto Lymphocyte # : 0.5 K/uL  Auto Monocyte # : 0.7 K/uL  Auto Eosinophil # : 0.0 K/uL  Auto Basophil # : 0.1 K/uL  Auto Neutrophil % : 81.7 %  Auto Lymphocyte % : 6.7 %  Auto Monocyte % : 10.4 %  Auto Eosinophil % : 0.4 %  Auto Basophil % : 0.8 %        142  |  108  |  20<H>  ----------------------------<  122<H>  3.4<L>   |  22  |  0.94    Ca    7.6<L>      2018 06:05  Phos  2.8       Mg     2.1         TPro  8.1  /  Alb  3.5  /  TBili  1.3<H>  /  DBili  x   /  AST  55<H>  /  ALT  29  /  AlkPhos  136<H>      PTT - ( 2018 02:56 )  PTT:26.4 sec  Urinalysis Basic - ( 2018 05:10 )    Color: Yellow / Appearance: Clear / S.010 / pH: x  Gluc: x / Ketone: Trace  / Bili: Negative / Urobili: Negative   Blood: x / Protein: 100 / Nitrite: Negative   Leuk Esterase: Small / RBC: 2-5 /HPF / WBC 3-5 /HPF   Sq Epi: x / Non Sq Epi: Few /HPF / Bacteria: Trace /HPF      Culture Results:   No growth ( @ 09:44)      RADIOLOGY & ADDITIONAL STUDIES REVIEWED:  ***    [ ]GOALS OF CARE DISCUSSION WITH PATIENT/FAMILY/PROXY:    CRITICAL CARE TIME SPENT: 35 minutes INTERVAL HPI/OVERNIGHT EVENTS: No acute overnight events.    PRESSORS: [x ] YES [ ] NO  WHICH: Levophed    ANTIBIOTICS: Zosyn                DATE STARTED:     Antimicrobial:  piperacillin/tazobactam IVPB. 3.375 Gram(s) IV Intermittent every 8 hours    Cardiovascular:  norepinephrine Infusion 0.05 MICROgram(s)/kG/Min IV Continuous <Continuous>    Pulmonary:    Hematalogic:  heparin  Injectable 5000 Unit(s) SubCutaneous every 8 hours    Other:  chlorhexidine 4% Liquid 1 Application(s) Topical two times a day  dexmedetomidine Infusion 0.5 MICROgram(s)/kG/Hr IV Continuous <Continuous>  fentaNYL   Infusion 2 MICROgram(s)/kG/Hr IV Continuous <Continuous>  pantoprazole  Injectable 40 milliGRAM(s) IV Push daily    chlorhexidine 4% Liquid 1 Application(s) Topical two times a day  dexmedetomidine Infusion 0.5 MICROgram(s)/kG/Hr IV Continuous <Continuous>  fentaNYL   Infusion 2 MICROgram(s)/kG/Hr IV Continuous <Continuous>  heparin  Injectable 5000 Unit(s) SubCutaneous every 8 hours  norepinephrine Infusion 0.05 MICROgram(s)/kG/Min IV Continuous <Continuous>  pantoprazole  Injectable 40 milliGRAM(s) IV Push daily  piperacillin/tazobactam IVPB. 3.375 Gram(s) IV Intermittent every 8 hours    Drug Dosing Weight  Height (cm): 167.64 (2018 18:38)  Weight (kg): 58.4 (2018 18:38)  BMI (kg/m2): 20.8 (2018 18:38)  BSA (m2): 1.66 (2018 18:38)    CENTRAL LINE: [x ] YES [ ] NO  LOCATION: [facility]          ROSADO: [x ] YES [ ] NO          A-LINE:  [ ] YES [ ] NO  LOCATION:             ICU Vital Signs Last 24 Hrs  T(C): 36.5 (2018 00:00), Max: 36.7 (2018 07:15)  T(F): 97.7 (2018 00:00), Max: 98.1 (2018 07:15)  HR: 60 (2018 23:30) (49 - 96)  BP: 93/43 (2018 23:30) (70/50 - 176/79)  BP(mean): 56 (2018 23:30) (44 - 110)  ABP: --  ABP(mean): --  RR: 17 (2018 23:30) (16 - 27)  SpO2: 100% (2018 23:30) (85% - 100%)      ABG - ( 2018 04:33 )  pH, Arterial: 7.44  pH, Blood: x     /  pCO2: 30    /  pO2: 179   / HCO3: 20    / Base Excess: -2.6  /  SaO2: >99.1                 -16 @ 07:01  -   @ 07:00  --------------------------------------------------------  IN: 1720.6 mL / OUT: 850 mL / NET: 870.6 mL        Mode: AC/ CMV (Assist Control/ Continuous Mandatory Ventilation)  RR (machine): 12  TV (machine): 400  FiO2: 40  PEEP: 5  ITime: 1  MAP: 10  PIP: 21      PHYSICAL EXAM:    GENERAL: Sedated  HEAD:  Atraumatic, Normocephalic  EYES: PERRLA, conjunctiva and sclera clear  ENMT: No tonsillar erythema, exudates, or enlargement;  NECK: Supple, normal appearance, No JVD;   NERVOUS SYSTEM:  Sedated but off sedation moves all 4 ext, not following commands.   CHEST/LUNG: No chest deformity; Normal percussion bilaterally  HEART: Regular rate and rhythm; No murmurs, rubs, or gallops  ABDOMEN: Mild distension with generalized tenderness  EXTREMITIES: 2+ Peripheral Pulses,No clubbing, cyanosis, or edema Bilat lower extremity edema  LYMPH: No lymphadenopathy noted  SKIN: No rashes or lesions; [ ]Good capillary refill      LABS:  CBC Full  -  ( 2018 06:05 )  WBC Count : 7.1 K/uL  Hemoglobin : 12.6 g/dL  Hematocrit : 38.5 %  Platelet Count - Automated : 191 K/uL  Mean Cell Volume : 95.2 fl  Mean Cell Hemoglobin : 31.1 pg  Mean Cell Hemoglobin Concentration : 32.7 gm/dL  Auto Neutrophil # : 5.8 K/uL  Auto Lymphocyte # : 0.5 K/uL  Auto Monocyte # : 0.7 K/uL  Auto Eosinophil # : 0.0 K/uL  Auto Basophil # : 0.1 K/uL  Auto Neutrophil % : 81.7 %  Auto Lymphocyte % : 6.7 %  Auto Monocyte % : 10.4 %  Auto Eosinophil % : 0.4 %  Auto Basophil % : 0.8 %        142  |  108  |  20<H>  ----------------------------<  122<H>  3.4<L>   |  22  |  0.94    Ca    7.6<L>      2018 06:05  Phos  2.8       Mg     2.1         TPro  8.1  /  Alb  3.5  /  TBili  1.3<H>  /  DBili  x   /  AST  55<H>  /  ALT  29  /  AlkPhos  136<H>      PTT - ( 2018 02:56 )  PTT:26.4 sec  Urinalysis Basic - ( 2018 05:10 )    Color: Yellow / Appearance: Clear / S.010 / pH: x  Gluc: x / Ketone: Trace  / Bili: Negative / Urobili: Negative   Blood: x / Protein: 100 / Nitrite: Negative   Leuk Esterase: Small / RBC: 2-5 /HPF / WBC 3-5 /HPF   Sq Epi: x / Non Sq Epi: Few /HPF / Bacteria: Trace /HPF      Culture Results:   No growth ( @ 09:44)      RADIOLOGY & ADDITIONAL STUDIES REVIEWED:  ***    [ ]GOALS OF CARE DISCUSSION WITH PATIENT/FAMILY/PROXY:    CRITICAL CARE TIME SPENT: 35 minutes INTERVAL HPI/OVERNIGHT EVENTS: No acute overnight events.    PRESSORS: [x ] YES [ ] NO  WHICH: Levophed    ANTIBIOTICS: Zosyn                DATE STARTED:     Antimicrobial:  piperacillin/tazobactam IVPB. 3.375 Gram(s) IV Intermittent every 8 hours    Cardiovascular:  norepinephrine Infusion 0.05 MICROgram(s)/kG/Min IV Continuous <Continuous>    Pulmonary:    Hematalogic:  heparin  Injectable 5000 Unit(s) SubCutaneous every 8 hours    Other:  chlorhexidine 4% Liquid 1 Application(s) Topical two times a day  dexmedetomidine Infusion 0.5 MICROgram(s)/kG/Hr IV Continuous <Continuous>  fentaNYL   Infusion 2 MICROgram(s)/kG/Hr IV Continuous <Continuous>  pantoprazole  Injectable 40 milliGRAM(s) IV Push daily    chlorhexidine 4% Liquid 1 Application(s) Topical two times a day  dexmedetomidine Infusion 0.5 MICROgram(s)/kG/Hr IV Continuous <Continuous>  fentaNYL   Infusion 2 MICROgram(s)/kG/Hr IV Continuous <Continuous>  heparin  Injectable 5000 Unit(s) SubCutaneous every 8 hours  norepinephrine Infusion 0.05 MICROgram(s)/kG/Min IV Continuous <Continuous>  pantoprazole  Injectable 40 milliGRAM(s) IV Push daily  piperacillin/tazobactam IVPB. 3.375 Gram(s) IV Intermittent every 8 hours    Drug Dosing Weight  Height (cm): 167.64 (2018 18:38)  Weight (kg): 58.4 (2018 18:38)  BMI (kg/m2): 20.8 (2018 18:38)  BSA (m2): 1.66 (2018 18:38)    CENTRAL LINE: [x ] YES [ ] NO  LOCATION: [facility]         ROSADO: [x ] YES [ ] NO          A-LINE:  [ ] YES [ ] NO  LOCATION:             ICU Vital Signs Last 24 Hrs  T(C): 36.5 (2018 00:00), Max: 36.7 (2018 07:15)  T(F): 97.7 (2018 00:00), Max: 98.1 (2018 07:15)  HR: 60 (2018 23:30) (49 - 96)  BP: 93/43 (2018 23:30) (70/50 - 176/79)  BP(mean): 56 (2018 23:30) (44 - 110)  ABP: --  ABP(mean): --  RR: 17 (2018 23:30) (16 - 27)  SpO2: 100% (2018 23:30) (85% - 100%)      ABG - ( 2018 04:33 )  pH, Arterial: 7.44  pH, Blood: x     /  pCO2: 30    /  pO2: 179   / HCO3: 20    / Base Excess: -2.6  /  SaO2: >99.1                 -16 @ 07:01  -   @ 07:00  --------------------------------------------------------  IN: 1720.6 mL / OUT: 850 mL / NET: 870.6 mL        Mode: AC/ CMV (Assist Control/ Continuous Mandatory Ventilation)  RR (machine): 12  TV (machine): 400  FiO2: 40  PEEP: 5  ITime: 1  MAP: 10  PIP: 21      PHYSICAL EXAM:    GENERAL: Sedated  HEAD:  Atraumatic, Normocephalic  EYES: PERRLA, conjunctiva and sclera clear  ENMT: No tonsillar erythema, exudates, or enlargement;  NECK: Supple, normal appearance, No JVD;   NERVOUS SYSTEM:  Sedated but off sedation moves all 4 ext, not following commands.   CHEST/LUNG: No chest deformity; Normal percussion bilaterally  HEART: Regular rate and rhythm; No murmurs, rubs, or gallops  ABDOMEN: Mild distension with generalized tenderness  EXTREMITIES: 2+ Peripheral Pulses,No clubbing, cyanosis, or edema Bilat lower extremity edema  LYMPH: No lymphadenopathy noted  SKIN: No rashes or lesions; [ ]Good capillary refill      LABS:  CBC Full  -  ( 2018 06:05 )  WBC Count : 7.1 K/uL  Hemoglobin : 12.6 g/dL  Hematocrit : 38.5 %  Platelet Count - Automated : 191 K/uL  Mean Cell Volume : 95.2 fl  Mean Cell Hemoglobin : 31.1 pg  Mean Cell Hemoglobin Concentration : 32.7 gm/dL  Auto Neutrophil # : 5.8 K/uL  Auto Lymphocyte # : 0.5 K/uL  Auto Monocyte # : 0.7 K/uL  Auto Eosinophil # : 0.0 K/uL  Auto Basophil # : 0.1 K/uL  Auto Neutrophil % : 81.7 %  Auto Lymphocyte % : 6.7 %  Auto Monocyte % : 10.4 %  Auto Eosinophil % : 0.4 %  Auto Basophil % : 0.8 %        142  |  108  |  20<H>  ----------------------------<  122<H>  3.4<L>   |  22  |  0.94    Ca    7.6<L>      2018 06:05  Phos  2.8       Mg     2.1         TPro  8.1  /  Alb  3.5  /  TBili  1.3<H>  /  DBili  x   /  AST  55<H>  /  ALT  29  /  AlkPhos  136<H>      PTT - ( 2018 02:56 )  PTT:26.4 sec  Urinalysis Basic - ( 2018 05:10 )    Color: Yellow / Appearance: Clear / S.010 / pH: x  Gluc: x / Ketone: Trace  / Bili: Negative / Urobili: Negative   Blood: x / Protein: 100 / Nitrite: Negative   Leuk Esterase: Small / RBC: 2-5 /HPF / WBC 3-5 /HPF   Sq Epi: x / Non Sq Epi: Few /HPF / Bacteria: Trace /HPF      Culture Results:   No growth ( @ 09:44)      RADIOLOGY & ADDITIONAL STUDIES REVIEWED:  ***    [ ]GOALS OF CARE DISCUSSION WITH PATIENT/FAMILY/PROXY:    CRITICAL CARE TIME SPENT: 35 minutes INTERVAL HPI/OVERNIGHT EVENTS: No acute overnight events, CXR congested - given Lasix 40 and replaced Potassium; pending Surgery recommendations on AC for new Afib along w/ TTE showing LAE and G3DD    PRESSORS: Levophed    Antimicrobial:  piperacillin/tazobactam IVPB. 3.375 Gram(s) IV Intermittent every 8 hours     Cardiovascular:  norepinephrine Infusion 0.05 MICROgram(s)/kG/Min IV Continuous <Continuous>    Pulmonary:    Hematalogic:  heparin  Injectable 5000 Unit(s) SubCutaneous every 8 hours    Other:  chlorhexidine 4% Liquid 1 Application(s) Topical two times a day  dexmedetomidine Infusion 0.5 MICROgram(s)/kG/Hr IV Continuous <Continuous>  fentaNYL   Infusion 2 MICROgram(s)/kG/Hr IV Continuous <Continuous>  pantoprazole  Injectable 40 milliGRAM(s) IV Push daily    chlorhexidine 4% Liquid 1 Application(s) Topical two times a day  dexmedetomidine Infusion 0.5 MICROgram(s)/kG/Hr IV Continuous <Continuous>  fentaNYL   Infusion 2 MICROgram(s)/kG/Hr IV Continuous <Continuous>  heparin  Injectable 5000 Unit(s) SubCutaneous every 8 hours  norepinephrine Infusion 0.05 MICROgram(s)/kG/Min IV Continuous <Continuous>  pantoprazole  Injectable 40 milliGRAM(s) IV Push daily  piperacillin/tazobactam IVPB. 3.375 Gram(s) IV Intermittent every 8 hours    Drug Dosing Weight  Height (cm): 167.64 (2018 18:38)  Weight (kg): 58.4 (2018 18:38)  BMI (kg/m2): 20.8 (2018 18:38)  BSA (m2): 1.66 (2018 18:38)    CENTRAL LINE: [facility]  2/2 Pressor    ROSADO: Yes 2/2 Strict IOs      A-LINE: NO    ICU Vital Signs Last 24 Hrs  T(C): 36.5 (2018 00:00), Max: 36.7 (2018 07:15)  T(F): 97.7 (2018 00:00), Max: 98.1 (2018 07:15)  HR: 60 (2018 23:30) (49 - 96)  BP: 93/43 (2018 23:30) (70/50 - 176/79)  BP(mean): 56 (2018 23:30) (44 - 110)  ABP: --  ABP(mean): --  RR: 17 (2018 23:30) (16 - 27)  SpO2: 100% (2018 23:30) (85% - 100%)      ABG - ( 2018 04:33 )  pH, Arterial: 7.44  pH, Blood: x     /  pCO2: 30    /  pO2: 179   / HCO3: 20    / Base Excess: -2.6  /  SaO2: >99.1                  @ 07:01  -  - @ 07:00  --------------------------------------------------------  IN: 1720.6 mL / OUT: 850 mL / NET: 870.6 mL        Mode: AC/ CMV (Assist Control/ Continuous Mandatory Ventilation)  RR (machine): 12  TV (machine): 400  FiO2: 40  PEEP: 5  ITime: 1  MAP: 10  PIP: 21      PHYSICAL EXAM:    GENERAL: Sedated  HEAD:  Atraumatic, Normocephalic  EYES: PERRLA, conjunctiva and sclera clear  ENMT: No tonsillar erythema, exudates, or enlargement; NGT in place  NECK: Supple, normal appearance, No JVD;   NERVOUS SYSTEM:  Sedated but off sedation moves all 4 ext, not following commands.   CHEST/LUNG: No chest deformity; Normal percussion bilaterally  HEART: Regular rate and rhythm; No murmurs, rubs, or gallops  ABDOMEN: Mild distension with generalized tenderness  EXTREMITIES: 2+ Peripheral Pulses, No clubbing, cyanosis, or edema Bilat lower extremity edema  LYMPH: No lymphadenopathy noted  SKIN: No rashes or lesions      LABS:  CBC Full  -  ( 2018 06:05 )  WBC Count : 7.1 K/uL  Hemoglobin : 12.6 g/dL  Hematocrit : 38.5 %  Platelet Count - Automated : 191 K/uL  Mean Cell Volume : 95.2 fl  Mean Cell Hemoglobin : 31.1 pg  Mean Cell Hemoglobin Concentration : 32.7 gm/dL  Auto Neutrophil # : 5.8 K/uL  Auto Lymphocyte # : 0.5 K/uL  Auto Monocyte # : 0.7 K/uL  Auto Eosinophil # : 0.0 K/uL  Auto Basophil # : 0.1 K/uL  Auto Neutrophil % : 81.7 %  Auto Lymphocyte % : 6.7 %  Auto Monocyte % : 10.4 %  Auto Eosinophil % : 0.4 %  Auto Basophil % : 0.8 %        142  |  108  |  20<H>  ----------------------------<  122<H>  3.4<L>   |  22  |  0.94    Ca    7.6<L>      2018 06:05  Phos  2.8       Mg     2.1         TPro  8.1  /  Alb  3.5  /  TBili  1.3<H>  /  DBili  x   /  AST  55<H>  /  ALT  29  /  AlkPhos  136<H>      PTT - ( 2018 02:56 )  PTT:26.4 sec  Urinalysis Basic - ( 2018 05:10 )    Color: Yellow / Appearance: Clear / S.010 / pH: x  Gluc: x / Ketone: Trace  / Bili: Negative / Urobili: Negative   Blood: x / Protein: 100 / Nitrite: Negative   Leuk Esterase: Small / RBC: 2-5 /HPF / WBC 3-5 /HPF   Sq Epi: x / Non Sq Epi: Few /HPF / Bacteria: Trace /HPF      Culture Results:   No growth ( @ 09:44)      RADIOLOGY & ADDITIONAL STUDIES REVIEWED:  CXR congested    GOALS OF CARE DISCUSSION WITH PATIENT/FAMILY/PROXY: Full code    CRITICAL CARE TIME SPENT: 35 minutes

## 2022-02-03 NOTE — PROGRESS NOTE ADULT - PROBLEM SELECTOR PLAN 2
no C/w NPO, ASA, and Heparin  when okay from a surgical stand point.   - Hold beta blockers 2/2 shock

## 2022-08-08 NOTE — BRIEF OPERATIVE NOTE - DISPOSITION
Procedure:  EGD    Relevant Problems   GI/HEPATIC   (+) Gastroesophageal reflux disease      NEURO/PSYCH   (+) Migraine aura without headache      Other   (+) Migraine aura without headache        Physical Exam    Airway    Mallampati score: II  TM Distance: >3 FB  Neck ROM: full     Dental       Cardiovascular  Cardiovascular exam normal    Pulmonary  Pulmonary exam normal     Other Findings        Anesthesia Plan  ASA Score- 2     Anesthesia Type- general with ASA Monitors  Additional Monitors:   Airway Plan: LMA  Plan Factors-Exercise tolerance (METS): >4 METS  Chart reviewed  EKG reviewed  Imaging results reviewed  Existing labs reviewed  Patient summary reviewed  Patient is not a current smoker  Patient did not smoke on day of surgery  Obstructive sleep apnea risk education given perioperatively  Induction- intravenous  Postoperative Plan- Plan for postoperative opioid use  Planned trial extubation    Informed Consent- Anesthetic plan and risks discussed with patient and father  I personally reviewed this patient with the CRNA  Discussed and agreed on the Anesthesia Plan with the CRNA  Julianna Block
ICU

## 2023-07-22 NOTE — ED PROVIDER NOTE - MEDICAL DECISION MAKING DETAILS
Previously Declined (within the last year) Will check labs, CT abdomen, EKG, give pain meds then reassess. Will check labs, CT abdomen, EKG, give pain meds then reassess.  labs are significant for elevated troponin and BNP. lactate 2.1. ct abdomen with SBO- possibly closed loop. NG tube placed. surgery consulted. will admit to surgery for further management.

## 2025-02-14 NOTE — PATIENT PROFILE ADULT - NSPROMEDSHERBAL_GEN_A_NUR
Pt called in regard to letter she received regarding traMADol-acetaminophen (ULTRACET) 37.5-325 mg per tablet. Letter in pt chart under Media tab dated 2/4/25. Pt needs refill.  Pt stated she only has enough to last her through the weekend. Please advise and call pt, #[phone]      no

## 2025-05-03 NOTE — ED ADULT NURSE NOTE - EXTENSIONS OF SELF_ADULT
Medications discontinued due to physician leaving organization, added to medication list under outside provider.      Betamethasone dip aug cream  
None